# Patient Record
Sex: MALE | Race: WHITE | NOT HISPANIC OR LATINO | Employment: OTHER | URBAN - METROPOLITAN AREA
[De-identification: names, ages, dates, MRNs, and addresses within clinical notes are randomized per-mention and may not be internally consistent; named-entity substitution may affect disease eponyms.]

---

## 2023-04-17 ENCOUNTER — LAB (OUTPATIENT)
Dept: LAB | Facility: LAB | Age: 63
End: 2023-04-17
Payer: COMMERCIAL

## 2023-04-17 ENCOUNTER — OFFICE VISIT (OUTPATIENT)
Dept: PRIMARY CARE | Facility: CLINIC | Age: 63
End: 2023-04-17
Payer: COMMERCIAL

## 2023-04-17 VITALS
SYSTOLIC BLOOD PRESSURE: 147 MMHG | OXYGEN SATURATION: 96 % | HEIGHT: 71 IN | HEART RATE: 88 BPM | WEIGHT: 315 LBS | BODY MASS INDEX: 44.1 KG/M2 | TEMPERATURE: 97.6 F | DIASTOLIC BLOOD PRESSURE: 72 MMHG

## 2023-04-17 DIAGNOSIS — E11.69 HYPERLIPIDEMIA ASSOCIATED WITH TYPE 2 DIABETES MELLITUS (MULTI): ICD-10-CM

## 2023-04-17 DIAGNOSIS — E78.5 HYPERLIPIDEMIA ASSOCIATED WITH TYPE 2 DIABETES MELLITUS (MULTI): ICD-10-CM

## 2023-04-17 DIAGNOSIS — E66.01 OBESITY, CLASS III, BMI 40-49.9 (MORBID OBESITY) (MULTI): ICD-10-CM

## 2023-04-17 DIAGNOSIS — Z23 NEED FOR PNEUMOCOCCAL VACCINATION: ICD-10-CM

## 2023-04-17 DIAGNOSIS — J43.2 CENTRILOBULAR EMPHYSEMA (MULTI): ICD-10-CM

## 2023-04-17 DIAGNOSIS — I10 HYPERTENSION, UNSPECIFIED TYPE: ICD-10-CM

## 2023-04-17 DIAGNOSIS — F11.21 OPIOID DEPENDENCE IN REMISSION (MULTI): ICD-10-CM

## 2023-04-17 DIAGNOSIS — Z00.01 ANNUAL VISIT FOR GENERAL ADULT MEDICAL EXAMINATION WITH ABNORMAL FINDINGS: Primary | ICD-10-CM

## 2023-04-17 DIAGNOSIS — F31.31 BIPOLAR AFFECTIVE DISORDER, CURRENTLY DEPRESSED, MILD (MULTI): ICD-10-CM

## 2023-04-17 DIAGNOSIS — M35.05 SJOGREN'S SYNDROME WITH INFLAMMATORY ARTHRITIS (MULTI): ICD-10-CM

## 2023-04-17 PROBLEM — M35.00 SJOGREN'S DISEASE (MULTI): Status: ACTIVE | Noted: 2023-04-17

## 2023-04-17 PROBLEM — E03.9 ACQUIRED HYPOTHYROIDISM: Status: ACTIVE | Noted: 2020-05-28

## 2023-04-17 PROBLEM — N40.0 BPH (BENIGN PROSTATIC HYPERPLASIA): Status: ACTIVE | Noted: 2023-04-17

## 2023-04-17 PROBLEM — G47.33 OBSTRUCTIVE SLEEP APNEA SYNDROME: Status: ACTIVE | Noted: 2020-05-28

## 2023-04-17 PROBLEM — R91.8 PULMONARY NODULES: Status: ACTIVE | Noted: 2023-04-17

## 2023-04-17 PROBLEM — K21.9 GERD (GASTROESOPHAGEAL REFLUX DISEASE): Status: ACTIVE | Noted: 2023-04-17

## 2023-04-17 PROBLEM — J44.9 COPD (CHRONIC OBSTRUCTIVE PULMONARY DISEASE) (MULTI): Status: ACTIVE | Noted: 2023-04-17

## 2023-04-17 LAB
ALBUMIN (MG/L) IN URINE: <7 MG/L
ALBUMIN/CREATININE (UG/MG) IN URINE: NORMAL UG/MG CRT (ref 0–30)
CHOLESTEROL (MG/DL) IN SER/PLAS: 151 MG/DL (ref 0–199)
CHOLESTEROL IN HDL (MG/DL) IN SER/PLAS: 41.2 MG/DL
CHOLESTEROL/HDL RATIO: 3.7
CREATININE (MG/DL) IN URINE: 124 MG/DL (ref 20–370)
ESTIMATED AVERAGE GLUCOSE FOR HBA1C: 120 MG/DL
HEMOGLOBIN A1C/HEMOGLOBIN TOTAL IN BLOOD: 5.8 %
LDL: 83 MG/DL (ref 0–99)
TRIGLYCERIDE (MG/DL) IN SER/PLAS: 135 MG/DL (ref 0–149)
VLDL: 27 MG/DL (ref 0–40)

## 2023-04-17 PROCEDURE — G0439 PPPS, SUBSEQ VISIT: HCPCS | Performed by: INTERNAL MEDICINE

## 2023-04-17 PROCEDURE — 82570 ASSAY OF URINE CREATININE: CPT

## 2023-04-17 PROCEDURE — G0009 ADMIN PNEUMOCOCCAL VACCINE: HCPCS | Performed by: INTERNAL MEDICINE

## 2023-04-17 PROCEDURE — 36415 COLL VENOUS BLD VENIPUNCTURE: CPT

## 2023-04-17 PROCEDURE — 82043 UR ALBUMIN QUANTITATIVE: CPT

## 2023-04-17 PROCEDURE — 80061 LIPID PANEL: CPT

## 2023-04-17 PROCEDURE — 90677 PCV20 VACCINE IM: CPT | Performed by: INTERNAL MEDICINE

## 2023-04-17 PROCEDURE — 1036F TOBACCO NON-USER: CPT | Performed by: INTERNAL MEDICINE

## 2023-04-17 PROCEDURE — 3077F SYST BP >= 140 MM HG: CPT | Performed by: INTERNAL MEDICINE

## 2023-04-17 PROCEDURE — 3044F HG A1C LEVEL LT 7.0%: CPT | Performed by: INTERNAL MEDICINE

## 2023-04-17 PROCEDURE — 83036 HEMOGLOBIN GLYCOSYLATED A1C: CPT

## 2023-04-17 PROCEDURE — 3078F DIAST BP <80 MM HG: CPT | Performed by: INTERNAL MEDICINE

## 2023-04-17 PROCEDURE — 4010F ACE/ARB THERAPY RXD/TAKEN: CPT | Performed by: INTERNAL MEDICINE

## 2023-04-17 PROCEDURE — 99213 OFFICE O/P EST LOW 20 MIN: CPT | Performed by: INTERNAL MEDICINE

## 2023-04-17 RX ORDER — MYCOPHENOLATE MOFETIL 250 MG/1
250 CAPSULE ORAL 2 TIMES DAILY
COMMUNITY
End: 2023-04-17

## 2023-04-17 RX ORDER — FLUOCINONIDE 0.5 MG/G
CREAM TOPICAL
COMMUNITY
Start: 2022-06-13 | End: 2024-04-01 | Stop reason: ALTCHOICE

## 2023-04-17 RX ORDER — PILOCARPINE HYDROCHLORIDE 5 MG/1
TABLET, FILM COATED ORAL
COMMUNITY
Start: 2018-02-09 | End: 2023-11-21 | Stop reason: SDUPTHER

## 2023-04-17 RX ORDER — CLOTRIMAZOLE AND BETAMETHASONE DIPROPIONATE 10; .5 MG/ML; MG/ML
1 LOTION TOPICAL EVERY 12 HOURS
COMMUNITY
End: 2023-04-17 | Stop reason: ALTCHOICE

## 2023-04-17 RX ORDER — OXYBUTYNIN CHLORIDE 15 MG/1
TABLET, EXTENDED RELEASE ORAL
COMMUNITY
Start: 2015-01-14 | End: 2023-11-10 | Stop reason: ALTCHOICE

## 2023-04-17 RX ORDER — ESOMEPRAZOLE MAGNESIUM 40 MG/1
40 CAPSULE, DELAYED RELEASE ORAL DAILY
COMMUNITY
End: 2023-04-17 | Stop reason: ALTCHOICE

## 2023-04-17 RX ORDER — PREGABALIN 100 MG/1
CAPSULE ORAL
COMMUNITY
Start: 2011-12-28 | End: 2023-11-21 | Stop reason: SDUPTHER

## 2023-04-17 RX ORDER — OLMESARTAN MEDOXOMIL 40 MG/1
40 TABLET ORAL DAILY
Qty: 90 TABLET | Refills: 1 | Status: SHIPPED | OUTPATIENT
Start: 2023-04-17 | End: 2023-08-07

## 2023-04-17 RX ORDER — LAMOTRIGINE 200 MG/1
TABLET ORAL
COMMUNITY
Start: 2023-02-09

## 2023-04-17 RX ORDER — DULOXETINE HYDROCHLORIDE 60 MG/1
60 CAPSULE, DELAYED RELEASE ORAL 2 TIMES DAILY
COMMUNITY
Start: 2022-02-02 | End: 2024-04-01 | Stop reason: ALTCHOICE

## 2023-04-17 RX ORDER — LEVOTHYROXINE SODIUM 150 UG/1
TABLET ORAL
COMMUNITY
Start: 2014-11-24 | End: 2023-11-10 | Stop reason: ALTCHOICE

## 2023-04-17 RX ORDER — FOLIC ACID 1 MG/1
TABLET ORAL
COMMUNITY
Start: 2017-01-13 | End: 2023-11-21 | Stop reason: ENTERED-IN-ERROR

## 2023-04-17 RX ORDER — TAMSULOSIN HYDROCHLORIDE 0.4 MG/1
CAPSULE ORAL
COMMUNITY
Start: 2015-01-14

## 2023-04-17 ASSESSMENT — PATIENT HEALTH QUESTIONNAIRE - PHQ9
1. LITTLE INTEREST OR PLEASURE IN DOING THINGS: NOT AT ALL
2. FEELING DOWN, DEPRESSED OR HOPELESS: NOT AT ALL
SUM OF ALL RESPONSES TO PHQ9 QUESTIONS 1 AND 2: 0

## 2023-04-17 NOTE — PROGRESS NOTES
Patient ID: Ronald Beckham is a 63 y.o. male who presents for Annual Exam.    Assessment/Plan     Problem List Items Addressed This Visit          Respiratory    COPD (chronic obstructive pulmonary disease) (CMS/[facility])     Pt has moderate to severe COPD. It is progressive disease, use of MDI and proper technique discussed, rinse mouth after inhaled corticosteroids. Notify if progressive dyspnea or cough or lethargy, monitor oxygen saturation if possible with home based pulse oximetry. Avoid hospitalization and call us if any flare ups are about to happen, be sure that annual flu vaccine are uptodate and review need for pneumococcal vaccine. Light to moderate low impact exercises are very helpful and deep breathing  exercises are beneficial in chronic lung diseases.             Endocrine/Metabolic    Hyperlipidemia associated with type 2 diabetes mellitus (CMS/[facility]) - Primary     Diabetes is chronic disease, it does not get cured but it can be controlled, in modern medicine there are variety of measures taken to control DM. Usually we want to preserve beta cell functions. Please understand the disease and how our life style can affect control of glycemia. Diabetes leads to macro and microvascular complications and they could be devastating. It is important to have annual eye check and frequent foot inspection and foot inspection. Kidney dysfunction including dialysis, foot amputations, neuropathy, foot ulcers and accelerated atherosclerotic vascular disease are known complications of uncontrolled DM, pt was educated and explained.           Relevant Orders    Hemoglobin A1C    Lipid Panel    Albumin , Urine Random    Obesity, Class III, BMI 40-49.9 (morbid obesity) (CMS/[facility])     I spent <15 minutes face to face with individual providing recommendations for nutrition choices and exercise plan to help achieve weight reduction goals. Obesity is systemic disorder and it can bring devastating morbidities in furture. It is  a matter of calorie gain and loss, keeping bodybank in negative calorie balance mode is the way to sustain weight loss.Diet has a big role in reducing excess body wt. Scheduled and well planned meals and food intake with watchfulness and understanding of calorie portion and distribution is key to understand. Bariatric surgery is another option if sustained wt loss is not achieved and faced with one or more comorbidities with morbid obesity. Weigh yourself twice a week to understand and follow wt loss goals.            Other Visit Diagnoses       Need for pneumococcal vaccination        Relevant Orders    Pneumococcal conjugate vaccine, 20-valent, adult (PREVNAR 20) (Completed)    Hypertension, unspecified type              63-year-old male advised skin cancer prostate cancer colon cancer screening flu pneumonia COVID-19 vaccines review laboratory medication advised follow-up with the dental ophthalmology pain management pulmonary cardiology GI and psych service    Follow-up in 3 months discussed about anxiety depression dementia DNR status advance care planning and cardiovascular risk      I spent 15 minutes obtaining and discussing depression screening using pHq-2 questions with patient documented in the chart treatment plan discussI spent 15 minutes face-to-face    I spent 15 minutes face-to-face. This patient discussing cardiovascular risk and behavior therapy of nutrition choices, exercise, and elevation of the habits contributing to risk. We agree on plan how they may be able to reduce the current cardiovascular risk for patient with risk calculation more than 10% aspirin use was discussed and encouraged unless no one allergy or increased discomfort bleeding or contraindication use of aspirin  Source of history: Nurse, Medical personnel, Medical record, Patient.  History limitation: None.      HPI    Allergies   Allergen Reactions    Cat's Claw Shortness of breath and Wheezing     VERIFIED BY ABRAM SEGURA RN     Gabapentin Rash, Shortness of breath and Unknown    Methotrexate Analogues Shortness of breath and Unknown    Penicillins Unknown, Shortness of breath and Other     Unknown reaction    VERIFIED BY ABRAM SEGURA RN    Aripiprazole Unknown and Swelling     Can't remember reaction    VERIFIED BY ABRAM SEGURA RN    Bee Venom Protein (Honey Bee) Unknown    Codeine Unknown    Docusate Unknown    Ex-Lax Hives and Unknown     Blister    Lithium Analogues Unknown     Uncontrollable body jerking    VERIFIED BY ABRAM SEGURA RN    Meperidine Hives and Unknown     Was told he was allergic while in hospital    Meperidine (Pf) Other    Neomycin Unknown    Neosporin (Neomycin-Polymyx) Unknown    Phenolphthalein Swelling     VERIFIED BY ABRAM SEGURA RN    Phenylephrine-Acetaminophen Swelling     VERIFIED BY ABRAM SEGURA RN    Pseudoephedrine-Acetaminophen Other    Sennosides Other    Latex, Natural Rubber Hives, Rash and Unknown       Medications    Current Outpatient Medications   Medication Sig Dispense Refill    Cymbalta 60 mg DR capsule 1 capsule (60 mg).      fluocinonide 0.05 % cream APPLY SPARINGLY TO AFFECTED AREA 3 TIMES A DAY      folic acid (Folvite) 1 mg tablet Take by mouth.      lamoTRIgine (LaMICtal) 200 mg tablet TAKE 1 TABLET BY MOUTH AT BEDTIME DAILY      levothyroxine (Synthroid, Levoxyl) 150 mcg tablet Take by mouth.      Lyrica 100 mg capsule Take by mouth.      oxybutynin XL (Ditropan-XL) 15 mg 24 hr tablet Take by mouth.      pilocarpine (Salagen) 5 mg tablet Take by mouth.      tamsulosin (Flomax) 0.4 mg 24 hr capsule Take by mouth.      clotrimazole-betamethasone (Lotrisone) lotion 1 Application  in the morning and 1 Application in the evening.      esomeprazole (NexIUM) 40 mg DR capsule Take 1 capsule (40 mg) by mouth once daily.      IRON, CARBONYL ORAL Take by mouth.      morphine PF 25 mg/mL injection Infuse into a venous catheter.      mycophenolate (Cellcept) 250 mg capsule Take 1 capsule (250 mg) by  "mouth in the morning and 1 capsule (250 mg) before bedtime.       No current facility-administered medications for this visit.       Objective   Visit Vitals  /72 (BP Location: Left arm, Patient Position: Sitting, BP Cuff Size: Large adult)   Pulse 88   Temp 36.4 °C (97.6 °F)   Ht 1.803 m (5' 11\")   Wt (!) 159 kg (350 lb)   SpO2 96%   BMI 48.82 kg/m²   Smoking Status Former   BSA 2.82 m²       PHYSICAL EXAM  General: NAD. NCAT. Aox3   HEENT: PERRLA. EOMI. MMM. Nares patent bl.  Cardiovascular: RRR. No MRG. S1/S2 wnl.   Respiratory: CTABL. No acute respiratory distress.   GI: Soft, NT abdomen. BS present x 4.   : No CVAT BL  MSK: ROM x 4. CTLS non-tender.   Extremities: No edema. Cap refill < 2 sec.   Skin: No rashes or bruises.   Neuro: Aox3. Cranial Nerves grossly intact. Motor/sensory wnl.   Psych: Mood wnl.          ROS  Constitutional: Denies fevers, chills, fatigue, weight loss/gain  HEENT: Denies HA, vision changes, hearing loss, sore throat  Cardiac: Denies CP, palpitations, edema  Respiratory: Denies SOB, cough, pleuritic chest pain, PND, orthopnea  GI: Denies N/V/D, abd pain, constipation, black/bloody stools  : Denies urinary changes, frequency, hematuria, urgency, retention, flank pain  MSK: Denies joint pain, joint swelling, back pain, neck pain, extremity pain  Neuro: Denies numbness, weakness, tingling    Immunization History   Administered Date(s) Administered    Influenza, Unspecified 10/05/2012, 11/18/2013, 10/25/2016, 08/14/2017, 09/17/2018, 09/16/2019, 09/03/2020, 09/29/2022    Influenza, seasonal, injectable 10/25/2016    PPD Test 05/28/2012, 05/30/2012    Pfizer Gray Cap SARS-CoV-2 04/06/2022    Pfizer Purple Cap SARS-CoV-2 03/10/2021, 03/31/2021, 10/15/2021, 09/19/2022    Pfizer Sars-cov-2 Bivalent 30 mcg/0.3 mL 09/19/2022    Pneumococcal Conjugate Pcv 20 04/17/2023    Pneumococcal Polysaccharide PPSV23 10/28/2019    SARS-CoV-2, Unspecified 04/06/2022    Tdap 03/15/2018    Zoster, " Recombinant 05/01/2018, 08/15/2018, 08/15/2018    Zoster, Unspecified 08/15/2018       Legacy Encounter on 02/13/2023   Component Date Value Ref Range Status    TSH 02/13/2023 1.40  0.44 - 3.98 mIU/L Final    Free T4 02/13/2023 1.47  0.78 - 1.48 ng/dL Final   Legacy Encounter on 01/16/2023   Component Date Value Ref Range Status    Iron 01/16/2023 76  35 - 150 ug/dL Final    TSH 01/16/2023 4.51 (H)  0.44 - 3.98 mIU/L Final    Glucose 01/16/2023 106 (H)  74 - 99 mg/dL Final    Sodium 01/16/2023 138  136 - 145 mmol/L Final    Potassium 01/16/2023 4.2  3.5 - 5.3 mmol/L Final    Chloride 01/16/2023 102  98 - 107 mmol/L Final    Bicarbonate 01/16/2023 30  21 - 32 mmol/L Final    Anion Gap 01/16/2023 10  10 - 20 mmol/L Final    Urea Nitrogen 01/16/2023 12  6 - 23 mg/dL Final    Creatinine 01/16/2023 0.78  0.50 - 1.30 mg/dL Final    GFR MALE 01/16/2023 >90  >90 mL/min/1.73m2 Final    Calcium 01/16/2023 8.5 (L)  8.6 - 10.6 mg/dL Final    Albumin 01/16/2023 3.8  3.4 - 5.0 g/dL Final    Alkaline Phosphatase 01/16/2023 81  33 - 136 U/L Final    Total Protein 01/16/2023 6.5  6.4 - 8.2 g/dL Final    AST 01/16/2023 12  9 - 39 U/L Final    Total Bilirubin 01/16/2023 0.4  0.0 - 1.2 mg/dL Final    ALT (SGPT) 01/16/2023 11  10 - 52 U/L Final    Hemoglobin A1C 01/16/2023 5.7 (A)  % Final    Estimated Average Glucose 01/16/2023 117  MG/DL Final       Radiology: Reviewed imaging in powerchart.  No results found.    Family History   Problem Relation Name Age of Onset    Diabetes Mother       Social History     Socioeconomic History    Marital status:      Spouse name: None    Number of children: None    Years of education: None    Highest education level: None   Occupational History    None   Tobacco Use    Smoking status: Former     Packs/day: 1.50     Years: 40.00     Pack years: 60.00     Types: Cigarettes    Smokeless tobacco: Never   Vaping Use    Vaping status: None   Substance and Sexual Activity    Alcohol use: Never     Drug use: Never    Sexual activity: None   Other Topics Concern    None   Social History Narrative    None     Social Determinants of Health     Financial Resource Strain: Not on file   Food Insecurity: Not on file   Transportation Needs: Not on file   Physical Activity: Not on file   Stress: Not on file   Social Connections: Not on file   Intimate Partner Violence: Not on file   Housing Stability: Not on file     Past Medical History:   Diagnosis Date    Calculus of gallbladder without cholecystitis without obstruction 04/04/2022    Gallstones    Cellulitis of abdominal wall 09/01/2021    Cellulitis of right abdominal wall    Cellulitis of left external ear 06/13/2022    Cellulitis of left earlobe    Cellulitis of right lower limb 09/09/2020    Cellulitis of right lower extremity    Deficiency of other specified B group vitamins 03/19/2019    B12 deficiency    Dorsalgia, unspecified 09/17/2022    Chronic back pain greater than 3 months duration    Encounter for screening for malignant neoplasm of colon 02/04/2021    Screen for colon cancer    Encounter for screening for malignant neoplasm of rectum 02/04/2021    Screening for rectal cancer    Erythema intertrigo 05/09/2021    Intertrigo    Impaired fasting glucose 09/17/2018    Impaired fasting glucose    Localized edema 08/26/2022    Lower extremity edema    Lumbago with sciatica, right side 11/01/2018    Lumbago with sciatica, right side    Morbid (severe) obesity due to excess calories (CMS/HCC) 11/21/2022    Morbid obesity with BMI of 40.0-44.9, adult    Morbid (severe) obesity due to excess calories (CMS/HCC) 07/11/2022    Morbid obesity with BMI of 45.0-49.9, adult    Other bursitis of knee, right knee 03/01/2021    Bursitis of right knee    Other conditions influencing health status 11/21/2022    Diabetes type 2, uncontrolled    Other obesity 03/03/2022    Moderate obesity    Other obesity 11/04/2021    Moderate obesity    Pain in right elbow 08/26/2022     Right elbow pain    Pain in right foot 08/16/2017    Inflammatory pain of right heel    Pain in right hip 07/12/2018    Right hip pain    Pain in right knee 06/15/2021    Acute pain of right knee    Pain in right knee 04/05/2021    Right knee pain    Personal history of diseases of the blood and blood-forming organs and certain disorders involving the immune mechanism 03/19/2019    History of anemia    Personal history of diseases of the skin and subcutaneous tissue 05/09/2021    History of contact dermatitis    Personal history of diseases of the skin and subcutaneous tissue 06/13/2022    History of eczema    Personal history of other diseases of the digestive system 04/27/2022    History of constipation    Personal history of other diseases of the musculoskeletal system and connective tissue 09/16/2019    History of polymyalgia rheumatica    Personal history of other diseases of the musculoskeletal system and connective tissue 03/19/2019    History of arthritis    Personal history of other diseases of the musculoskeletal system and connective tissue 03/01/2021    History of acute gouty arthritis    Personal history of other diseases of the nervous system and sense organs 10/28/2019    History of neuropathy    Personal history of other diseases of the nervous system and sense organs 05/26/2016    History of neuropathy    Personal history of other diseases of the respiratory system 11/21/2022    History of chronic obstructive lung disease    Personal history of other endocrine, nutritional and metabolic disease 03/19/2019    History of vitamin D deficiency    Personal history of other endocrine, nutritional and metabolic disease 09/03/2020    History of obesity    Personal history of other endocrine, nutritional and metabolic disease 06/18/2018    History of hypoglycemia    Personal history of other infectious and parasitic diseases 11/30/2015    History of viral infection    Personal history of other specified  conditions 08/26/2022    History of edema    Personal history of other specified conditions 08/05/2021    History of abdominal pain    Personal history of urinary (tract) infections 12/23/2014    History of urinary tract infection    Proteinuria, unspecified 05/07/2021    Proteinuria    Rash and other nonspecific skin eruption 09/16/2022    Skin rash    Sunburn of first degree 06/18/2018    Sunburn of first degree    Systemic lupus erythematosus, unspecified (CMS/[facility]) 04/28/2020    History of systemic lupus erythematosus (SLE)    Type 2 diabetes mellitus with other circulatory complications (CMS/[facility]) 09/17/2022    Hypertension associated with diabetes    Unspecified open wound, left lower leg, sequela 09/13/2021    Leg wound, left, sequela    Unspecified osteoarthritis, unspecified site 03/03/2022    Osteoarthritis     Past Surgical History:   Procedure Laterality Date    BACK SURGERY  03/29/2022    Back Surgery    CHOLECYSTECTOMY      ELBOW SURGERY  12/18/2014    Elbow Surgery    SHOULDER SURGERY  12/18/2014    Shoulder Surgery    TONSILLECTOMY       * Cannot find OR log *    Charting was completed using voice recognition technology and may include unintended errors.

## 2023-04-18 ENCOUNTER — TELEPHONE (OUTPATIENT)
Dept: PRIMARY CARE | Facility: CLINIC | Age: 63
End: 2023-04-18
Payer: COMMERCIAL

## 2023-04-18 NOTE — TELEPHONE ENCOUNTER
----- Message from Babak Jeronimo MD sent at 4/18/2023  9:14 AM EDT -----  Hemoglobin A1c 5.8 advised low-carb diet follow-up 3 months

## 2023-05-24 ENCOUNTER — HOSPITAL ENCOUNTER (OUTPATIENT)
Dept: DATA CONVERSION | Facility: HOSPITAL | Age: 63
End: 2023-05-24
Attending: PAIN MEDICINE
Payer: COMMERCIAL

## 2023-05-24 DIAGNOSIS — M96.1 POSTLAMINECTOMY SYNDROME, NOT ELSEWHERE CLASSIFIED: ICD-10-CM

## 2023-07-17 ENCOUNTER — APPOINTMENT (OUTPATIENT)
Dept: PRIMARY CARE | Facility: CLINIC | Age: 63
End: 2023-07-17
Payer: COMMERCIAL

## 2023-08-05 DIAGNOSIS — E03.9 HYPOTHYROIDISM, UNSPECIFIED: ICD-10-CM

## 2023-08-05 DIAGNOSIS — I10 HYPERTENSION, UNSPECIFIED TYPE: ICD-10-CM

## 2023-08-07 RX ORDER — LEVOTHYROXINE SODIUM 175 UG/1
TABLET ORAL
Qty: 90 TABLET | Refills: 3 | Status: SHIPPED | OUTPATIENT
Start: 2023-08-07 | End: 2024-05-06

## 2023-08-07 RX ORDER — OLMESARTAN MEDOXOMIL 40 MG/1
40 TABLET ORAL DAILY
Qty: 90 TABLET | Refills: 3 | Status: SHIPPED | OUTPATIENT
Start: 2023-08-07

## 2023-08-11 ENCOUNTER — OFFICE VISIT (OUTPATIENT)
Dept: PRIMARY CARE | Facility: CLINIC | Age: 63
End: 2023-08-11
Payer: MEDICARE

## 2023-08-11 VITALS
BODY MASS INDEX: 44.1 KG/M2 | HEIGHT: 71 IN | DIASTOLIC BLOOD PRESSURE: 59 MMHG | HEART RATE: 67 BPM | WEIGHT: 315 LBS | SYSTOLIC BLOOD PRESSURE: 144 MMHG | OXYGEN SATURATION: 94 % | TEMPERATURE: 97 F

## 2023-08-11 DIAGNOSIS — J43.2 CENTRILOBULAR EMPHYSEMA (MULTI): Primary | ICD-10-CM

## 2023-08-11 DIAGNOSIS — M35.05 SJOGREN'S SYNDROME WITH INFLAMMATORY ARTHRITIS (MULTI): ICD-10-CM

## 2023-08-11 DIAGNOSIS — E78.5 HYPERLIPIDEMIA ASSOCIATED WITH TYPE 2 DIABETES MELLITUS (MULTI): ICD-10-CM

## 2023-08-11 DIAGNOSIS — E66.01 OBESITY, CLASS III, BMI 40-49.9 (MORBID OBESITY) (MULTI): ICD-10-CM

## 2023-08-11 DIAGNOSIS — E11.69 HYPERLIPIDEMIA ASSOCIATED WITH TYPE 2 DIABETES MELLITUS (MULTI): ICD-10-CM

## 2023-08-11 PROCEDURE — 3044F HG A1C LEVEL LT 7.0%: CPT | Performed by: INTERNAL MEDICINE

## 2023-08-11 PROCEDURE — 1036F TOBACCO NON-USER: CPT | Performed by: INTERNAL MEDICINE

## 2023-08-11 PROCEDURE — 4010F ACE/ARB THERAPY RXD/TAKEN: CPT | Performed by: INTERNAL MEDICINE

## 2023-08-11 PROCEDURE — 3078F DIAST BP <80 MM HG: CPT | Performed by: INTERNAL MEDICINE

## 2023-08-11 PROCEDURE — 99213 OFFICE O/P EST LOW 20 MIN: CPT | Performed by: INTERNAL MEDICINE

## 2023-08-11 PROCEDURE — 3077F SYST BP >= 140 MM HG: CPT | Performed by: INTERNAL MEDICINE

## 2023-08-11 NOTE — PROGRESS NOTES
Subjective   Patient ID: Ronald Beckham is a 63 y.o. male who presents for Follow-up (3 month /Go over last blood work ).    Assessment/Plan     Problem List Items Addressed This Visit       COPD (chronic obstructive pulmonary disease) (CMS/[facility]) - Primary     Pt has moderate to severe COPD. It is progressive disease, use of MDI and proper technique discussed, rinse mouth after inhaled corticosteroids. Notify if progressive dyspnea or cough or lethargy, monitor oxygen saturation if possible with home based pulse oximetry. Avoid hospitalization and call us if any flare ups are about to happen, be sure that annual flu vaccine are uptodate and review need for pneumococcal vaccine. Light to moderate low impact exercises are very helpful and deep breathing  exercises are beneficial in chronic lung diseases.           Relevant Orders    Albumin , Urine Random    CBC and Auto Differential    Comprehensive Metabolic Panel    Hemoglobin A1C    Lipid Panel    TSH with reflex to Free T4 if abnormal    Uric Acid    Hyperlipidemia associated with type 2 diabetes mellitus (CMS/[facility])     Patients BP readings reviewed and addressed, as we age our arteries turn stiffer and less elastic. Restricting salt consumption and staying physically fit with regular exercise regimen is the only way to keep our vasculature less tonic. Studies have shown that keeping ideal body wt, exercise routine about 140 to 150 minutes a week, eating variety of plant based diet and drinking plentiful water are quite helpful. Monitor BP twice or once a week at home and bring log to be reviewed by me. Uncontrolled BP has long term consequences including heart failure, myocardial infarction, accelerated atherosclerosis and kidney dysfunction. Therapy reviewed and explained.           Relevant Orders    Albumin , Urine Random    CBC and Auto Differential    Comprehensive Metabolic Panel    Hemoglobin A1C    Lipid Panel    TSH with reflex to Free T4 if abnormal     Uric Acid    Obesity, Class III, BMI 40-49.9 (morbid obesity) (CMS/[facility])     I spent <15 minutes face to face with individual providing recommendations for nutrition choices and exercise plan to help achieve weight reduction goals. Obesity is systemic disorder and it can bring devastating morbidities in furture. It is a matter of calorie gain and loss, keeping bodybank in negative calorie balance mode is the way to sustain weight loss.Diet has a big role in reducing excess body wt. Scheduled and well planned meals and food intake with watchfulness and understanding of calorie portion and distribution is key to understand. Bariatric surgery is another option if sustained wt loss is not achieved and faced with one or more comorbidities with morbid obesity. Weigh yourself twice a week to understand and follow wt loss goals.           Relevant Orders    Albumin , Urine Random    CBC and Auto Differential    Comprehensive Metabolic Panel    Hemoglobin A1C    Lipid Panel    TSH with reflex to Free T4 if abnormal    Uric Acid    Sjogren's disease (CMS/[facility])     Advised to see rheumatologist         Relevant Orders    Albumin , Urine Random    CBC and Auto Differential    Comprehensive Metabolic Panel    Hemoglobin A1C    Lipid Panel    TSH with reflex to Free T4 if abnormal    Uric Acid     Patient was evaluated today, problem list was reviewed, problems and concerns addressed, Rx list reviewed and updated, lab and tests were noted and reviewed. Life style changes were discussed, always it works better if we eat plant based diet and plenty of fibres and roughage. Consume adequate amount of water and avoid alcohol, light to moderate physical activities and stress reduction are always beneficial for ongoing physical well being. Do not forget to have 6 to 7 hours of sleep regularly and avoid late night pushpa screen exposure.    HPI  63-year-old patient who had a history of gastritis hypertension hyperlipidemia proteinuria  complaining arthralgia myalgia fatigue tired anxiety depression insomnia skin rash evaluated by neurology dermatology psych    Negative for Apetex hemoptysis hematuria rectal bleeding hallucination or suicide.  Past Medical History:   Diagnosis Date    Calculus of gallbladder without cholecystitis without obstruction 04/04/2022    Gallstones    Cellulitis of abdominal wall 09/01/2021    Cellulitis of right abdominal wall    Cellulitis of left external ear 06/13/2022    Cellulitis of left earlobe    Cellulitis of right lower limb 09/09/2020    Cellulitis of right lower extremity    Deficiency of other specified B group vitamins 03/19/2019    B12 deficiency    Dorsalgia, unspecified 09/17/2022    Chronic back pain greater than 3 months duration    Encounter for screening for malignant neoplasm of colon 02/04/2021    Screen for colon cancer    Encounter for screening for malignant neoplasm of rectum 02/04/2021    Screening for rectal cancer    Erythema intertrigo 05/09/2021    Intertrigo    Impaired fasting glucose 09/17/2018    Impaired fasting glucose    Localized edema 08/26/2022    Lower extremity edema    Lumbago with sciatica, right side 11/01/2018    Lumbago with sciatica, right side    Morbid (severe) obesity due to excess calories (CMS/HCC) 11/21/2022    Morbid obesity with BMI of 40.0-44.9, adult    Morbid (severe) obesity due to excess calories (CMS/HCC) 07/11/2022    Morbid obesity with BMI of 45.0-49.9, adult    Other bursitis of knee, right knee 03/01/2021    Bursitis of right knee    Other conditions influencing health status 11/21/2022    Diabetes type 2, uncontrolled    Other obesity 03/03/2022    Moderate obesity    Other obesity 11/04/2021    Moderate obesity    Pain in right elbow 08/26/2022    Right elbow pain    Pain in right foot 08/16/2017    Inflammatory pain of right heel    Pain in right hip 07/12/2018    Right hip pain    Pain in right knee 06/15/2021    Acute pain of right knee    Pain in  right knee 04/05/2021    Right knee pain    Personal history of diseases of the blood and blood-forming organs and certain disorders involving the immune mechanism 03/19/2019    History of anemia    Personal history of diseases of the skin and subcutaneous tissue 05/09/2021    History of contact dermatitis    Personal history of diseases of the skin and subcutaneous tissue 06/13/2022    History of eczema    Personal history of other diseases of the digestive system 04/27/2022    History of constipation    Personal history of other diseases of the musculoskeletal system and connective tissue 09/16/2019    History of polymyalgia rheumatica    Personal history of other diseases of the musculoskeletal system and connective tissue 03/19/2019    History of arthritis    Personal history of other diseases of the musculoskeletal system and connective tissue 03/01/2021    History of acute gouty arthritis    Personal history of other diseases of the nervous system and sense organs 10/28/2019    History of neuropathy    Personal history of other diseases of the nervous system and sense organs 05/26/2016    History of neuropathy    Personal history of other diseases of the respiratory system 11/21/2022    History of chronic obstructive lung disease    Personal history of other endocrine, nutritional and metabolic disease 03/19/2019    History of vitamin D deficiency    Personal history of other endocrine, nutritional and metabolic disease 09/03/2020    History of obesity    Personal history of other endocrine, nutritional and metabolic disease 06/18/2018    History of hypoglycemia    Personal history of other infectious and parasitic diseases 11/30/2015    History of viral infection    Personal history of other specified conditions 08/26/2022    History of edema    Personal history of other specified conditions 08/05/2021    History of abdominal pain    Personal history of urinary (tract) infections 12/23/2014    History of  urinary tract infection    Proteinuria, unspecified 05/07/2021    Proteinuria    Rash and other nonspecific skin eruption 09/16/2022    Skin rash    Sunburn of first degree 06/18/2018    Sunburn of first degree    Systemic lupus erythematosus, unspecified (CMS/[facility]) 04/28/2020    History of systemic lupus erythematosus (SLE)    Type 2 diabetes mellitus with other circulatory complications (CMS/[facility]) 09/17/2022    Hypertension associated with diabetes    Unspecified open wound, left lower leg, sequela 09/13/2021    Leg wound, left, sequela    Unspecified osteoarthritis, unspecified site 03/03/2022    Osteoarthritis     Past Surgical History:   Procedure Laterality Date    BACK SURGERY  03/29/2022    Back Surgery    CHOLECYSTECTOMY      ELBOW SURGERY  12/18/2014    Elbow Surgery    SHOULDER SURGERY  12/18/2014    Shoulder Surgery    TONSILLECTOMY       Allergies   Allergen Reactions    Cat's Claw Shortness of breath and Wheezing     VERIFIED BY ABRAM SEGURA RN    Gabapentin Rash, Shortness of breath and Unknown    Methotrexate Analogues Shortness of breath and Unknown    Penicillins Unknown, Shortness of breath and Other     Unknown reaction    VERIFIED BY ABRAM SEGURA RN    Aripiprazole Unknown and Swelling     Can't remember reaction    VERIFIED BY ABRAM SEGURA RN    Bee Venom Protein (Honey Bee) Unknown    Codeine Unknown    Docusate Unknown    Ex-Lax Hives and Unknown     Blister    Lithium Analogues Unknown     Uncontrollable body jerking    VERIFIED BY ABRAM SEGURA RN    Meperidine Hives and Unknown     Was told he was allergic while in hospital    Meperidine (Pf) Other    Neomycin Unknown    Neosporin (Neomycin-Polymyx) Unknown    Phenolphthalein Swelling     VERIFIED BY ABRAM SEGURA RN    Phenylephrine-Acetaminophen Swelling     VERIFIED BY ABRAM SEGURA RN    Pseudoephedrine-Acetaminophen Other    Sennosides Other    Latex, Natural Rubber Hives, Rash and Unknown     Current Outpatient Medications   Medication  Sig Dispense Refill    Cymbalta 60 mg DR capsule 1 capsule (60 mg).      esomeprazole (NexIUM) 40 mg DR capsule TAKE 1 CAPSULE BY MOUTH EVERY DAY 90 capsule 3    fluocinonide 0.05 % cream APPLY SPARINGLY TO AFFECTED AREA 3 TIMES A DAY      folic acid (Folvite) 1 mg tablet Take by mouth.      lamoTRIgine (LaMICtal) 200 mg tablet TAKE 1 TABLET BY MOUTH AT BEDTIME DAILY      levothyroxine (Synthroid, Levoxyl) 150 mcg tablet Take by mouth.      levothyroxine (Synthroid, Levoxyl) 175 mcg tablet TAKE 1 TABLET BY MOUTH DAILY MONDAY THROUGH SATURDAY AND 1 & 1/2 TABLETS ON SUNDAYS 90 tablet 3    Lyrica 100 mg capsule Take by mouth.      olmesartan (BENIcar) 40 mg tablet TAKE 1 TABLET BY MOUTH EVERY DAY 90 tablet 3    oxybutynin XL (Ditropan-XL) 15 mg 24 hr tablet Take by mouth.      pilocarpine (Salagen) 5 mg tablet Take by mouth.      ramipril (Altace) 10 mg capsule TAKE 1 CAPSULE BY MOUTH EVERY DAY 90 capsule 3    tamsulosin (Flomax) 0.4 mg 24 hr capsule Take by mouth.       No current facility-administered medications for this visit.     Family History   Problem Relation Name Age of Onset    Diabetes Mother       Social History     Socioeconomic History    Marital status:      Spouse name: None    Number of children: None    Years of education: None    Highest education level: None   Occupational History    None   Tobacco Use    Smoking status: Former     Packs/day: 1.50     Years: 40.00     Total pack years: 60.00     Types: Cigarettes    Smokeless tobacco: Never   Substance and Sexual Activity    Alcohol use: Never    Drug use: Never    Sexual activity: None   Other Topics Concern    None   Social History Narrative    None     Social Determinants of Health     Financial Resource Strain: Not on file   Food Insecurity: Not on file   Transportation Needs: Not on file   Physical Activity: Not on file   Stress: Not on file   Social Connections: Not on file   Intimate Partner Violence: Not on file   Housing Stability:  "Not on file     Immunization History   Administered Date(s) Administered    Influenza, Unspecified 10/05/2012, 11/18/2013, 10/25/2016, 08/14/2017, 09/17/2018, 09/16/2019, 09/03/2020, 09/29/2022    Influenza, seasonal, injectable 10/25/2016    PPD Test 05/28/2012, 05/30/2012    Pfizer COVID-19 vaccine, bivalent, age 12 years and older (30 mcg/0.3 mL) 09/19/2022    Pfizer Gray Cap SARS-CoV-2 04/06/2022    Pfizer Purple Cap SARS-CoV-2 03/10/2021, 03/31/2021, 10/15/2021, 09/19/2022    Pneumococcal conjugate vaccine, 20-valent, adult (PREVNAR 20) 04/17/2023    Pneumococcal polysaccharide vaccine, 23-valent, age 2 years and older (PNEUMOVAX 23) 10/28/2019    SARS-CoV-2, Unspecified 04/06/2022    Tdap vaccine, age 10 years and older (BOOSTRIX) 03/15/2018    Zoster vaccine, recombinant, adult (SHINGRIX) 05/01/2018, 08/15/2018, 08/15/2018    Zoster, Unspecified 08/15/2018       Review of Systems  Review of systems is otherwise negative unless stated above or in history of present illness.    Objective   Visit Vitals  /59 (BP Location: Left arm, Patient Position: Sitting, BP Cuff Size: Large adult)   Pulse 67   Temp 36.1 °C (97 °F)   Ht 1.803 m (5' 11\")   Wt (!) 154 kg (340 lb)   SpO2 94%   BMI 47.42 kg/m²   Smoking Status Former   BSA 2.78 m²     Physical Exam  Constitutional:       General: not in acute distress.   HENT:      Head: Normocephalic and atraumatic.      Nose: Nose normal.   Eyes:      Extraocular Movements: Extraocular movements intact.      Conjunctiva/sclera: Conjunctivae normal.   Cardiovascular:      Rate and Rhythm: Normal rate ,  No M/R/G  Pulmonary:      Effort: Pulmonary effort is normal.      Breath sounds: Normal, Bilat Equal AE  Skin:     General: Skin is warm.   Neurological:      Mental Status: He is alert and oriented to person, place, and time.   Psychiatric:         Mood and Affect: Mood normal.         Behavior: Behavior normal.   Musculoskeletal   FROM in all extremitirs,  Joint-no " swelling or tenderness    Lab on 04/17/2023   Component Date Value Ref Range Status    ALBUMIN (MG/L) IN URINE 04/17/2023 <7.0  Not Established mg/L Final    Albumin/Creatine Ratio 04/17/2023 SEE COMMENT  0.0 - 30.0 ug/mg crt Final    Creatinine, Urine 04/17/2023 124.0  20.0 - 370.0 mg/dL Final   Office Visit on 04/17/2023   Component Date Value Ref Range Status    Hemoglobin A1C 04/17/2023 5.8 (A)  % Final    Estimated Average Glucose 04/17/2023 120  MG/DL Final    Cholesterol 04/17/2023 151  0 - 199 mg/dL Final    HDL 04/17/2023 41.2  mg/dL Final    Cholesterol/HDL Ratio 04/17/2023 3.7   Final    LDL 04/17/2023 83  0 - 99 mg/dL Final    VLDL 04/17/2023 27  0 - 40 mg/dL Final    Triglycerides 04/17/2023 135  0 - 149 mg/dL Final       Radiology: Reviewed imaging in powerchart.  No results found.      Charting was completed using voice recognition technology and may include unintended errors.

## 2023-08-15 ENCOUNTER — LAB (OUTPATIENT)
Dept: LAB | Facility: LAB | Age: 63
End: 2023-08-15
Payer: COMMERCIAL

## 2023-08-15 DIAGNOSIS — E66.01 OBESITY, CLASS III, BMI 40-49.9 (MORBID OBESITY) (MULTI): ICD-10-CM

## 2023-08-15 DIAGNOSIS — E11.69 HYPERLIPIDEMIA ASSOCIATED WITH TYPE 2 DIABETES MELLITUS (MULTI): ICD-10-CM

## 2023-08-15 DIAGNOSIS — E78.5 HYPERLIPIDEMIA ASSOCIATED WITH TYPE 2 DIABETES MELLITUS (MULTI): ICD-10-CM

## 2023-08-15 DIAGNOSIS — J43.2 CENTRILOBULAR EMPHYSEMA (MULTI): ICD-10-CM

## 2023-08-15 DIAGNOSIS — M35.05 SJOGREN'S SYNDROME WITH INFLAMMATORY ARTHRITIS (MULTI): ICD-10-CM

## 2023-08-15 PROCEDURE — 84550 ASSAY OF BLOOD/URIC ACID: CPT

## 2023-08-15 PROCEDURE — 85025 COMPLETE CBC W/AUTO DIFF WBC: CPT

## 2023-08-15 PROCEDURE — 83036 HEMOGLOBIN GLYCOSYLATED A1C: CPT

## 2023-08-15 PROCEDURE — 84443 ASSAY THYROID STIM HORMONE: CPT

## 2023-08-15 PROCEDURE — 80061 LIPID PANEL: CPT

## 2023-08-15 PROCEDURE — 80053 COMPREHEN METABOLIC PANEL: CPT

## 2023-08-15 PROCEDURE — 84439 ASSAY OF FREE THYROXINE: CPT

## 2023-08-15 PROCEDURE — 36415 COLL VENOUS BLD VENIPUNCTURE: CPT

## 2023-08-16 LAB
ALANINE AMINOTRANSFERASE (SGPT) (U/L) IN SER/PLAS: 13 U/L (ref 10–52)
ALANINE AMINOTRANSFERASE (SGPT) (U/L) IN SER/PLAS: NORMAL
ALBUMIN (G/DL) IN SER/PLAS: 3.9 G/DL (ref 3.4–5)
ALBUMIN (G/DL) IN SER/PLAS: NORMAL
ALBUMIN ELP: NORMAL
ALBUMIN ELP: NORMAL
ALKALINE PHOSPHATASE (U/L) IN SER/PLAS: 71 U/L (ref 33–136)
ALKALINE PHOSPHATASE (U/L) IN SER/PLAS: NORMAL
ALPHA 1: NORMAL
ALPHA 1: NORMAL
ALPHA 2: NORMAL
ALPHA 2: NORMAL
ANION GAP IN SER/PLAS: 13 MMOL/L (ref 10–20)
ANION GAP IN SER/PLAS: NORMAL
ASPARTATE AMINOTRANSFERASE (SGOT) (U/L) IN SER/PLAS: 17 U/L (ref 9–39)
ASPARTATE AMINOTRANSFERASE (SGOT) (U/L) IN SER/PLAS: NORMAL
BASOPHILS (10*3/UL) IN BLOOD BY AUTOMATED COUNT: 0.05 X10E9/L (ref 0–0.1)
BASOPHILS (10*3/UL) IN BLOOD BY AUTOMATED COUNT: NORMAL
BASOPHILS/100 LEUKOCYTES IN BLOOD BY AUTOMATED COUNT: 1.3 % (ref 0–2)
BASOPHILS/100 LEUKOCYTES IN BLOOD BY AUTOMATED COUNT: NORMAL
BETA: NORMAL
BETA: NORMAL
BILIRUBIN TOTAL (MG/DL) IN SER/PLAS: 0.3 MG/DL (ref 0–1.2)
BILIRUBIN TOTAL (MG/DL) IN SER/PLAS: NORMAL
C REACTIVE PROTEIN (MG/L) IN SER/PLAS: NORMAL
C REACTIVE PROTEIN (MG/L) IN SER/PLAS: NORMAL MG/DL
CALCIUM (MG/DL) IN SER/PLAS: 8.8 MG/DL (ref 8.6–10.6)
CALCIUM (MG/DL) IN SER/PLAS: NORMAL
CARBON DIOXIDE, TOTAL (MMOL/L) IN SER/PLAS: 29 MMOL/L (ref 21–32)
CARBON DIOXIDE, TOTAL (MMOL/L) IN SER/PLAS: NORMAL
CHLORIDE (MMOL/L) IN SER/PLAS: 103 MMOL/L (ref 98–107)
CHLORIDE (MMOL/L) IN SER/PLAS: NORMAL
CHOLESTEROL (MG/DL) IN SER/PLAS: 146 MG/DL (ref 0–199)
CHOLESTEROL IN HDL (MG/DL) IN SER/PLAS: 41.2 MG/DL
CHOLESTEROL/HDL RATIO: 3.5
CITRULLINE ANTIBODY, IGG: NORMAL
CITRULLINE ANTIBODY, IGG: NORMAL
COMPLEMENT C3 (MG/DL) IN SER/PLAS: NORMAL
COMPLEMENT C3 (MG/DL) IN SER/PLAS: NORMAL MG/DL (ref 87–200)
COMPLEMENT C4 (MG/DL) IN SER/PLAS: NORMAL
COMPLEMENT C4 (MG/DL) IN SER/PLAS: NORMAL MG/DL (ref 10–50)
CREATININE (MG/DL) IN SER/PLAS: 0.98 MG/DL (ref 0.5–1.3)
CREATININE (MG/DL) IN SER/PLAS: NORMAL
EOSINOPHILS (10*3/UL) IN BLOOD BY AUTOMATED COUNT: 0.32 X10E9/L (ref 0–0.7)
EOSINOPHILS (10*3/UL) IN BLOOD BY AUTOMATED COUNT: NORMAL
EOSINOPHILS/100 LEUKOCYTES IN BLOOD BY AUTOMATED COUNT: 8.1 % (ref 0–6)
EOSINOPHILS/100 LEUKOCYTES IN BLOOD BY AUTOMATED COUNT: NORMAL
ERYTHROCYTE DISTRIBUTION WIDTH (RATIO) BY AUTOMATED COUNT: 12.7 % (ref 11.5–14.5)
ERYTHROCYTE DISTRIBUTION WIDTH (RATIO) BY AUTOMATED COUNT: NORMAL
ERYTHROCYTE MEAN CORPUSCULAR HEMOGLOBIN CONCENTRATION (G/DL) BY AUTOMATED: 31.3 G/DL (ref 32–36)
ERYTHROCYTE MEAN CORPUSCULAR HEMOGLOBIN CONCENTRATION (G/DL) BY AUTOMATED: NORMAL
ERYTHROCYTE MEAN CORPUSCULAR VOLUME (FL) BY AUTOMATED COUNT: 104 FL (ref 80–100)
ERYTHROCYTE MEAN CORPUSCULAR VOLUME (FL) BY AUTOMATED COUNT: NORMAL
ERYTHROCYTES (10*6/UL) IN BLOOD BY AUTOMATED COUNT: 3.66 X10E12/L (ref 4.5–5.9)
ERYTHROCYTES (10*6/UL) IN BLOOD BY AUTOMATED COUNT: NORMAL
ESTIMATED AVERAGE GLUCOSE FOR HBA1C: 117 MG/DL
GAMMA GLOBULIN: NORMAL
GAMMA GLOBULIN: NORMAL
GFR FEMALE: NORMAL
GFR MALE: 86 ML/MIN/1.73M2
GFR MALE: NORMAL
GLUCOSE (MG/DL) IN SER/PLAS: 97 MG/DL (ref 74–99)
GLUCOSE (MG/DL) IN SER/PLAS: NORMAL
HEMATOCRIT (%) IN BLOOD BY AUTOMATED COUNT: 38 % (ref 41–52)
HEMATOCRIT (%) IN BLOOD BY AUTOMATED COUNT: NORMAL
HEMOGLOBIN (G/DL) IN BLOOD: 11.9 G/DL (ref 13.5–17.5)
HEMOGLOBIN (G/DL) IN BLOOD: NORMAL
HEMOGLOBIN A1C/HEMOGLOBIN TOTAL IN BLOOD: 5.7 %
IMMATURE GRANULOCYTES/100 LEUKOCYTES IN BLOOD BY AUTOMATED COUNT: 0.3 % (ref 0–0.9)
IMMATURE GRANULOCYTES/100 LEUKOCYTES IN BLOOD BY AUTOMATED COUNT: NORMAL
LDL: 77 MG/DL (ref 0–99)
LEUKOCYTES (10*3/UL) IN BLOOD BY AUTOMATED COUNT: 4 X10E9/L (ref 4.4–11.3)
LEUKOCYTES (10*3/UL) IN BLOOD BY AUTOMATED COUNT: NORMAL
LYMPHOCYTES (10*3/UL) IN BLOOD BY AUTOMATED COUNT: 1.44 X10E9/L (ref 1.2–4.8)
LYMPHOCYTES (10*3/UL) IN BLOOD BY AUTOMATED COUNT: NORMAL
LYMPHOCYTES/100 LEUKOCYTES IN BLOOD BY AUTOMATED COUNT: 36.3 % (ref 13–44)
LYMPHOCYTES/100 LEUKOCYTES IN BLOOD BY AUTOMATED COUNT: NORMAL
MANUAL DIFFERENTIAL Y/N: NORMAL
MONOCYTES (10*3/UL) IN BLOOD BY AUTOMATED COUNT: 0.46 X10E9/L (ref 0.1–1)
MONOCYTES (10*3/UL) IN BLOOD BY AUTOMATED COUNT: NORMAL
MONOCYTES/100 LEUKOCYTES IN BLOOD BY AUTOMATED COUNT: 11.6 % (ref 2–10)
MONOCYTES/100 LEUKOCYTES IN BLOOD BY AUTOMATED COUNT: NORMAL
NEUTROPHILS (10*3/UL) IN BLOOD BY AUTOMATED COUNT: 1.69 X10E9/L (ref 1.2–7.7)
NEUTROPHILS (10*3/UL) IN BLOOD BY AUTOMATED COUNT: NORMAL
NEUTROPHILS/100 LEUKOCYTES IN BLOOD BY AUTOMATED COUNT: 42.4 % (ref 40–80)
NEUTROPHILS/100 LEUKOCYTES IN BLOOD BY AUTOMATED COUNT: NORMAL
NRBC (PER 100 WBCS) BY AUTOMATED COUNT: 0 /100 WBC (ref 0–0)
NRBC (PER 100 WBCS) BY AUTOMATED COUNT: NORMAL
PATH REVIEW-SERUM PROTEIN ELECTROPHORESIS: NORMAL
PATH REVIEW-SERUM PROTEIN ELECTROPHORESIS: NORMAL
PLATELETS (10*3/UL) IN BLOOD AUTOMATED COUNT: 173 X10E9/L (ref 150–450)
PLATELETS (10*3/UL) IN BLOOD AUTOMATED COUNT: NORMAL
POTASSIUM (MMOL/L) IN SER/PLAS: 4.7 MMOL/L (ref 3.5–5.3)
POTASSIUM (MMOL/L) IN SER/PLAS: NORMAL
PROTEIN ELECTROPHORESIS INTERPRETATION: NORMAL
PROTEIN ELECTROPHORESIS INTERPRETATION: NORMAL
PROTEIN TOTAL: 6.7 G/DL (ref 6.4–8.2)
PROTEIN TOTAL: NORMAL
PROTEIN TOTAL: NORMAL
PROTEIN TOTAL: NORMAL G/DL (ref 6.4–8.2)
SEDIMENTATION RATE, ERYTHROCYTE: NORMAL
SEDIMENTATION RATE, ERYTHROCYTE: NORMAL MM/H (ref 0–20)
SODIUM (MMOL/L) IN SER/PLAS: 140 MMOL/L (ref 136–145)
SODIUM (MMOL/L) IN SER/PLAS: NORMAL
THYROTROPIN (MIU/L) IN SER/PLAS BY DETECTION LIMIT <= 0.05 MIU/L: 5.21 MIU/L (ref 0.44–3.98)
THYROXINE (T4) FREE (NG/DL) IN SER/PLAS: 1 NG/DL (ref 0.78–1.48)
TRIGLYCERIDE (MG/DL) IN SER/PLAS: 137 MG/DL (ref 0–149)
URATE (MG/DL) IN SER/PLAS: 4.3 MG/DL (ref 4–7.5)
UREA NITROGEN (MG/DL) IN SER/PLAS: 13 MG/DL (ref 6–23)
UREA NITROGEN (MG/DL) IN SER/PLAS: NORMAL
VLDL: 27 MG/DL (ref 0–40)

## 2023-08-18 ENCOUNTER — TELEPHONE (OUTPATIENT)
Dept: PRIMARY CARE | Facility: CLINIC | Age: 63
End: 2023-08-18
Payer: COMMERCIAL

## 2023-08-28 LAB
ANION GAP IN SER/PLAS: 10 MMOL/L (ref 10–20)
CALCIUM (MG/DL) IN SER/PLAS: 9 MG/DL (ref 8.6–10.3)
CARBON DIOXIDE, TOTAL (MMOL/L) IN SER/PLAS: 29 MMOL/L (ref 21–32)
CHLORIDE (MMOL/L) IN SER/PLAS: 104 MMOL/L (ref 98–107)
CREATININE (MG/DL) IN SER/PLAS: 0.77 MG/DL (ref 0.5–1.3)
GFR MALE: >90 ML/MIN/1.73M2
GLUCOSE (MG/DL) IN SER/PLAS: 101 MG/DL (ref 74–99)
POTASSIUM (MMOL/L) IN SER/PLAS: 4.3 MMOL/L (ref 3.5–5.3)
SODIUM (MMOL/L) IN SER/PLAS: 139 MMOL/L (ref 136–145)
UREA NITROGEN (MG/DL) IN SER/PLAS: 16 MG/DL (ref 6–23)

## 2023-09-06 ENCOUNTER — HOSPITAL ENCOUNTER (OUTPATIENT)
Dept: DATA CONVERSION | Facility: HOSPITAL | Age: 63
End: 2023-09-06
Attending: PAIN MEDICINE | Admitting: PAIN MEDICINE
Payer: COMMERCIAL

## 2023-09-06 DIAGNOSIS — M96.1 POSTLAMINECTOMY SYNDROME, NOT ELSEWHERE CLASSIFIED: ICD-10-CM

## 2023-09-29 VITALS — BODY MASS INDEX: 44.1 KG/M2 | WEIGHT: 315 LBS | HEIGHT: 71 IN

## 2023-10-02 NOTE — OP NOTE
PROCEDURE DETAILS      Postoperative Diagnosis:  Postlaminectomy syndrome   Surgeon: Sandra Hernandez  Resident/Fellow/Other Assistant: None of these were associated with this case    Procedure:  1. pump refill m96.1 [id number]    Anesthesia: No anesthesiologist associated with this case  Estimated Blood Loss: 0  Findings: None         Operative Report:           Operation  Intrathecal pump refill.    Surgical Indications  The patient is here today to receive an intrathecal pump refill to assist with the pain.    Operative Report  The patient was taken to the procedure area, was placed into a supine positioning. The pump was interrogated and showed a refill volume of 2 mL.  The pump then was emptied and a new solution of morphine preservative-free at a dose of 5 mg/mL, a total volume of 20 mL was injected into the pump, and the pump was reprogrammed to deliver a dose of 0.648 mg of morphine per day.  The alarm date was set  for 10/9/2023 and the pump was noted to have a life expectancy requiring replacement by December 2023. The patient recovered for sufficient amount of time and then was discharged home in stable condition. Patient was [facility] pump accordingly and to schedule him for surgery to replace the pump before December 2023.                        Attestation:   Note Completion:  Attending Attestation I performed the procedure without a resident         Electronic Signatures:  Sandra Hernandez)  (Signed 24-May-2023 13:27)   Authored: Post-Operative Note, Chart Review, Note Completion      Last Updated: 24-May-2023 13:27 by Sandra Hernandez)

## 2023-10-23 PROBLEM — M17.9 OA (OSTEOARTHRITIS) OF KNEE: Status: ACTIVE | Noted: 2023-10-23

## 2023-10-23 PROBLEM — K63.5 COLON POLYPS: Status: ACTIVE | Noted: 2023-06-05

## 2023-10-23 PROBLEM — R68.89 TOTAL SELF-CARE DEFICIT: Status: ACTIVE | Noted: 2023-10-23

## 2023-10-23 PROBLEM — Z45.1 ENCOUNTER FOR ADJUSTMENT AND MANAGEMENT OF INFUSION PUMP: Status: ACTIVE | Noted: 2022-08-29

## 2023-10-23 PROBLEM — F15.99 CAFFEINE-RELATED DISORDER (MULTI): Status: ACTIVE | Noted: 2023-10-23

## 2023-10-23 PROBLEM — I10 ESSENTIAL HYPERTENSION: Status: ACTIVE | Noted: 2018-04-30

## 2023-10-23 PROBLEM — K57.30 DIVERTICULOSIS OF COLON WITHOUT DIVERTICULITIS: Status: ACTIVE | Noted: 2023-06-05

## 2023-10-23 PROBLEM — K58.9 IBS (IRRITABLE BOWEL SYNDROME): Status: ACTIVE | Noted: 2023-10-23

## 2023-10-23 PROBLEM — F06.31 DEPRESSION DUE TO PHYSICAL ILLNESS: Status: ACTIVE | Noted: 2023-10-23

## 2023-10-23 PROBLEM — F45.41 PSYCHOGENIC PAIN: Status: ACTIVE | Noted: 2023-10-23

## 2023-10-23 PROBLEM — G62.9 NEUROPATHY: Status: ACTIVE | Noted: 2023-10-23

## 2023-10-23 PROBLEM — L30.9 ECZEMA: Status: ACTIVE | Noted: 2023-10-23

## 2023-10-23 PROBLEM — K80.20 CHOLELITHIASIS: Status: ACTIVE | Noted: 2023-05-01

## 2023-10-23 PROBLEM — E11.9 TYPE 2 DIABETES MELLITUS (MULTI): Status: ACTIVE | Noted: 2023-10-23

## 2023-10-23 PROBLEM — R41.3 DISTURBANCE OF MEMORY: Status: ACTIVE | Noted: 2023-10-23

## 2023-10-23 PROBLEM — F31.70 BIPOLAR DISORDER IN PARTIAL REMISSION (MULTI): Status: ACTIVE | Noted: 2023-04-12

## 2023-10-23 PROBLEM — G89.29 CHRONIC HIP PAIN: Status: ACTIVE | Noted: 2023-10-23

## 2023-10-23 PROBLEM — Z90.49 S/P LAPAROSCOPIC CHOLECYSTECTOMY: Status: ACTIVE | Noted: 2023-10-23

## 2023-10-23 PROBLEM — F41.1 GAD (GENERALIZED ANXIETY DISORDER): Status: ACTIVE | Noted: 2023-10-23

## 2023-10-23 PROBLEM — R41.840 ATTENTION DISTURBANCE: Status: ACTIVE | Noted: 2023-10-23

## 2023-10-23 PROBLEM — M25.559 CHRONIC HIP PAIN: Status: ACTIVE | Noted: 2023-10-23

## 2023-10-23 PROBLEM — R60.9 EDEMA: Status: ACTIVE | Noted: 2023-10-23

## 2023-10-23 RX ORDER — LAMOTRIGINE 100 MG/1
200 TABLET ORAL NIGHTLY
COMMUNITY
End: 2023-11-10 | Stop reason: ALTCHOICE

## 2023-10-23 RX ORDER — SODIUM FLUORIDE 5 MG/G
GEL, DENTIFRICE DENTAL
COMMUNITY
Start: 2017-12-09 | End: 2023-11-10 | Stop reason: ALTCHOICE

## 2023-10-23 RX ORDER — DULOXETIN HYDROCHLORIDE 30 MG/1
30 CAPSULE, DELAYED RELEASE ORAL DAILY
COMMUNITY
End: 2023-11-10 | Stop reason: ALTCHOICE

## 2023-10-23 RX ORDER — FERROUS SULFATE 325(65) MG
1 TABLET ORAL DAILY
COMMUNITY
Start: 2023-01-16 | End: 2024-04-01 | Stop reason: ALTCHOICE

## 2023-10-23 RX ORDER — BUPROPION HYDROCHLORIDE 150 MG/1
450 TABLET, EXTENDED RELEASE ORAL
COMMUNITY
Start: 2011-06-22 | End: 2023-11-10 | Stop reason: ALTCHOICE

## 2023-10-23 RX ORDER — KETOROLAC TROMETHAMINE 10 MG/1
10 TABLET, FILM COATED ORAL EVERY 8 HOURS PRN
COMMUNITY
Start: 2021-04-05 | End: 2023-11-10 | Stop reason: ALTCHOICE

## 2023-10-23 RX ORDER — HYDROXYCHLOROQUINE SULFATE 200 MG/1
1 TABLET, FILM COATED ORAL 2 TIMES DAILY
COMMUNITY
End: 2023-11-21 | Stop reason: SDUPTHER

## 2023-10-23 RX ORDER — TACROLIMUS 1 MG/G
OINTMENT TOPICAL
COMMUNITY
Start: 2023-08-16 | End: 2023-11-10 | Stop reason: ALTCHOICE

## 2023-10-23 RX ORDER — CELECOXIB 100 MG/1
100 CAPSULE ORAL DAILY
COMMUNITY
Start: 2021-06-15 | End: 2023-11-10 | Stop reason: ALTCHOICE

## 2023-10-23 RX ORDER — CHLORHEXIDINE GLUCONATE ORAL RINSE 1.2 MG/ML
SOLUTION DENTAL
COMMUNITY
Start: 2023-08-28 | End: 2024-04-01 | Stop reason: WASHOUT

## 2023-10-23 RX ORDER — CARIPRAZINE 1.5 MG/1
CAPSULE, GELATIN COATED ORAL
COMMUNITY
End: 2023-11-10 | Stop reason: ALTCHOICE

## 2023-10-23 RX ORDER — ESCITALOPRAM OXALATE 20 MG/1
10 TABLET ORAL DAILY
COMMUNITY
Start: 2015-01-14 | End: 2023-11-10 | Stop reason: ALTCHOICE

## 2023-10-23 RX ORDER — CARIPRAZINE 3 MG/1
3 CAPSULE, GELATIN COATED ORAL NIGHTLY
COMMUNITY
Start: 2023-01-26 | End: 2023-11-10 | Stop reason: ALTCHOICE

## 2023-10-23 RX ORDER — AZATHIOPRINE 50 MG/1
50 TABLET ORAL 2 TIMES DAILY
COMMUNITY
Start: 2018-01-09 | End: 2023-11-10 | Stop reason: ALTCHOICE

## 2023-10-23 RX ORDER — MYCOPHENOLATE MOFETIL 250 MG/1
1 CAPSULE ORAL 2 TIMES DAILY
COMMUNITY
Start: 2021-11-17 | End: 2023-11-21 | Stop reason: SDUPTHER

## 2023-10-23 RX ORDER — TIZANIDINE 4 MG/1
1 TABLET ORAL 4 TIMES DAILY
COMMUNITY
Start: 2021-12-29 | End: 2023-11-10 | Stop reason: ALTCHOICE

## 2023-10-23 RX ORDER — FAMOTIDINE 40 MG/1
TABLET, FILM COATED ORAL
COMMUNITY
Start: 2016-08-17 | End: 2023-11-10 | Stop reason: ALTCHOICE

## 2023-10-23 RX ORDER — FLUPHENAZINE HYDROCHLORIDE 5 MG/1
TABLET ORAL
COMMUNITY
Start: 2019-06-17 | End: 2023-11-10 | Stop reason: ALTCHOICE

## 2023-10-23 RX ORDER — BUDESONIDE, GLYCOPYRROLATE, AND FORMOTEROL FUMARATE 160; 9; 4.8 UG/1; UG/1; UG/1
2 AEROSOL, METERED RESPIRATORY (INHALATION)
COMMUNITY
Start: 2022-03-03 | End: 2023-11-10 | Stop reason: ALTCHOICE

## 2023-10-23 RX ORDER — TRIAMTERENE/HYDROCHLOROTHIAZID 37.5-25 MG
1 TABLET ORAL EVERY MORNING
COMMUNITY
Start: 2021-08-27 | End: 2023-11-10 | Stop reason: ALTCHOICE

## 2023-10-23 RX ORDER — NALOXONE HYDROCHLORIDE 4 MG/.1ML
SPRAY NASAL
COMMUNITY
Start: 2020-03-18

## 2023-10-23 RX ORDER — DESOXIMETASONE 2.5 MG/G
OINTMENT TOPICAL
COMMUNITY
Start: 2018-04-30 | End: 2023-11-10 | Stop reason: ALTCHOICE

## 2023-10-23 RX ORDER — TRIAMCINOLONE ACETONIDE 0.25 MG/G
CREAM TOPICAL
COMMUNITY
Start: 2018-02-09 | End: 2023-11-10 | Stop reason: ALTCHOICE

## 2023-10-25 ENCOUNTER — APPOINTMENT (OUTPATIENT)
Dept: PAIN MEDICINE | Facility: CLINIC | Age: 63
End: 2023-10-25
Payer: COMMERCIAL

## 2023-11-10 ENCOUNTER — OFFICE VISIT (OUTPATIENT)
Dept: PRIMARY CARE | Facility: CLINIC | Age: 63
End: 2023-11-10
Payer: COMMERCIAL

## 2023-11-10 VITALS
BODY MASS INDEX: 44.1 KG/M2 | HEIGHT: 71 IN | SYSTOLIC BLOOD PRESSURE: 132 MMHG | TEMPERATURE: 96.4 F | WEIGHT: 315 LBS | HEART RATE: 78 BPM | OXYGEN SATURATION: 96 % | DIASTOLIC BLOOD PRESSURE: 68 MMHG

## 2023-11-10 DIAGNOSIS — E78.5 HYPERLIPIDEMIA ASSOCIATED WITH TYPE 2 DIABETES MELLITUS (MULTI): ICD-10-CM

## 2023-11-10 DIAGNOSIS — F11.21 OPIOID DEPENDENCE IN REMISSION (MULTI): ICD-10-CM

## 2023-11-10 DIAGNOSIS — M35.05 SJOGREN'S SYNDROME WITH INFLAMMATORY ARTHRITIS (MULTI): ICD-10-CM

## 2023-11-10 DIAGNOSIS — J43.2 CENTRILOBULAR EMPHYSEMA (MULTI): Primary | ICD-10-CM

## 2023-11-10 DIAGNOSIS — E11.00 TYPE 2 DIABETES MELLITUS WITH HYPEROSMOLARITY WITHOUT COMA, WITHOUT LONG-TERM CURRENT USE OF INSULIN (MULTI): ICD-10-CM

## 2023-11-10 DIAGNOSIS — N40.0 BENIGN PROSTATIC HYPERPLASIA WITHOUT LOWER URINARY TRACT SYMPTOMS: ICD-10-CM

## 2023-11-10 DIAGNOSIS — E66.01 OBESITY, CLASS III, BMI 40-49.9 (MORBID OBESITY) (MULTI): ICD-10-CM

## 2023-11-10 DIAGNOSIS — E03.9 ACQUIRED HYPOTHYROIDISM: ICD-10-CM

## 2023-11-10 DIAGNOSIS — F31.31 BIPOLAR AFFECTIVE DISORDER, CURRENTLY DEPRESSED, MILD (MULTI): ICD-10-CM

## 2023-11-10 DIAGNOSIS — M96.1 POSTLAMINECTOMY SYNDROME: ICD-10-CM

## 2023-11-10 DIAGNOSIS — E11.69 HYPERLIPIDEMIA ASSOCIATED WITH TYPE 2 DIABETES MELLITUS (MULTI): ICD-10-CM

## 2023-11-10 DIAGNOSIS — K58.9 IRRITABLE BOWEL SYNDROME, UNSPECIFIED TYPE: ICD-10-CM

## 2023-11-10 PROBLEM — G89.29 CHRONIC HIP PAIN: Status: RESOLVED | Noted: 2023-10-23 | Resolved: 2023-11-10

## 2023-11-10 PROBLEM — L30.9 ECZEMA: Status: RESOLVED | Noted: 2023-10-23 | Resolved: 2023-11-10

## 2023-11-10 PROBLEM — G62.9 NEUROPATHY: Status: RESOLVED | Noted: 2023-10-23 | Resolved: 2023-11-10

## 2023-11-10 PROBLEM — K21.9 GERD (GASTROESOPHAGEAL REFLUX DISEASE): Status: RESOLVED | Noted: 2023-04-17 | Resolved: 2023-11-10

## 2023-11-10 PROBLEM — K80.20 CHOLELITHIASIS: Status: RESOLVED | Noted: 2023-05-01 | Resolved: 2023-11-10

## 2023-11-10 PROBLEM — Z45.1 ENCOUNTER FOR ADJUSTMENT AND MANAGEMENT OF INFUSION PUMP: Status: RESOLVED | Noted: 2022-08-29 | Resolved: 2023-11-10

## 2023-11-10 PROBLEM — R41.3 DISTURBANCE OF MEMORY: Status: RESOLVED | Noted: 2023-10-23 | Resolved: 2023-11-10

## 2023-11-10 PROBLEM — R41.840 ATTENTION DISTURBANCE: Status: RESOLVED | Noted: 2023-10-23 | Resolved: 2023-11-10

## 2023-11-10 PROBLEM — R68.89 TOTAL SELF-CARE DEFICIT: Status: RESOLVED | Noted: 2023-10-23 | Resolved: 2023-11-10

## 2023-11-10 PROBLEM — F31.70 BIPOLAR DISORDER IN PARTIAL REMISSION (MULTI): Status: RESOLVED | Noted: 2023-04-12 | Resolved: 2023-11-10

## 2023-11-10 PROBLEM — Z00.01 ANNUAL VISIT FOR GENERAL ADULT MEDICAL EXAMINATION WITH ABNORMAL FINDINGS: Status: RESOLVED | Noted: 2023-04-17 | Resolved: 2023-11-10

## 2023-11-10 PROBLEM — F41.1 GAD (GENERALIZED ANXIETY DISORDER): Status: RESOLVED | Noted: 2023-10-23 | Resolved: 2023-11-10

## 2023-11-10 PROBLEM — F45.41 PSYCHOGENIC PAIN: Status: RESOLVED | Noted: 2023-10-23 | Resolved: 2023-11-10

## 2023-11-10 PROBLEM — K63.5 COLON POLYPS: Status: RESOLVED | Noted: 2023-06-05 | Resolved: 2023-11-10

## 2023-11-10 PROBLEM — G47.33 OBSTRUCTIVE SLEEP APNEA SYNDROME: Status: RESOLVED | Noted: 2020-05-28 | Resolved: 2023-11-10

## 2023-11-10 PROBLEM — I10 ESSENTIAL HYPERTENSION: Status: RESOLVED | Noted: 2018-04-30 | Resolved: 2023-11-10

## 2023-11-10 PROBLEM — R91.8 MULTIPLE PULMONARY NODULES: Status: RESOLVED | Noted: 2023-04-17 | Resolved: 2023-11-10

## 2023-11-10 PROBLEM — K57.30 DIVERTICULOSIS OF COLON WITHOUT DIVERTICULITIS: Status: RESOLVED | Noted: 2023-06-05 | Resolved: 2023-11-10

## 2023-11-10 PROBLEM — Z90.49 S/P LAPAROSCOPIC CHOLECYSTECTOMY: Status: RESOLVED | Noted: 2023-10-23 | Resolved: 2023-11-10

## 2023-11-10 PROBLEM — M25.559 CHRONIC HIP PAIN: Status: RESOLVED | Noted: 2023-10-23 | Resolved: 2023-11-10

## 2023-11-10 PROBLEM — M17.9 OA (OSTEOARTHRITIS) OF KNEE: Status: RESOLVED | Noted: 2023-10-23 | Resolved: 2023-11-10

## 2023-11-10 PROBLEM — R60.9 EDEMA: Status: RESOLVED | Noted: 2023-10-23 | Resolved: 2023-11-10

## 2023-11-10 PROBLEM — Z23 NEED FOR PNEUMOCOCCAL VACCINATION: Status: RESOLVED | Noted: 2023-04-17 | Resolved: 2023-11-10

## 2023-11-10 PROBLEM — F06.31 DEPRESSION DUE TO PHYSICAL ILLNESS: Status: RESOLVED | Noted: 2023-10-23 | Resolved: 2023-11-10

## 2023-11-10 PROBLEM — F15.99 CAFFEINE-RELATED DISORDER (MULTI): Status: RESOLVED | Noted: 2023-10-23 | Resolved: 2023-11-10

## 2023-11-10 PROCEDURE — 3008F BODY MASS INDEX DOCD: CPT | Performed by: INTERNAL MEDICINE

## 2023-11-10 PROCEDURE — 3044F HG A1C LEVEL LT 7.0%: CPT | Performed by: INTERNAL MEDICINE

## 2023-11-10 PROCEDURE — 4010F ACE/ARB THERAPY RXD/TAKEN: CPT | Performed by: INTERNAL MEDICINE

## 2023-11-10 PROCEDURE — 3075F SYST BP GE 130 - 139MM HG: CPT | Performed by: INTERNAL MEDICINE

## 2023-11-10 PROCEDURE — 1036F TOBACCO NON-USER: CPT | Performed by: INTERNAL MEDICINE

## 2023-11-10 PROCEDURE — 99214 OFFICE O/P EST MOD 30 MIN: CPT | Performed by: INTERNAL MEDICINE

## 2023-11-10 PROCEDURE — 3078F DIAST BP <80 MM HG: CPT | Performed by: INTERNAL MEDICINE

## 2023-11-10 RX ORDER — DICYCLOMINE HYDROCHLORIDE 10 MG/1
10 CAPSULE ORAL
Qty: 20 CAPSULE | Refills: 0 | Status: SHIPPED | OUTPATIENT
Start: 2023-11-10 | End: 2024-04-01 | Stop reason: ALTCHOICE

## 2023-11-10 NOTE — PROGRESS NOTES
Subjective   Patient ID: Ronald Beckham is a 63 y.o. male who presents for Follow-up.    Assessment/Plan     Problem List Items Addressed This Visit       Acquired hypothyroidism    Bipolar affective disorder, currently depressed, mild (CMS/[facility])    BPH (benign prostatic hyperplasia)    COPD (chronic obstructive pulmonary disease) (CMS/[facility]) - Primary     Pt has moderate to severe COPD. It is progressive disease, use of MDI and proper technique discussed, rinse mouth after inhaled corticosteroids. Notify if progressive dyspnea or cough or lethargy, monitor oxygen saturation if possible with home based pulse oximetry. Avoid hospitalization and call us if any flare ups are about to happen, be sure that annual flu vaccine are uptodate and review need for pneumococcal vaccine. Light to moderate low impact exercises are very helpful and deep breathing  exercises are beneficial in chronic lung diseases.          Hyperlipidemia associated with type 2 diabetes mellitus (CMS/[facility])     Diabetes is chronic disease, it does not get cured but it can be controlled, in modern medicine there are variety of measures taken to control DM. Usually we want to preserve beta cell functions. Please understand the disease and how our life style can affect control of glycemia. Diabetes leads to macro and microvascular complications and they could be devastating. It is important to have annual eye check and frequent foot inspection and foot inspection. Kidney dysfunction including dialysis, foot amputations, neuropathy, foot ulcers and accelerated atherosclerotic vascular disease are known complications of uncontrolled DM, pt was educated and explained.           Obesity, Class III, BMI 40-49.9 (morbid obesity) (CMS/[facility])     I spent <15 minutes face to face with individual providing recommendations for nutrition choices and exercise plan to help achieve weight reduction goals. Obesity is systemic disorder and it can bring devastating  morbidities in furture. It is a matter of calorie gain and loss, keeping bodybank in negative calorie balance mode is the way to sustain weight loss.Diet has a big role in reducing excess body wt. Scheduled and well planned meals and food intake with watchfulness and understanding of calorie portion and distribution is key to understand. Bariatric surgery is another option if sustained wt loss is not achieved and faced with one or more comorbidities with morbid obesity. Weigh yourself twice a week to understand and follow wt loss goals.           Postlaminectomy syndrome    Sjogren's disease (CMS/HCC)    RESOLVED: Opioid dependence in remission (CMS/HCC)    RESOLVED: IBS (irritable bowel syndrome)    Relevant Medications    dicyclomine (Bentyl) 10 mg capsule    Type 2 diabetes mellitus (CMS/HCC)     Diabetes is chronic disease, it does not get cured but it can be controlled, in modern medicine there are variety of measures taken to control DM. Usually we want to preserve beta cell functions. Please understand the disease and how our life style can affect control of glycemia. Diabetes leads to macro and microvascular complications and they could be devastating. It is important to have annual eye check and frequent foot inspection and foot inspection. Kidney dysfunction including dialysis, foot amputations, neuropathy, foot ulcers and accelerated atherosclerotic vascular disease are known complications of uncontrolled DM, pt was educated and explained.            Patient was evaluated today, problem list was reviewed, problems and concerns addressed, Rx list reviewed and updated, lab and tests were noted and reviewed. Life style changes were discussed, always it works better if we eat plant based diet and plenty of fibres and roughage. Consume adequate amount of water and avoid alcohol, light to moderate physical activities and stress reduction are always beneficial for ongoing physical well being. Do not forget to  have 6 to 7 hours of sleep regularly and avoid late night pushpa screen exposure.    HPI 63-year-old patient who had a history of obesity bipolar sleep apnea COPD hypertension hyperlipidemia complaining of the most standing knee and hip pain onset acutely duration 2 weeks progressed acutely also complaining of runny nose cough congestion sneezing    Chronic pain advised follow-up with the pain management    Sjogren's disease [place] folate    Autoimmune disease B12 folic acid Plaquenil    Depression bipolar Cymbalta lamotrigine    Hypothyroid levothyroxine    Gastritis Nexium    Proteinuria Benicar    BPH Flomax    Refer patient to GI ophthalmologist and dentist  Past Medical History:   Diagnosis Date    Calculus of gallbladder without cholecystitis without obstruction 04/04/2022    Gallstones    Cellulitis of abdominal wall 09/01/2021    Cellulitis of right abdominal wall    Cellulitis of left external ear 06/13/2022    Cellulitis of left earlobe    Cellulitis of right lower limb 09/09/2020    Cellulitis of right lower extremity    Deficiency of other specified B group vitamins 03/19/2019    B12 deficiency    Dorsalgia, unspecified 09/17/2022    Chronic back pain greater than 3 months duration    Encounter for screening for malignant neoplasm of colon 02/04/2021    Screen for colon cancer    Encounter for screening for malignant neoplasm of rectum 02/04/2021    Screening for rectal cancer    Erythema intertrigo 05/09/2021    Intertrigo    Impaired fasting glucose 09/17/2018    Impaired fasting glucose    Localized edema 08/26/2022    Lower extremity edema    Lumbago with sciatica, right side 11/01/2018    Lumbago with sciatica, right side    Morbid (severe) obesity due to excess calories (CMS/HCC) 11/21/2022    Morbid obesity with BMI of 40.0-44.9, adult    Morbid (severe) obesity due to excess calories (CMS/HCC) 07/11/2022    Morbid obesity with BMI of 45.0-49.9, adult    Other bursitis of knee, right  knee 03/01/2021    Bursitis of right knee    Other conditions influencing health status 11/21/2022    Diabetes type 2, uncontrolled    Other obesity 03/03/2022    Moderate obesity    Other obesity 11/04/2021    Moderate obesity    Pain in right elbow 08/26/2022    Right elbow pain    Pain in right foot 08/16/2017    Inflammatory pain of right heel    Pain in right hip 07/12/2018    Right hip pain    Pain in right knee 06/15/2021    Acute pain of right knee    Pain in right knee 04/05/2021    Right knee pain    Personal history of diseases of the blood and blood-forming organs and certain disorders involving the immune mechanism 03/19/2019    History of anemia    Personal history of diseases of the skin and subcutaneous tissue 05/09/2021    History of contact dermatitis    Personal history of diseases of the skin and subcutaneous tissue 06/13/2022    History of eczema    Personal history of other diseases of the digestive system 04/27/2022    History of constipation    Personal history of other diseases of the musculoskeletal system and connective tissue 09/16/2019    History of polymyalgia rheumatica    Personal history of other diseases of the musculoskeletal system and connective tissue 03/19/2019    History of arthritis    Personal history of other diseases of the musculoskeletal system and connective tissue 03/01/2021    History of acute gouty arthritis    Personal history of other diseases of the nervous system and sense organs 10/28/2019    History of neuropathy    Personal history of other diseases of the nervous system and sense organs 05/26/2016    History of neuropathy    Personal history of other diseases of the respiratory system 11/21/2022    History of chronic obstructive lung disease    Personal history of other endocrine, nutritional and metabolic disease 03/19/2019    History of vitamin D deficiency    Personal history of other endocrine, nutritional and metabolic disease 09/03/2020    History of  obesity    Personal history of other endocrine, nutritional and metabolic disease 06/18/2018    History of hypoglycemia    Personal history of other infectious and parasitic diseases 11/30/2015    History of viral infection    Personal history of other specified conditions 08/26/2022    History of edema    Personal history of other specified conditions 08/05/2021    History of abdominal pain    Personal history of urinary (tract) infections 12/23/2014    History of urinary tract infection    Proteinuria, unspecified 05/07/2021    Proteinuria    Rash and other nonspecific skin eruption 09/16/2022    Skin rash    Sunburn of first degree 06/18/2018    Sunburn of first degree    Systemic lupus erythematosus, unspecified (CMS/[facility]) 04/28/2020    History of systemic lupus erythematosus (SLE)    Type 2 diabetes mellitus with other circulatory complications (CMS/[facility]) 09/17/2022    Hypertension associated with diabetes    Unspecified open wound, left lower leg, sequela 09/13/2021    Leg wound, left, sequela    Unspecified osteoarthritis, unspecified site 03/03/2022    Osteoarthritis     Past Surgical History:   Procedure Laterality Date    BACK SURGERY  03/29/2022    Back Surgery    CHOLECYSTECTOMY      ELBOW SURGERY  12/18/2014    Elbow Surgery    SHOULDER SURGERY  12/18/2014    Shoulder Surgery    TONSILLECTOMY       Allergies   Allergen Reactions    Cat's Claw Shortness of breath and Wheezing     VERIFIED BY ABRAM SEGURA RN    Gabapentin Shortness of breath and Rash    Methotrexate Analogues Shortness of breath    Penicillins Shortness of breath and Other     Unknown reaction    VERIFIED BY ABRAM SEGURA RN    Aripiprazole Swelling and Other     Can't remember reaction    VERIFIED BY ABRAM SEGURA RN    Bacitracin Swelling    Bee Venom Protein (Honey Bee) Unknown    Benzoin Other     Skin peels off -VERIFIED BY ABRAM SEGURA RN    Benzoin Compound Other     Skin peels off     Codeine Unknown    Vlo-Pivkqbjjv-El-Acetaminophen  "Unknown    Docusate Unknown    Ex-Lax Hives     Blister    Lysolecithin Unknown    Meperidine Hives     Was told he was allergic while in hospital    Meperidine (Pf) Other    Neomycin Unknown    Neomycin-Bacitracin-Polymyxin Other     \"makes it worse instead of making it better\"    Neosporin (Neomycin-Polymyx) Unknown    Other Hives     SUN     Phenolphthalein Swelling and Hives     VERIFIED BY ABRAM SEGURA RN    Phenylephrine-Acetaminophen Swelling     VERIFIED BY ABRAM SEGURA RN    Polymyxin B Sulf-Trimethoprim Other    Povidone-Iodine Other     VERIFIED BY BARAM SEGURA RN    Pseudoephedrine-Acetaminophen Other    Senna Other    Sennosides Other    Tizanidine Unknown    Wasp Venom Unknown    Latex, Natural Rubber Hives and Rash    Lithium Analogues Rash and Other     Uncontrollable body jerking    VERIFIED BY ABRAM SEGURA RN     Current Outpatient Medications   Medication Sig Dispense Refill    Cymbalta 60 mg DR capsule 1 capsule (60 mg) 2 times a day.      ergocalciferol, vitamin D2, (VITAMIN D2 ORAL) Take 1 tablet by mouth once daily.      esomeprazole (NexIUM) 40 mg DR capsule TAKE 1 CAPSULE BY MOUTH EVERY DAY 90 capsule 3    ferrous sulfate 325 (65 Fe) MG tablet Take 1 tablet (325 mg) by mouth once daily.      folic acid (Folvite) 1 mg tablet Take by mouth.      hydroxychloroquine (Plaquenil) 200 mg tablet Take 1 tablet (200 mg) by mouth 2 times a day.      lamoTRIgine (LaMICtal) 200 mg tablet TAKE 1 TABLET BY MOUTH AT BEDTIME DAILY      levothyroxine (Synthroid, Levoxyl) 175 mcg tablet TAKE 1 TABLET BY MOUTH DAILY MONDAY THROUGH SATURDAY AND 1 & 1/2 TABLETS ON SUNDAYS 90 tablet 3    Lyrica 100 mg capsule Take by mouth.      mycophenolate (Cellcept) 250 mg capsule Take 1 capsule (250 mg) by mouth 2 times a day.      naloxone (Narcan) 4 mg/0.1 mL nasal spray 1 puff when necessary for signs of overdose      NON FORMULARY Morphine Sulfate      olmesartan (BENIcar) 40 mg tablet TAKE 1 TABLET BY MOUTH EVERY DAY 90 " tablet 3    pilocarpine (Salagen) 5 mg tablet Take by mouth.      tamsulosin (Flomax) 0.4 mg 24 hr capsule Take by mouth.      chlorhexidine (Peridex) 0.12 % solution PLEASE SEE ATTACHED FOR DETAILED DIRECTIONS      dicyclomine (Bentyl) 10 mg capsule Take 1 capsule (10 mg) by mouth 2 times a day. 20 capsule 0    fluocinonide 0.05 % cream APPLY SPARINGLY TO AFFECTED AREA 3 TIMES A DAY       No current facility-administered medications for this visit.     Family History   Problem Relation Name Age of Onset    Diabetes Mother       Social History     Socioeconomic History    Marital status:      Spouse name: None    Number of children: None    Years of education: None    Highest education level: None   Occupational History    None   Tobacco Use    Smoking status: Former     Packs/day: 1.50     Years: 40.00     Additional pack years: 0.00     Total pack years: 60.00     Types: Cigarettes    Smokeless tobacco: Never   Substance and Sexual Activity    Alcohol use: Never    Drug use: Never    Sexual activity: None   Other Topics Concern    None   Social History Narrative    None     Social Determinants of Health     Financial Resource Strain: Not on file   Food Insecurity: Not on file   Transportation Needs: Not on file   Physical Activity: Not on file   Stress: Not on file   Social Connections: Not on file   Intimate Partner Violence: Not on file   Housing Stability: Not on file     Immunization History   Administered Date(s) Administered    Flu vaccine (IIV4), preservative free *Check age/dose* 10/07/2023    Influenza, Unspecified 10/05/2012, 11/18/2013, 10/25/2016, 08/14/2017, 09/17/2018, 09/16/2019, 09/03/2020, 09/29/2022    PPD Test 05/28/2012, 05/30/2012    Pfizer COVID-19 vaccine, Fall 2023, 12 years and older, (30mcg/0.3mL) 10/07/2023    Pfizer Gray Cap SARS-CoV-2 04/06/2022    Pfizer Purple Cap SARS-CoV-2 03/10/2021, 03/31/2021, 10/15/2021, 09/19/2022    Pneumococcal conjugate vaccine, 20-valent (PREVNAR  "20) 04/17/2023    Pneumococcal polysaccharide vaccine, 23-valent, age 2 years and older (PNEUMOVAX 23) 10/28/2019    Tdap vaccine, age 7 year and older (BOOSTRIX) 03/15/2018    Zoster vaccine, recombinant, adult (SHINGRIX) 05/01/2018, 08/15/2018, 05/28/2020       Review of Systems  Review of systems is otherwise negative unless stated above or in history of present illness.    Objective   Visit Vitals  /68 (BP Location: Left arm, Patient Position: Sitting, BP Cuff Size: Large adult)   Pulse 78   Temp 35.8 °C (96.4 °F)   Ht 1.803 m (5' 11\")   Wt (!) 161 kg (356 lb)   SpO2 96%   BMI 49.65 kg/m²   Smoking Status Former   BSA 2.84 m²     Physical Exam  Constitutional: BMI 49     General: not in acute distress.   HENT:      Head: Normocephalic and atraumatic.      Nose: Nose normal.   Eyes:      Extraocular Movements: Extraocular movements intact.      Conjunctiva/sclera: Conjunctivae normal.   Cardiovascular: S1-S2 or S3     Rate and Rhythm: Normal rate ,  No M/R/G  Pulmonary: Expiratory rhonchi     Effort: Pulmonary effort is normal.      Breath sounds: Normal, Bilat Equal AE  Skin: Eczematous dermatitis     General: Skin is warm.   Neurological: Lumbar radiculopathy     Mental Status: He is alert and oriented to person, place, and time.   Psychiatric:         Mood and Affect: Mood normal.         Behavior: Behavior normal.   Musculoskeletal osteoarthritis gouty arthritis  FROM in all extremitirs,  Joint-no swelling or tenderness  Hyperglycemia low-carb diet anemia refer to Dr. Howell for EGD colonoscopy hemoglobin A1c 5.7 TSH 5.21 low-fat low-carb diet follow-up  Orders Only on 08/28/2023   Component Date Value Ref Range Status    Glucose 08/28/2023 101 (H)  74 - 99 mg/dL Final    Sodium 08/28/2023 139  136 - 145 mmol/L Final    Potassium 08/28/2023 4.3  3.5 - 5.3 mmol/L Final    Chloride 08/28/2023 104  98 - 107 mmol/L Final    Bicarbonate 08/28/2023 29  21 - 32 mmol/L Final    Anion Gap 08/28/2023 10  10 - 20 " mmol/L Final    Urea Nitrogen 08/28/2023 16  6 - 23 mg/dL Final    Creatinine 08/28/2023 0.77  0.50 - 1.30 mg/dL Final    GFR MALE 08/28/2023 >90  >90 mL/min/1.73m2 Final    Calcium 08/28/2023 9.0  8.6 - 10.3 mg/dL Final   [facility] Encounter on 08/28/2023   Component Date Value Ref Range Status    Staph/MRSA Screen Culture 08/28/2023 Staphylococcus aureus (A)   Final    Ventricular Rate 08/28/2023 61  BPM Final    Atrial Rate 08/28/2023 61  BPM Final    VA Interval 08/28/2023 190  ms Final    QRS Duration 08/28/2023 96  ms Final    QT Interval 08/28/2023 424  ms Final    QTC Calculation(Bazett) 08/28/2023 426  ms Final    P Axis 08/28/2023 47  degrees Final    R Axis 08/28/2023 51  degrees Final    T Axis 08/28/2023 48  degrees Final    QRS Count 08/28/2023 10  beats Final    Q Onset 08/28/2023 218  ms Final    P Onset 08/28/2023 123  ms Final    P Offset 08/28/2023 191  ms Final    T Offset 08/28/2023 430  ms Final    QTC Fredericia 08/28/2023 426  ms Final   Lab on 08/15/2023   Component Date Value Ref Range Status    WBC 08/15/2023 4.0 (L)  4.4 - 11.3 x10E9/L Final    nRBC 08/15/2023 0.0  0.0 - 0.0 /100 WBC Final    RBC 08/15/2023 3.66 (L)  4.50 - 5.90 x10E12/L Final    Hemoglobin 08/15/2023 11.9 (L)  13.5 - 17.5 g/dL Final    Hematocrit 08/15/2023 38.0 (L)  41.0 - 52.0 % Final    MCV 08/15/2023 104 (H)  80 - 100 fL Final    MCHC 08/15/2023 31.3 (L)  32.0 - 36.0 g/dL Final    Platelets 08/15/2023 173  150 - 450 x10E9/L Final    RDW 08/15/2023 12.7  11.5 - 14.5 % Final    Neutrophils % 08/15/2023 42.4  40.0 - 80.0 % Final    Immature Granulocytes %, Automated 08/15/2023 0.3  0.0 - 0.9 % Final    Lymphocytes % 08/15/2023 36.3  13.0 - 44.0 % Final    Monocytes % 08/15/2023 11.6  2.0 - 10.0 % Final    Eosinophils % 08/15/2023 8.1  0.0 - 6.0 % Final    Basophils % 08/15/2023 1.3  0.0 - 2.0 % Final    Neutrophils Absolute 08/15/2023 1.69  1.20 - 7.70 x10E9/L Final    Lymphocytes Absolute 08/15/2023 1.44  1.20 - 4.80  x10E9/L Final    Monocytes Absolute 08/15/2023 0.46  0.10 - 1.00 x10E9/L Final    Eosinophils Absolute 08/15/2023 0.32  0.00 - 0.70 x10E9/L Final    Basophils Absolute 08/15/2023 0.05  0.00 - 0.10 x10E9/L Final    Glucose 08/15/2023 97  74 - 99 mg/dL Final    Sodium 08/15/2023 140  136 - 145 mmol/L Final    Potassium 08/15/2023 4.7  3.5 - 5.3 mmol/L Final    Chloride 08/15/2023 103  98 - 107 mmol/L Final    Bicarbonate 08/15/2023 29  21 - 32 mmol/L Final    Anion Gap 08/15/2023 13  10 - 20 mmol/L Final    Urea Nitrogen 08/15/2023 13  6 - 23 mg/dL Final    Creatinine 08/15/2023 0.98  0.50 - 1.30 mg/dL Final    GFR MALE 08/15/2023 86  >90 mL/min/1.73m2 Final    Calcium 08/15/2023 8.8  8.6 - 10.6 mg/dL Final    Albumin 08/15/2023 3.9  3.4 - 5.0 g/dL Final    Alkaline Phosphatase 08/15/2023 71  33 - 136 U/L Final    Total Protein 08/15/2023 6.7  6.4 - 8.2 g/dL Final    AST 08/15/2023 17  9 - 39 U/L Final    Total Bilirubin 08/15/2023 0.3  0.0 - 1.2 mg/dL Final    ALT (SGPT) 08/15/2023 13  10 - 52 U/L Final    Hemoglobin A1C 08/15/2023 5.7 (A)  % Final    Estimated Average Glucose 08/15/2023 117  MG/DL Final    Cholesterol 08/15/2023 146  0 - 199 mg/dL Final    HDL 08/15/2023 41.2  mg/dL Final    Cholesterol/HDL Ratio 08/15/2023 3.5   Final    LDL 08/15/2023 77  0 - 99 mg/dL Final    VLDL 08/15/2023 27  0 - 40 mg/dL Final    Triglycerides 08/15/2023 137  0 - 149 mg/dL Final    TSH 08/15/2023 5.21 (H)  0.44 - 3.98 mIU/L Final    Uric Acid 08/15/2023 4.3  4.0 - 7.5 mg/dL Final    Free T4 08/15/2023 1.00  0.78 - 1.48 ng/dL Final    COMPLEMENT C3 (MG/DL) IN SER/PLAS 08/15/2023 Canceled  87 - 200 mg/dL Corrected    CRP 08/15/2023 Canceled  mg/dL Corrected    COMPLEMENT C4 (MG/DL) IN SER/PLAS 08/15/2023 Canceled  10 - 50 mg/dL Corrected    Sedimentation Rate 08/15/2023 Canceled  0 - 20 mm/h Corrected    Total Protein 08/15/2023 CANCELED  6.4 - 8.2 g/dL Final-Edited    Albumin 08/15/2023 CANCELED   Final-Edited    Alpha 1  08/15/2023 CANCELED   Final-Edited    Alpha 2 08/15/2023 CANCELED   Final-Edited    Beta 08/15/2023 CANCELED   Final-Edited    Gamma 08/15/2023 CANCELED   Final-Edited    SPE Interpretation 08/15/2023 CANCELED   Final-Edited    COMPLEMENT C3 (MG/DL) IN SER/PLAS 08/16/2023 CANCELED   Final-Edited    COMPLEMENT C4 (MG/DL) IN SER/PLAS 08/16/2023 CANCELED   Final-Edited    Citrulline Antibody, IgG 08/16/2023 CANCELED   Final-Edited    WBC 08/16/2023 CANCELED   Final-Edited    nRBC 08/16/2023 CANCELED   Final-Edited    RBC 08/16/2023 CANCELED   Final-Edited    Hemoglobin 08/16/2023 CANCELED   Final-Edited    Hematocrit 08/16/2023 CANCELED   Final-Edited    MCV 08/16/2023 CANCELED   Final-Edited    MCHC 08/16/2023 CANCELED   Final-Edited    Platelets 08/16/2023 CANCELED   Final-Edited    RDW 08/16/2023 CANCELED   Final-Edited    Neutrophils % 08/16/2023 CANCELED   Final-Edited    Immature Granulocytes %, Automated 08/16/2023 CANCELED   Final-Edited    Lymphocytes % 08/16/2023 CANCELED   Final-Edited    Monocytes % 08/16/2023 CANCELED   Final-Edited    Eosinophils % 08/16/2023 CANCELED   Final-Edited    Basophils % 08/16/2023 CANCELED   Final-Edited    Neutrophils Absolute 08/16/2023 CANCELED   Final-Edited    Lymphocytes Absolute 08/16/2023 CANCELED   Final-Edited    Monocytes Absolute 08/16/2023 CANCELED   Final-Edited    Eosinophils Absolute 08/16/2023 CANCELED   Final-Edited    Basophils Absolute 08/16/2023 CANCELED   Final-Edited    MANUAL DIFFERENTIAL Y/N 08/16/2023 CANCELED   Final-Edited    CRP 08/16/2023 CANCELED   Final-Edited    Sedimentation Rate 08/16/2023 CANCELED   Final-Edited    Glucose 08/16/2023 CANCELED   Final-Edited    Sodium 08/16/2023 CANCELED   Final-Edited    Potassium 08/16/2023 CANCELED   Final-Edited    Chloride 08/16/2023 CANCELED   Final-Edited    Bicarbonate 08/16/2023 CANCELED   Final-Edited    Anion Gap 08/16/2023 CANCELED   Final-Edited    Urea Nitrogen 08/16/2023 CANCELED   Final-Edited     Creatinine 08/16/2023 CANCELED   Final-Edited    GFR Female 08/16/2023 CANCELED   Final-Edited    GFR MALE 08/16/2023 CANCELED   Final-Edited    Calcium 08/16/2023 CANCELED   Final-Edited    Albumin 08/16/2023 CANCELED   Final-Edited    Alkaline Phosphatase 08/16/2023 CANCELED   Final-Edited    Total Protein 08/16/2023 CANCELED   Final-Edited    AST 08/16/2023 CANCELED   Final-Edited    Total Bilirubin 08/16/2023 CANCELED   Final-Edited    ALT (SGPT) 08/16/2023 CANCELED   Final-Edited    Path Review - Serum Protein Electr* 08/16/2023 CANCELED   Final-Edited    Total Protein 08/16/2023 CANCELED   Final-Edited    Albumin 08/16/2023 CANCELED   Final-Edited    Alpha 1 08/16/2023 CANCELED   Final-Edited    Alpha 2 08/16/2023 CANCELED   Final-Edited    Beta 08/16/2023 CANCELED   Final-Edited    Gamma 08/16/2023 CANCELED   Final-Edited    SPE Interpretation 08/16/2023 CANCELED   Final-Edited    Citrulline Antibody, IgG 08/15/2023 CANCELED   Final-Edited    Path Review - Serum Protein Electr* 08/15/2023 CANCELED   Final-Edited       Radiology: Reviewed imaging in powerchart.  No results found.      Charting was completed using voice recognition technology and may include unintended errors.

## 2023-11-21 ENCOUNTER — TELEMEDICINE (OUTPATIENT)
Dept: RHEUMATOLOGY | Facility: CLINIC | Age: 63
End: 2023-11-21
Payer: COMMERCIAL

## 2023-11-21 ENCOUNTER — LAB (OUTPATIENT)
Dept: LAB | Facility: LAB | Age: 63
End: 2023-11-21
Payer: COMMERCIAL

## 2023-11-21 ENCOUNTER — TELEPHONE (OUTPATIENT)
Dept: PRIMARY CARE | Facility: CLINIC | Age: 63
End: 2023-11-21

## 2023-11-21 DIAGNOSIS — M35.00 SJOGREN'S SYNDROME, WITH UNSPECIFIED ORGAN INVOLVEMENT (MULTI): ICD-10-CM

## 2023-11-21 DIAGNOSIS — M35.00 SJOGREN'S SYNDROME, WITH UNSPECIFIED ORGAN INVOLVEMENT (MULTI): Primary | ICD-10-CM

## 2023-11-21 LAB
ALBUMIN SERPL BCP-MCNC: 4 G/DL (ref 3.4–5)
ALP SERPL-CCNC: 76 U/L (ref 33–136)
ALT SERPL W P-5'-P-CCNC: 14 U/L (ref 10–52)
ANION GAP SERPL CALC-SCNC: 12 MMOL/L (ref 10–20)
AST SERPL W P-5'-P-CCNC: 17 U/L (ref 9–39)
BASOPHILS # BLD AUTO: 0.08 X10*3/UL (ref 0–0.1)
BASOPHILS NFR BLD AUTO: 1.4 %
BILIRUB SERPL-MCNC: 0.4 MG/DL (ref 0–1.2)
BUN SERPL-MCNC: 16 MG/DL (ref 6–23)
C3 SERPL-MCNC: 160 MG/DL (ref 87–200)
C4 SERPL-MCNC: 36 MG/DL (ref 10–50)
CALCIUM SERPL-MCNC: 9.1 MG/DL (ref 8.6–10.6)
CHLORIDE SERPL-SCNC: 103 MMOL/L (ref 98–107)
CK SERPL-CCNC: 66 U/L (ref 0–325)
CO2 SERPL-SCNC: 31 MMOL/L (ref 21–32)
CREAT SERPL-MCNC: 0.82 MG/DL (ref 0.5–1.3)
EOSINOPHIL # BLD AUTO: 0.35 X10*3/UL (ref 0–0.7)
EOSINOPHIL NFR BLD AUTO: 6.2 %
ERYTHROCYTE [DISTWIDTH] IN BLOOD BY AUTOMATED COUNT: 12.8 % (ref 11.5–14.5)
ERYTHROCYTE [SEDIMENTATION RATE] IN BLOOD BY WESTERGREN METHOD: 17 MM/H (ref 0–20)
GFR SERPL CREATININE-BSD FRML MDRD: >90 ML/MIN/1.73M*2
GLUCOSE SERPL-MCNC: 120 MG/DL (ref 74–99)
HCT VFR BLD AUTO: 39.3 % (ref 41–52)
HGB BLD-MCNC: 12.2 G/DL (ref 13.5–17.5)
IMM GRANULOCYTES # BLD AUTO: 0.01 X10*3/UL (ref 0–0.7)
IMM GRANULOCYTES NFR BLD AUTO: 0.2 % (ref 0–0.9)
LYMPHOCYTES # BLD AUTO: 2.25 X10*3/UL (ref 1.2–4.8)
LYMPHOCYTES NFR BLD AUTO: 39.9 %
MCH RBC QN AUTO: 31.4 PG (ref 26–34)
MCHC RBC AUTO-ENTMCNC: 31 G/DL (ref 32–36)
MCV RBC AUTO: 101 FL (ref 80–100)
MONOCYTES # BLD AUTO: 0.65 X10*3/UL (ref 0.1–1)
MONOCYTES NFR BLD AUTO: 11.5 %
NEUTROPHILS # BLD AUTO: 2.3 X10*3/UL (ref 1.2–7.7)
NEUTROPHILS NFR BLD AUTO: 40.8 %
NRBC BLD-RTO: 0 /100 WBCS (ref 0–0)
PLATELET # BLD AUTO: 229 X10*3/UL (ref 150–450)
POTASSIUM SERPL-SCNC: 4.7 MMOL/L (ref 3.5–5.3)
PROT SERPL-MCNC: 6.8 G/DL (ref 6.4–8.2)
PROT SERPL-MCNC: 6.8 G/DL (ref 6.4–8.2)
RBC # BLD AUTO: 3.89 X10*6/UL (ref 4.5–5.9)
SODIUM SERPL-SCNC: 141 MMOL/L (ref 136–145)
WBC # BLD AUTO: 5.6 X10*3/UL (ref 4.4–11.3)

## 2023-11-21 PROCEDURE — 84165 PROTEIN E-PHORESIS SERUM: CPT

## 2023-11-21 PROCEDURE — 85652 RBC SED RATE AUTOMATED: CPT

## 2023-11-21 PROCEDURE — 99214 OFFICE O/P EST MOD 30 MIN: CPT | Performed by: INTERNAL MEDICINE

## 2023-11-21 PROCEDURE — 82550 ASSAY OF CK (CPK): CPT

## 2023-11-21 PROCEDURE — 86160 COMPLEMENT ANTIGEN: CPT

## 2023-11-21 PROCEDURE — 85025 COMPLETE CBC W/AUTO DIFF WBC: CPT

## 2023-11-21 PROCEDURE — 80053 COMPREHEN METABOLIC PANEL: CPT

## 2023-11-21 PROCEDURE — 36415 COLL VENOUS BLD VENIPUNCTURE: CPT

## 2023-11-21 RX ORDER — MYCOPHENOLATE MOFETIL 250 MG/1
250 CAPSULE ORAL 2 TIMES DAILY
Qty: 180 CAPSULE | Refills: 1 | Status: SHIPPED | OUTPATIENT
Start: 2023-11-21 | End: 2024-04-19

## 2023-11-21 RX ORDER — PREGABALIN 100 MG/1
100 CAPSULE ORAL 2 TIMES DAILY
Qty: 180 CAPSULE | Refills: 1 | Status: SHIPPED | OUTPATIENT
Start: 2023-11-21 | End: 2023-12-01

## 2023-11-21 RX ORDER — PREGABALIN 100 MG/1
100 CAPSULE ORAL 2 TIMES DAILY
Qty: 60 CAPSULE | Refills: 0 | OUTPATIENT
Start: 2023-11-21

## 2023-11-21 RX ORDER — HYDROXYCHLOROQUINE SULFATE 200 MG/1
200 TABLET, FILM COATED ORAL 2 TIMES DAILY
Qty: 180 TABLET | Refills: 1 | Status: SHIPPED | OUTPATIENT
Start: 2023-11-21 | End: 2024-04-19

## 2023-11-21 RX ORDER — PILOCARPINE HYDROCHLORIDE 5 MG/1
5 TABLET, FILM COATED ORAL 3 TIMES DAILY
Qty: 90 TABLET | Refills: 2 | Status: SHIPPED | OUTPATIENT
Start: 2023-11-21

## 2023-11-21 NOTE — PROGRESS NOTES
Virtual or Telephone Consent    An interactive audio and video telecommunication system which permits real time communications between the patient (at the originating site) and provider (at the distant site) was utilized to provide this telehealth service.   Verbal consent was requested and obtained from Ronald Beckham on this date, 11/21/23 for a telehealth visit.        Chief Complaint     Follow up of Sjogren's      Recall: 63 yr old WM with H/O Sjogren's syndrome, anti-SSA and SSB antibody are positive. Reports having dry eyes and mouth and pilocarpine is helping with that.   C/O joint, and back pain. Has had 2 rotator cuff , and lumbar spine surgeries. Uses morphine pump and TENS unit.     HPI: At today's visit the patient reports pain in left shin with walking. Has no numbness or tingling in the leg. Has no leg swelling.   Has morning stiffness lasting all day. Has pain in the joints. Some days notices  swellign in hands or feet.   No rash or Raynaud's        Review of Systems  ROS  Constitutional: C/O fatigue  Head: Denies headaches or hair loss  Eyes: Has dry eyes, but no blurry vision, redness of the eyes, pain in the eyes or H/O uveitis.  ENT: Has dry mouth, but no loss of taste, sores in the mouth, or difficulty swallowing  Cardiovascular: Denies chest pain, palpitations  Respiratory: Denies shortness of breath or cough or wheezing  Gastrointestinal: Has heartburn  Genitourinary: No urethritis  Integumentary: Denies photosensitivity, rash or lesions, Raynaud's or psoriasis  Neurological: Denies any numbness or tingling or weakness  Hematologic/Lymphatic: Denies bleeding, bruising, Raynaud's phenomenon  MSK: As per HPI.        Active Problems  Problems    · Acquired hypothyroidism (244.9) (E03.9)   · Bipolar depression (296.50) (F31.9)   · BPH (benign prostatic hyperplasia) (600.00) (N40.0)   · Edema, unspecified type (782.3) (R60.9)   · GERD (gastroesophageal reflux disease) (530.81) (K21.9)   ·  Hyperlipidemia associated with type 2 diabetes mellitus (250.80,272.4) (E11.69,E78.5)   · Morbid obesity with BMI of 45.0-49.9, adult (278.01,V85.42) (E66.01,Z68.42)   · S/P laparoscopic cholecystectomy (V45.89) (Z90.49)   · Sjogren's disease (710.2) (M35.00)     Past Medical History  Problems    · History of Acute pain of right knee (719.46) (M25.561)   · Resolved Date: 05 Aug 2021   · History of B12 deficiency (266.2) (E53.8)   · Resolved Date: 28 Oct 2019   · History of Bursitis of right knee (726.60) (M70.51)   · Resolved Date: 07 May 2021   · History of Cellulitis of left earlobe (380.10) (H60.12)   · Resolved Date: 11 Jul 2022   · History of Cellulitis of right abdominal wall (682.2) (L03.311)   · Resolved Date: 13 Sep 2021   · History of Cellulitis of right lower extremity (682.6) (L03.115)   · Resolved Date: 04 Dec 2020   · History of Chronic back pain greater than 3 months duration (724.5,338.29)  (M54.9,G89.29)   · Resolved Date: 29 Sep 2022   · History of Diabetes type 2, uncontrolled (250.02)   · Resolved Date: 21 Nov 2022   · History of Gallstones (574.20) (K80.20)   · Resolved Date: 11 Jul 2022   · History of abdominal pain (V13.89) (Z87.898)   · Resolved Date: 01 Sep 2021   · History of acute gouty arthritis (V13.4) (Z87.39)   · Resolved Date: 07 May 2021   · History of anemia (V12.3) (Z86.2)   · Resolved Date: 28 Oct 2019   · History of arthritis (V13.4) (Z87.39)   · Resolved Date: 28 Oct 2019   · History of cellulitis (V13.3) (Z87.2)   · Resolved Date: 16 Jan 2023   · History of chronic obstructive lung disease (V12.69) (Z87.09)   · Resolved Date: 21 Nov 2022   · History of constipation (V12.79) (Z87.19)   · Resolved Date: 11 Jul 2022   · History of contact dermatitis (V13.3) (Z87.2)   · Resolved Date: 15 Yusuf 2021   · History of eczema (V13.3) (Z87.2)   · Resolved Date: 11 Jul 2022   · History of hypoglycemia (V12.29) (Z86.39)   · Resolved Date: 28 Oct 2019   · History of neuropathy (V12.49)  (Z86.69)   · Resolved Date: 11 Jul 2016   · History of neuropathy (V12.49) (Z86.69)   · Resolved Date: 27 Jan 2020   · History of obesity (V12.29) (Z86.39)   · Resolved Date: 03 Sep 2020   · History of polymyalgia rheumatica (V13.59) (Z87.39)   · Resolved Date: 28 Oct 2019   · History of sinusitis (V12.69) (Z87.09)   · Resolved Date: 16 Jan 2023   · History of systemic lupus erythematosus (SLE) (710.0) (M32.9)   · Resolved Date: 28 May 2020   · History of urinary tract infection (V13.02) (Z87.440)   · Resolved Date: 28 Oct 2019   · History of viral infection (V12.09) (Z86.19)   · Resolved Date: 28 Oct 2019   · History of vitamin D deficiency (V12.1) (Z86.39)   · Resolved Date: 28 Oct 2019   · History of Hypertension associated with diabetes (250.80,401.9) (E11.59,I15.2)   · Resolved Date: 17 Sep 2022   · History of Impaired fasting glucose (790.21) (R73.01)   · Resolved Date: 28 Oct 2019   · History of Inflammatory pain of right heel (729.5) (M79.671)   · Resolved Date: 28 Oct 2019   · History of Intertrigo (695.89) (L30.4)   · Resolved Date: 15 Yusuf 2021   · History of Leg wound, left, sequela (906.1) (S81.802S)   · Resolved Date: 04 Nov 2021   · History of Lower extremity edema (782.3) (R60.0)   · Resolved Date: 17 Sep 2022   · History of Lumbago with sciatica, right side (724.3) (M54.41)   · Resolved Date: 28 Oct 2019   · History of Moderate obesity (278.00) (E66.8)   · Resolved Date: 07 May 2021   · History of Moderate obesity (278.00) (E66.8)   · Resolved Date: 03 Mar 2022   · History of Morbid obesity with BMI of 40.0-44.9, adult (278.01,V85.41) (E66.01,Z68.41)   · Resolved Date: 21 Nov 2022   · History of Morbid obesity with BMI of 45.0-49.9, adult (278.01,V85.42) (E66.01,Z68.42)   · Resolved Date: 11 Jul 2022   · History of Osteoarthritis (715.90) (M19.90)   · Resolved Date: 03 Mar 2022   · History of Proteinuria (791.0) (R80.9)   · Resolved Date: 15 Yusuf 2021   · History of Right elbow pain (719.42)  (M25.521)   · Resolved Date: 17 Sep 2022   · History of Right hip pain (719.45) (M25.551)   · Resolved Date: 28 Oct 2019   · History of Right knee pain (719.46) (M25.561)   · Resolved Date: 07 May 2021   · History of Screen for colon cancer (V76.51) (Z12.11)   · Resolved Date: 07 May 2021   · History of Screening for rectal cancer (V76.41) (Z12.12)   · Resolved Date: 07 May 2021   · History of Skin rash (782.1) (R21)   · Resolved Date: 29 Sep 2022   · History of Sunburn of first degree (692.71) (L55.0)   · Resolved Date: 28 Oct 2019     Surgical History  Problems    · History of Back Surgery   · History of Elbow Surgery   · History of Shoulder Surgery     Family History  Mother    · Family history of    · Family history of diabetes mellitus (V18.0) (Z83.3)     Social History  Problems    · Former smoker (V15.82) (Z87.891)     Allergies  Medication    · Abilify   Recorded By: Shakila Mendoza; 2014 1:57:30 PM   · bacitracin   Recorded By: Shirley Haddad MA; 2023 9:37:21 AM   · Benzoin LIQD   Recorded By: Shakila Mendoza; 2014 2:06:17 PM   · Betazyme TABS   Recorded By: Shirley Haddda MA; 2023 9:37:21 AM   · Demerol TABS   Recorded By: Shirley Haddad MA; 2023 9:37:21 AM   · Ex-Lax Stool Softener   Recorded By: Shakila Mendoza; 2014 2:06:17 PM   · gabapentin   Recorded By: Carole Beckford; 3/8/2017 8:15:57 AM   · Latex Gloves MISC   Recorded By: Shakila Mendoza; 2014 1:56:31 PM   · lithium   Recorded By: Shakila Mendoza; 2014 1:58:54 PM   · methotrexate   Recorded By: Carole Beckford; 3/8/2017 8:15:57 AM   · Neosporin OINT   Recorded By: Shakila Mendoza; 2014 1:58:25 PM   · Penicillins   Recorded By: Shakila Mendoza; 2014 1:57:06 PM   · Polycin B OINT   Recorded By: Shirley Haddad MA; 2023 9:37:21 AM   · Theraflu Sinus   Recorded By: Shakila Mendoza; 2014 2:06:17 PM   · tizanidine   Recorded By: Shirley Haddad MA; 2023 9:37:21  AM  NonMedication    · Animal dander - Cats   Recorded By: Shirley Haddad MA; 5/23/2023 9:37:21 AM   · Bee sting   Recorded By: Shirley Haddad MA; 5/23/2023 9:37:21 AM   · Lysol   Recorded By: Tiffanie Whatley; 3/18/2020 7:51:56 AM   · Sun   Recorded By: Shirley Haddad MA; 5/23/2023 9:37:21 AM   · Wasp   Recorded By: Shirley Haddad MA; 5/23/2023 9:37:21 AM     Current Outpatient Medications   Medication Sig Dispense Refill    chlorhexidine (Peridex) 0.12 % solution PLEASE SEE ATTACHED FOR DETAILED DIRECTIONS      Cymbalta 60 mg DR capsule 1 capsule (60 mg) 2 times a day.      dicyclomine (Bentyl) 10 mg capsule Take 1 capsule (10 mg) by mouth 2 times a day. 20 capsule 0    ergocalciferol, vitamin D2, (VITAMIN D2 ORAL) Take 1 tablet by mouth once daily.      esomeprazole (NexIUM) 40 mg DR capsule TAKE 1 CAPSULE BY MOUTH EVERY DAY 90 capsule 3    ferrous sulfate 325 (65 Fe) MG tablet Take 1 tablet (325 mg) by mouth once daily.      fluocinonide 0.05 % cream APPLY SPARINGLY TO AFFECTED AREA 3 TIMES A DAY      hydroxychloroquine (Plaquenil) 200 mg tablet Take 1 tablet (200 mg) by mouth 2 times a day. 180 tablet 1    lamoTRIgine (LaMICtal) 200 mg tablet TAKE 1 TABLET BY MOUTH AT BEDTIME DAILY      levothyroxine (Synthroid, Levoxyl) 175 mcg tablet TAKE 1 TABLET BY MOUTH DAILY MONDAY THROUGH SATURDAY AND 1 & 1/2 TABLETS ON SUNDAYS 90 tablet 3    Lyrica 100 mg capsule Take 1 capsule (100 mg) by mouth 2 times a day. 180 capsule 1    mycophenolate (Cellcept) 250 mg capsule Take 1 capsule (250 mg) by mouth 2 times a day. 180 capsule 1    naloxone (Narcan) 4 mg/0.1 mL nasal spray 1 puff when necessary for signs of overdose      NON FORMULARY Morphine Sulfate      olmesartan (BENIcar) 40 mg tablet TAKE 1 TABLET BY MOUTH EVERY DAY 90 tablet 3    pilocarpine (Salagen) 5 mg tablet Take 1 tablet (5 mg) by mouth 3 times a day. 90 tablet 2    tamsulosin (Flomax) 0.4 mg 24 hr capsule Take by mouth.       No current  facility-administered medications for this visit.               Physical Exam  PE:  General - NAD, sitting up in chair, well-groomed, pleasant, AAOx3  Head: Normocephalic, atraumatic  Eyes - PERRLA, EOMI. No conjunctiva injection.   Mouth/ENT - Moist oral and nasal mucosa. No facial rash.   Musculoskeletal -  .EXAMJOINTDETAILED,  Shoulders: Full ROM, without pain, no swelling, warmth or tenderness.  Elbows: Full ROM, without pain, no swelling, warmth or tenderness.  Wrists: Full ROM, without pain, no swelling, warmth or tenderness.  MCP: No swelling, warmth or tenderness. Metacarpal squeeze negative  PIP: No swelling, warmth or tenderness.  DIP: No swelling, warmth or tenderness.  Hands : 5/5.        Assessment /Plan: Sogren's syndrome is stable. Labs ordered to assess disease activity.   Meds refilled. Upto date on eye exam. Advised to go to a  lab so I can review the results.     Reviewed and approved by ESTHER GOMEZ on 11/21/23 at 9:03 AM.

## 2023-11-21 NOTE — TELEPHONE ENCOUNTER
Pt has pain on left shin and when he walks it hurts worse. Would like to  know if Dr. Jeronimo would like to see him or would he refer him somewhere instead

## 2023-11-23 LAB
ALBUMIN: 3.7 G/DL (ref 3.4–5)
ALPHA 1 GLOBULIN: 0.3 G/DL (ref 0.2–0.6)
ALPHA 2 GLOBULIN: 0.7 G/DL (ref 0.4–1.1)
BETA GLOBULIN: 1.1 G/DL (ref 0.5–1.2)
GAMMA GLOBULIN: 1.1 G/DL (ref 0.5–1.4)
PATH REVIEW-SERUM PROTEIN ELECTROPHORESIS: NORMAL
PROTEIN ELECTROPHORESIS COMMENT: NORMAL

## 2023-11-30 ENCOUNTER — TELEPHONE (OUTPATIENT)
Dept: PRIMARY CARE | Facility: CLINIC | Age: 63
End: 2023-11-30
Payer: COMMERCIAL

## 2023-11-30 NOTE — TELEPHONE ENCOUNTER
PATIENT IS ASKING FOR A CALL BACK FROM [facility]  THIS IS IN REGARDS TO HIS APPOINTMENT THAT HE HAD DOWN AT [facility]

## 2023-12-01 ENCOUNTER — TELEPHONE (OUTPATIENT)
Dept: RHEUMATOLOGY | Facility: CLINIC | Age: 63
End: 2023-12-01

## 2023-12-01 ENCOUNTER — PROCEDURE VISIT (OUTPATIENT)
Dept: PAIN MEDICINE | Facility: CLINIC | Age: 63
End: 2023-12-01
Payer: COMMERCIAL

## 2023-12-01 VITALS
TEMPERATURE: 95.9 F | HEART RATE: 81 BPM | SYSTOLIC BLOOD PRESSURE: 133 MMHG | RESPIRATION RATE: 18 BRPM | DIASTOLIC BLOOD PRESSURE: 62 MMHG

## 2023-12-01 DIAGNOSIS — M96.1 POSTLAMINECTOMY SYNDROME, LUMBAR REGION: Primary | ICD-10-CM

## 2023-12-01 DIAGNOSIS — M96.1 POSTLAMINECTOMY SYNDROME: Primary | ICD-10-CM

## 2023-12-01 PROCEDURE — 62370 ANL SP INF PMP W/MDREPRG&FIL: CPT | Performed by: PAIN MEDICINE

## 2023-12-01 RX ORDER — PREGABALIN 100 MG/1
100 CAPSULE ORAL 2 TIMES DAILY
Qty: 120 CAPSULE | Refills: 1 | Status: SHIPPED | OUTPATIENT
Start: 2023-12-01 | End: 2024-04-16

## 2023-12-01 ASSESSMENT — PATIENT HEALTH QUESTIONNAIRE - PHQ9
1. LITTLE INTEREST OR PLEASURE IN DOING THINGS: NOT AT ALL
SUM OF ALL RESPONSES TO PHQ9 QUESTIONS 1 AND 2: 0
2. FEELING DOWN, DEPRESSED OR HOPELESS: NOT AT ALL

## 2023-12-01 ASSESSMENT — COLUMBIA-SUICIDE SEVERITY RATING SCALE - C-SSRS
1. IN THE PAST MONTH, HAVE YOU WISHED YOU WERE DEAD OR WISHED YOU COULD GO TO SLEEP AND NOT WAKE UP?: NO
2. HAVE YOU ACTUALLY HAD ANY THOUGHTS OF KILLING YOURSELF?: NO
6. HAVE YOU EVER DONE ANYTHING, STARTED TO DO ANYTHING, OR PREPARED TO DO ANYTHING TO END YOUR LIFE?: NO

## 2023-12-01 ASSESSMENT — PAIN - FUNCTIONAL ASSESSMENT: PAIN_FUNCTIONAL_ASSESSMENT: 0-10

## 2023-12-01 ASSESSMENT — PAIN SCALES - GENERAL
PAINLEVEL: 7
PAINLEVEL_OUTOF10: 7

## 2023-12-01 NOTE — TELEPHONE ENCOUNTER
Patient states that [facility] won't fill his script as it was called in as Lyrica and not pregabalin He wants a 90 day supply. Has been out of meds for 5 days

## 2023-12-01 NOTE — PROGRESS NOTES
Patient ID: Ronald Beckham is a 63 y.o. male.    ProceduresPain Pump Refill. Procedure started at 0807 and ended at 0812. Patient tolerated procedure well. Dr. Swenson gave patient is home going instructions.

## 2023-12-01 NOTE — TELEPHONE ENCOUNTER
Dermatology Return Patient Visit    Chief Complaint   Patient presents with   • Follow-Up     COLETTE       Subjective:     HPI:   Michelle Diana is a 53 y.o. female presenting for    Yearly skin exam, one new spot of concern.    HPI/location: LT ankle tan lesion   Time present: 1 year  Painful lesion: No  Itching lesion: No  Enlarging lesion: No  Anything make it better or worse? n/a    History of skin cancer: No  History of precancers/actinic keratoses: Yes, Details: cryo, shave bx  History of biopsies:Yes, Details: was told Dx precancers  History of blistering/severe sunburns:Yes, Details: as achild  Family history of skin cancer:No  Family history of atypical moles:No  Has metastatic breast CA, on systemic treatment (PO)      Rash   Pertinent negatives include no fever.       Past Medical History:   Diagnosis Date   • Cancer (HCC)     left breast   • High cholesterol        Current Outpatient Medications on File Prior to Visit   Medication Sig Dispense Refill   • Ivermectin 1 % Cream 1 Application by Apply externally route every day. 1 Tube 3   • minocycline (MINOCIN) 100 MG Cap 1 capsule twice daily for 6 weeks, then decrease to 1 capsule daily 150 Cap 1   • VERZENIO 150 MG Tab Take 150 mg by mouth 2 Times a Day.     • Fulvestrant (FASLODEX IM) by Intramuscular route.     • letrozole (FEMARA) 2.5 MG Tab Take 2.5 mg by mouth every evening.     • atorvastatin (LIPITOR) 80 MG tablet Take 80 mg by mouth every day.     • aspirin EC (ECOTRIN) 81 MG Tablet Delayed Response Take 81 mg by mouth every day.     • CALCIUM PO Take 2,000 mg by mouth every day.       No current facility-administered medications on file prior to visit.        No Known Allergies    No family history on file.    Social History     Socioeconomic History   • Marital status:      Spouse name: Not on file   • Number of children: Not on file   • Years of education: Not on file   • Highest education level: Not on file   Occupational History  Left message informing pt to schedule with Dr. Thakur.     • Not on file   Social Needs   • Financial resource strain: Not on file   • Food insecurity:     Worry: Not on file     Inability: Not on file   • Transportation needs:     Medical: Not on file     Non-medical: Not on file   Tobacco Use   • Smoking status: Never Smoker   • Smokeless tobacco: Never Used   Substance and Sexual Activity   • Alcohol use: No   • Drug use: No   • Sexual activity: Not on file   Lifestyle   • Physical activity:     Days per week: Not on file     Minutes per session: Not on file   • Stress: Not on file   Relationships   • Social connections:     Talks on phone: Not on file     Gets together: Not on file     Attends Mandaen service: Not on file     Active member of club or organization: Not on file     Attends meetings of clubs or organizations: Not on file     Relationship status: Not on file   • Intimate partner violence:     Fear of current or ex partner: Not on file     Emotionally abused: Not on file     Physically abused: Not on file     Forced sexual activity: Not on file   Other Topics Concern   • Not on file   Social History Narrative   • Not on file       Review of Systems   Constitutional: Negative for chills and fever.   Skin: Positive for itching and rash.   All other systems reviewed and are negative.       Objective:     A full mucocutaneous exam was completed including: scalp, hair, ears, face, eyelids, conjunctiva, lips, gums/tongue/oropharynx, neck, chest, breasts, abdomen, back , left and right upper extremities (including hands/digits and fingernails), left and right lower extremities (including feet/toes, toenails), buttocks and excluding external genitalia (patient refusal') with the following pertinent findings listed below. Remaining above-listed examined areas within normal limits / negative for rashes or lesions.    There were no vitals taken for this visit.    Physical Exam   Constitutional: She is oriented to person, place, and time and well-developed,  well-nourished, and in no distress.   HENT:   Head: Normocephalic and atraumatic.       Right Ear: External ear normal.   Left Ear: External ear normal.   Nose: Nose normal.   Mouth/Throat: Oropharynx is clear and moist.   Eyes: Conjunctivae and lids are normal.   Neck: Normal range of motion. Neck supple.   Cardiovascular: Intact distal pulses.   Pulmonary/Chest: Effort normal.   Neurological: She is alert and oriented to person, place, and time.   Skin: Skin is warm and dry.        Psychiatric: Mood and affect normal.       DATA: none applicable to review    Assessment and Plan:     1. Multiple nevi  - Benign-appearing nature of lesions discussed. Advised to return to clinic for any new or concerning changes.  - ABCDE's of melanoma discussed    2. Lentigines  - Benign-appearing nature of lesions discussed. Advised to return to clinic for any new or concerning changes.    3. Seborrheic keratoses  - Benign-appearing nature of lesions discussed. Advised to return to clinic for any new or concerning changes.    4. Actinic keratoses  CRYOTHERAPY:  Risks (including, but not limited to: hypo or hyperpigmentation, redness, blister, blood blister, recurrence, need for further treatment, infection, scar) and benefits of cryotherapy discussed. Patient verbally agreed to proceed with treatment. 2 cryotherapy freeze thaw cycles of 10 seconds were applied to 2 lesions on the chest with cryac. Patient tolerated procedure well. Aftercare instructions given.      Followup: Return in about 1 year (around 8/21/2020), or if symptoms worsen or fail to improve.    Christin Wesley M.D.

## 2023-12-01 NOTE — PROGRESS NOTES
Patient ID: Ronald Beckham is a 63 y.o. male.    Procedures  Operation  Intrathecal pump refill.    Surgical Indications  The patient is here today to receive an intrathecal pump refill to assist with the pain.    Operative Report  The patient was taken to the procedure area, was placed into a supine positioning. The pump was interrogated and showed a refill volume of 4 mL. The pump then was emptied and a new solution of morphine preservative-free at a dose of 5 mg/mL, a total volume of 20 mL was injected into the pump, and the pump was reprogrammed to deliver a dose of 0.72 mg of morphine with PTM 0.1 mg maximum 3 injections per day.  The alarm date was set for 2/27/2024. The patient recovered for sufficient amount of time and then was discharged home in stable condition. Patient was advised to followup with the [facility] to refill his pump accordingly.     Adult

## 2023-12-14 ENCOUNTER — TELEPHONE (OUTPATIENT)
Dept: PRIMARY CARE | Facility: CLINIC | Age: 63
End: 2023-12-14
Payer: COMMERCIAL

## 2023-12-14 NOTE — TELEPHONE ENCOUNTER
PT took Folic Acid, but Lupus Dr stated for him to stop taking.  He wants Dr Jeronimo's opinion?    What multi Vitamin would Dr recommend?

## 2024-02-23 ENCOUNTER — HOSPITAL ENCOUNTER (OUTPATIENT)
Dept: PAIN MEDICINE | Facility: CLINIC | Age: 64
Discharge: HOME | End: 2024-02-23
Payer: MEDICARE

## 2024-02-23 VITALS — TEMPERATURE: 96.6 F

## 2024-02-23 DIAGNOSIS — M96.1 POSTLAMINECTOMY SYNDROME: Primary | ICD-10-CM

## 2024-02-23 PROCEDURE — 7100000010 HC PHASE TWO TIME - EACH INCREMENTAL 1 MINUTE: Performed by: PAIN MEDICINE

## 2024-02-23 PROCEDURE — 7100000009 HC PHASE TWO TIME - INITIAL BASE CHARGE: Performed by: PAIN MEDICINE

## 2024-02-23 NOTE — DISCHARGE INSTRUCTIONS
INTRATHECAL PAIN PUMP REFILL DISCHARGE INSTRUCTIONS        DO NOT GET INJECTION SITE WET FOR 24 HOURS  IF BANDAGE HAS BEEN PLACED YOU MAY REMOVE AFTER 24 HOURS  MONITOR FOR SIGNS/SYMPTOMS OF INFECTION:FEVERS REDNESS OR INCREASED PAIN AT INJECTION SITE   YOU MAY RESUME YOUR NORMAL ACTIVITY       IF SIGNS OR SYMPTOMS OF OPIATE OVERDOSE ARE NOTED AFTER YOUR REFILL INCLUDING UNUSUAL SLEEPINESS, UNRESPONSIVENESS, SLOW OR ABSENT BREATHING ADMINISTER NARCAN AS DIRECTED AND CALL [phone]    BEFORE YOU LEAVE OUR OFFICE PLEASE SCHEDULE YOUR NEXT APPOINTMENT TO SCHEDULE YOUR NEXT REFILL APPOINTMENT-IT IS IMPORTANT TO KEEP YOUR APPOINTMENTS SO THAT YOUR PUMP DOES NOT RUN OUT OF MEDICATION AS THIS WILL DAMAGE YOUR PUMP    SHOULD YOU HAVE ANY QUESTIONS OR CONCERNS PLEASE CALL THE OFFICE  [phone]

## 2024-02-23 NOTE — OP NOTE
Operation  Intrathecal pump refill.    Surgical Indications  The patient is here today to receive an intrathecal pump refill to assist with the pain.    Operative Report  The patient was taken to the procedure area, was placed into a supine positioning. The pump was interrogated and showed a refill volume of 7 mL. The pump then was emptied and a new solution of morphine preservative-free at a dose of 5 mg/mL, a total volume of 20 mL was injected into the pump, and the pump was reprogrammed to deliver a dose of 0.7197 mg of morphine with PTM 0.1 mg maximum 3 injections per day.  The alarm date was set for May 21, 2024. The patient recovered for sufficient amount of time and then was discharged home in stable condition. Patient was advised to followup with the [facility] to refill his pump accordingly.

## 2024-03-13 ENCOUNTER — TELEPHONE (OUTPATIENT)
Dept: RHEUMATOLOGY | Facility: CLINIC | Age: 64
End: 2024-03-13
Payer: COMMERCIAL

## 2024-03-13 DIAGNOSIS — M35.01 SJOGREN SYNDROME WITH KERATOCONJUNCTIVITIS (MULTI): Primary | ICD-10-CM

## 2024-03-13 NOTE — TELEPHONE ENCOUNTER
Patient is experiencing inflammation in his knee and leg and wants to explore taking Methotrexate or pain relievers. Please advise

## 2024-03-14 RX ORDER — MELOXICAM 15 MG/1
15 TABLET ORAL DAILY
Qty: 30 TABLET | Refills: 1 | Status: SHIPPED | OUTPATIENT
Start: 2024-03-14 | End: 2024-04-18 | Stop reason: SDUPTHER

## 2024-03-14 NOTE — TELEPHONE ENCOUNTER
Pt call returned. Pt states he has been experiencing bilateral hand pain along with pain to both knees and legs, with majority being on the left side present for the past 3 weeks. Pt rates pain 9.5/10 on pain scale when asked. Pt describes the pain as stabbing and radiating in nature. Pt communicated to this nurse previous surgeries to include back, shoulder, and prostate to name a few and states he is used to having pain and has had all his life, but he is looking for a decrease as he explained he's been able to walk 20 feet and then requires a break to sit and experiences more pain going from sitting to standing positions. Pt states other providers he has mentioned that he should inquire about methotrexate and wants to know if provider, Dr. Logan feels that would be appropriate. This nurse to forward inquiry and follow up with pt after. Pt denies other questions at this time.

## 2024-03-14 NOTE — TELEPHONE ENCOUNTER
Call placed to pt to make aware of Mobic order placed by provider2, and of methotrexate not being appropriate. Pt thankful. No other concerns to address.

## 2024-03-30 DIAGNOSIS — M96.1 POSTLAMINECTOMY SYNDROME: ICD-10-CM

## 2024-04-01 ENCOUNTER — OFFICE VISIT (OUTPATIENT)
Dept: PRIMARY CARE | Facility: CLINIC | Age: 64
End: 2024-04-01
Payer: COMMERCIAL

## 2024-04-01 VITALS
HEIGHT: 71 IN | DIASTOLIC BLOOD PRESSURE: 65 MMHG | OXYGEN SATURATION: 93 % | HEART RATE: 79 BPM | WEIGHT: 315 LBS | BODY MASS INDEX: 44.1 KG/M2 | SYSTOLIC BLOOD PRESSURE: 122 MMHG

## 2024-04-01 DIAGNOSIS — Z12.2 ENCOUNTER FOR SCREENING FOR LUNG CANCER: ICD-10-CM

## 2024-04-01 DIAGNOSIS — N40.0 BENIGN PROSTATIC HYPERPLASIA WITHOUT LOWER URINARY TRACT SYMPTOMS: ICD-10-CM

## 2024-04-01 DIAGNOSIS — M96.1 POSTLAMINECTOMY SYNDROME: ICD-10-CM

## 2024-04-01 DIAGNOSIS — F31.31 BIPOLAR AFFECTIVE DISORDER, CURRENTLY DEPRESSED, MILD (MULTI): ICD-10-CM

## 2024-04-01 DIAGNOSIS — J43.2 CENTRILOBULAR EMPHYSEMA (MULTI): ICD-10-CM

## 2024-04-01 DIAGNOSIS — Z13.6 SCREENING FOR ABDOMINAL AORTIC ANEURYSM: ICD-10-CM

## 2024-04-01 DIAGNOSIS — E03.9 ACQUIRED HYPOTHYROIDISM: ICD-10-CM

## 2024-04-01 DIAGNOSIS — Z00.01 ANNUAL VISIT FOR GENERAL ADULT MEDICAL EXAMINATION WITH ABNORMAL FINDINGS: Primary | ICD-10-CM

## 2024-04-01 DIAGNOSIS — M35.00 SJOGREN'S SYNDROME, WITH UNSPECIFIED ORGAN INVOLVEMENT (MULTI): ICD-10-CM

## 2024-04-01 PROBLEM — E11.00 TYPE 2 DIABETES MELLITUS WITH HYPEROSMOLARITY WITHOUT COMA, WITHOUT LONG-TERM CURRENT USE OF INSULIN (MULTI): Status: RESOLVED | Noted: 2023-10-23 | Resolved: 2024-04-01

## 2024-04-01 PROBLEM — E11.00 TYPE 2 DIABETES MELLITUS WITH HYPEROSMOLARITY WITHOUT COMA, WITHOUT LONG-TERM CURRENT USE OF INSULIN (MULTI): Status: ACTIVE | Noted: 2023-10-23

## 2024-04-01 PROCEDURE — 1036F TOBACCO NON-USER: CPT | Performed by: INTERNAL MEDICINE

## 2024-04-01 PROCEDURE — 3008F BODY MASS INDEX DOCD: CPT | Performed by: INTERNAL MEDICINE

## 2024-04-01 PROCEDURE — 4010F ACE/ARB THERAPY RXD/TAKEN: CPT | Performed by: INTERNAL MEDICINE

## 2024-04-01 PROCEDURE — G0439 PPPS, SUBSEQ VISIT: HCPCS | Performed by: INTERNAL MEDICINE

## 2024-04-01 PROCEDURE — 3074F SYST BP LT 130 MM HG: CPT | Performed by: INTERNAL MEDICINE

## 2024-04-01 PROCEDURE — 1160F RVW MEDS BY RX/DR IN RCRD: CPT | Performed by: INTERNAL MEDICINE

## 2024-04-01 PROCEDURE — 3078F DIAST BP <80 MM HG: CPT | Performed by: INTERNAL MEDICINE

## 2024-04-01 PROCEDURE — 99497 ADVNCD CARE PLAN 30 MIN: CPT | Performed by: INTERNAL MEDICINE

## 2024-04-01 PROCEDURE — 99213 OFFICE O/P EST LOW 20 MIN: CPT | Performed by: INTERNAL MEDICINE

## 2024-04-01 PROCEDURE — 1157F ADVNC CARE PLAN IN RCRD: CPT | Performed by: INTERNAL MEDICINE

## 2024-04-01 PROCEDURE — 1159F MED LIST DOCD IN RCRD: CPT | Performed by: INTERNAL MEDICINE

## 2024-04-01 RX ORDER — DULOXETIN HYDROCHLORIDE 60 MG/1
60 CAPSULE, DELAYED RELEASE ORAL 2 TIMES DAILY
COMMUNITY

## 2024-04-01 NOTE — PROGRESS NOTES
Assessment and Plan:  Problem List Items Addressed This Visit       Acquired hypothyroidism     Keep TSH less than 5 TSH twice a year         Bipolar affective disorder, currently depressed, mild (CMS/[facility])     Not suicidal follow-up with psych         BPH (benign prostatic hyperplasia)     Check PSA urinalysis twice a year         COPD (chronic obstructive pulmonary disease) (CMS/[facility])     Sleep apnea's PFT once a year pulmonary evaluation once a year         Relevant Orders    Hemoglobin A1c    Hepatitis C antibody    Referral to Ophthalmology    Albumin , Urine Random    CBC and Auto Differential    Comprehensive Metabolic Panel    Lipid Panel    Prostate Specific Antigen, Screen    TSH with reflex to Free T4 if abnormal    Uric Acid    Microscopic Only, Urine    BMI 50.0-59.9, adult (CMS/[facility])     I spent <15 minutes face to face with individual providing recommendations for nutrition choices and exercise plan to help achieve weight reduction goals. Obesity is systemic disorder and it can bring devastating morbidities in furture. It is a matter of calorie gain and loss, keeping bodybank in negative calorie balance mode is the way to sustain weight loss.Diet has a big role in reducing excess body wt. Scheduled and well planned meals and food intake with watchfulness and understanding of calorie portion and distribution is key to understand. Bariatric surgery is another option if sustained wt loss is not achieved and faced with one or more comorbidities with morbid obesity. Weigh yourself twice a week to understand and follow wt loss goals.           Postlaminectomy syndrome     Follow-up with the pain management and therapy         Sjogren's disease (CMS/[facility])     Follow-up with the rheumatologist and pain management and ophthalmologist         Annual visit for general adult medical examination with abnormal findings - Primary    Relevant Orders    Hemoglobin A1c    Hepatitis C antibody    Referral to Ophthalmology     Albumin , Urine Random    CBC and Auto Differential    Comprehensive Metabolic Panel    Lipid Panel    Prostate Specific Antigen, Screen    TSH with reflex to Free T4 if abnormal    Uric Acid    Microscopic Only, Urine     Other Visit Diagnoses       Screening for abdominal aortic aneurysm        Relevant Orders    Hemoglobin A1c    Hepatitis C antibody    Referral to Ophthalmology    Albumin , Urine Random    CBC and Auto Differential    Comprehensive Metabolic Panel    Lipid Panel    Prostate Specific Antigen, Screen    TSH with reflex to Free T4 if abnormal    Uric Acid    Microscopic Only, Urine    Vascular US Abdominal Aorta Aneurysm AAA Screening    Encounter for screening for lung cancer        Relevant Orders    Hemoglobin A1c    Hepatitis C antibody    Referral to Ophthalmology    Albumin , Urine Random    CBC and Auto Differential    Comprehensive Metabolic Panel    Lipid Panel    Prostate Specific Antigen, Screen    TSH with reflex to Free T4 if abnormal    Uric Acid    Microscopic Only, Urine    CT lung screening low dose              Chief Complaint:   Annual Medicare Wellness Office Exam/Comprehensive Problem Focused Follow Up and Physical Exam chronic back pain chronic anxiety depression behavior problem associated with the hypertension hyperlipidemia hyperglycemia moderate obesity      HPI: 64-year-old patient  with children    2 brother 1 sister positive for obesity    Brother  from GI complication stroke and bleeding    Mother have diabetes    Father  from old age    Brother have multiple myeloma    Surgical history    Laminectomy elbow surgery gallbladder surgery prostate surgery    Ex-smoker nondrinker    He stopped smoking 15 years ago    Recently evaluated by rheumatologist pain management and psych review laboratory medication    Continue weight problem refer to dietitian send geriatric surgery    Negative for suicide    Negative for fall    Negative for dementia    Negative  for febrile illness            Patient Active Problem List:  Patient Active Problem List   Diagnosis    Acquired hypothyroidism    Bipolar affective disorder, currently depressed, mild (CMS/[facility])    BPH (benign prostatic hyperplasia)    COPD (chronic obstructive pulmonary disease) (CMS/[facility])    Hyperlipidemia associated with type 2 diabetes mellitus (CMS/[facility])    BMI 50.0-59.9, adult (CMS/[facility])    Postlaminectomy syndrome    Sjogren's disease (CMS/[facility])    Annual visit for general adult medical examination with abnormal findings          Comprehensive Medical/Surgical/Social/Family History:  Family History   Problem Relation Name Age of Onset    Diabetes Mother         Past Medical History:   Diagnosis Date    Calculus of gallbladder without cholecystitis without obstruction 04/04/2022    Gallstones    Cellulitis of abdominal wall 09/01/2021    Cellulitis of right abdominal wall    Cellulitis of left external ear 06/13/2022    Cellulitis of left earlobe    Cellulitis of right lower limb 09/09/2020    Cellulitis of right lower extremity    Deficiency of other specified B group vitamins 03/19/2019    B12 deficiency    Dorsalgia, unspecified 09/17/2022    Chronic back pain greater than 3 months duration    Encounter for screening for malignant neoplasm of colon 02/04/2021    Screen for colon cancer    Encounter for screening for malignant neoplasm of rectum 02/04/2021    Screening for rectal cancer    Erythema intertrigo 05/09/2021    Intertrigo    Impaired fasting glucose 09/17/2018    Impaired fasting glucose    Localized edema 08/26/2022    Lower extremity edema    Lumbago with sciatica, right side 11/01/2018    Lumbago with sciatica, right side    Morbid (severe) obesity due to excess calories (CMS/[facility]) 11/21/2022    Morbid obesity with BMI of 40.0-44.9, adult    Morbid (severe) obesity due to excess calories (CMS/[facility]) 07/11/2022    Morbid obesity with BMI of 45.0-49.9, adult    Other bursitis of knee, right knee  03/01/2021    Bursitis of right knee    Other conditions influencing health status 11/21/2022    Diabetes type 2, uncontrolled    Other obesity 03/03/2022    Moderate obesity    Other obesity 11/04/2021    Moderate obesity    Pain in right elbow 08/26/2022    Right elbow pain    Pain in right foot 08/16/2017    Inflammatory pain of right heel    Pain in right hip 07/12/2018    Right hip pain    Pain in right knee 06/15/2021    Acute pain of right knee    Pain in right knee 04/05/2021    Right knee pain    Personal history of diseases of the blood and blood-forming organs and certain disorders involving the immune mechanism 03/19/2019    History of anemia    Personal history of diseases of the skin and subcutaneous tissue 05/09/2021    History of contact dermatitis    Personal history of diseases of the skin and subcutaneous tissue 06/13/2022    History of eczema    Personal history of other diseases of the digestive system 04/27/2022    History of constipation    Personal history of other diseases of the musculoskeletal system and connective tissue 09/16/2019    History of polymyalgia rheumatica    Personal history of other diseases of the musculoskeletal system and connective tissue 03/19/2019    History of arthritis    Personal history of other diseases of the musculoskeletal system and connective tissue 03/01/2021    History of acute gouty arthritis    Personal history of other diseases of the nervous system and sense organs 10/28/2019    History of neuropathy    Personal history of other diseases of the nervous system and sense organs 05/26/2016    History of neuropathy    Personal history of other diseases of the respiratory system 11/21/2022    History of chronic obstructive lung disease    Personal history of other endocrine, nutritional and metabolic disease 03/19/2019    History of vitamin D deficiency    Personal history of other endocrine, nutritional and metabolic disease 09/03/2020    History of obesity     Personal history of other endocrine, nutritional and metabolic disease 06/18/2018    History of hypoglycemia    Personal history of other infectious and parasitic diseases 11/30/2015    History of viral infection    Personal history of other specified conditions 08/26/2022    History of edema    Personal history of other specified conditions 08/05/2021    History of abdominal pain    Personal history of urinary (tract) infections 12/23/2014    History of urinary tract infection    Proteinuria, unspecified 05/07/2021    Proteinuria    Rash and other nonspecific skin eruption 09/16/2022    Skin rash    Sunburn of first degree 06/18/2018    Sunburn of first degree    Systemic lupus erythematosus, unspecified (CMS/[facility]) 04/28/2020    History of systemic lupus erythematosus (SLE)    Type 2 diabetes mellitus with other circulatory complications (CMS/[facility]) 09/17/2022    Hypertension associated with diabetes    Unspecified open wound, left lower leg, sequela 09/13/2021    Leg wound, left, sequela    Unspecified osteoarthritis, unspecified site 03/03/2022    Osteoarthritis       Past Surgical History:   Procedure Laterality Date    BACK SURGERY  03/29/2022    Back Surgery    CHOLECYSTECTOMY      ELBOW SURGERY  12/18/2014    Elbow Surgery    SHOULDER SURGERY  12/18/2014    Shoulder Surgery    TONSILLECTOMY         Social History     Socioeconomic History    Marital status:      Spouse name: None    Number of children: None    Years of education: None    Highest education level: None   Occupational History    None   Tobacco Use    Smoking status: Former     Packs/day: 1.50     Years: 40.00     Additional pack years: 0.00     Total pack years: 60.00     Types: Cigarettes    Smokeless tobacco: Never   Substance and Sexual Activity    Alcohol use: Never    Drug use: Never    Sexual activity: None   Other Topics Concern    None   Social History Narrative    None     Social Determinants of Health     Financial Resource  "Strain: Not on file   Food Insecurity: Not on file   Transportation Needs: Not on file   Physical Activity: Not on file   Stress: Not on file   Social Connections: Not on file   Intimate Partner Violence: Not on file   Housing Stability: Not on file       Tobacco/Alcohol/Opioid use, as well as Illicit Drug Use was screened for/reviewed and documented in Social Documentation section of the chart and medication list as appropriate    Allergies and Medications  Allergies   Allergen Reactions    Cat's Claw Shortness of breath and Wheezing     VERIFIED BY ABRAM SEGURA RN    Gabapentin Shortness of breath and Rash    Methotrexate Analogues Shortness of breath    Penicillins Shortness of breath and Other     Unknown reaction    VERIFIED BY ABRAM SEGURA RN    Aripiprazole Swelling and Other     Can't remember reaction    VERIFIED BY ABRAM SEGURA RN    Bacitracin Swelling    Bee Venom Protein (Honey Bee) Unknown    Benzoin Other     Skin peels off -VERIFIED BY ABRAM SEGURA RN    Benzoin Compound Other     Skin peels off     Codeine Unknown    Qqo-Upbzmarje-Qj-Acetaminophen Unknown    Docusate Unknown    Ex-Lax Hives     Blister    Lysolecithin Unknown    Meperidine Hives     Was told he was allergic while in hospital    Meperidine (Pf) Other    Neomycin Unknown    Neomycin-Bacitracin-Polymyxin Other     \"makes it worse instead of making it better\"    Neosporin (Neomycin-Polymyx) Unknown    Other Hives     SUN     Phenolphthalein Swelling and Hives     VERIFIED BY ABRAM SEGURA RN    Phenylephrine-Acetaminophen Swelling     VERIFIED BY ABRAM SEGURA RN    Polymyxin B Sulf-Trimethoprim Other    Povidone-Iodine Other     VERIFIED BY ABRAM SEGURA RN    Pseudoephedrine-Acetaminophen Other    Senna Other    Sennosides Other    Tizanidine Unknown    Wasp Venom Unknown    Latex, Natural Rubber Hives and Rash    Lithium Analogues Rash and Other     Uncontrollable body jerking    VERIFIED BY ABRAM SEGURA RN     Current Outpatient " Medications   Medication Sig Dispense Refill    DULoxetine (Cymbalta) 60 mg DR capsule Take 1 capsule (60 mg) by mouth 2 times a day. Do not crush or chew.      ergocalciferol, vitamin D2, (VITAMIN D2 ORAL) Take 1 tablet by mouth once daily.      esomeprazole (NexIUM) 40 mg DR capsule TAKE 1 CAPSULE BY MOUTH EVERY DAY 90 capsule 3    hydroxychloroquine (Plaquenil) 200 mg tablet Take 1 tablet (200 mg) by mouth 2 times a day. 180 tablet 1    lamoTRIgine (LaMICtal) 200 mg tablet TAKE 1 TABLET BY MOUTH AT BEDTIME DAILY      levothyroxine (Synthroid, Levoxyl) 175 mcg tablet TAKE 1 TABLET BY MOUTH DAILY MONDAY THROUGH SATURDAY AND 1 & 1/2 TABLETS ON SUNDAYS 90 tablet 3    meloxicam (Mobic) 15 mg tablet Take 1 tablet (15 mg) by mouth once daily. 30 tablet 1    mycophenolate (Cellcept) 250 mg capsule Take 1 capsule (250 mg) by mouth 2 times a day. 180 capsule 1    naloxone (Narcan) 4 mg/0.1 mL nasal spray 1 puff when necessary for signs of overdose      NON FORMULARY Morphine Sulfate      olmesartan (BENIcar) 40 mg tablet TAKE 1 TABLET BY MOUTH EVERY DAY 90 tablet 3    pilocarpine (Salagen) 5 mg tablet Take 1 tablet (5 mg) by mouth 3 times a day. 90 tablet 2    tamsulosin (Flomax) 0.4 mg 24 hr capsule Take by mouth.      pregabalin (Lyrica) 100 mg capsule Take 1 capsule (100 mg) by mouth 2 times a day. 120 capsule 1     No current facility-administered medications for this visit.       Medications and Supplements  prescribed by me and other practitioners or clinical pharmacist (such as prescriptions, OTC's, herbal therapies and supplements) were reviewed and documented in the medical record.     Advance directives  Advanced Care Planning (including a Living Will, Healthcare POA, as well as specific end of life choices and/or directives), was discussed for approximately 16 minutes with the patient and/or surrogate, voluntarily, and documented in the Problem List of the medical record.     Cardiac Risk  "Assessment  Cardiovascular risk was discussed and, if needed, lifestyle modifications recommended, including nutritional choices, exercise, and elimination of habits contributing to risk. We agreed on a plan to reduce the current cardiovascular risk based on above discussion as needed.  Aspirin use/disuse was discussed and documented in the Problem List of the medical record after reviewing the updated guidelines below:    Consider low dose Aspirin ( mg) use if the benefit for cardiovascular disease prevention outweighs risk for bleeding complications.   In general, low dose ASA should be considered:  In patients WITHOUT prior MI/stroke/PAD (primary prevention):   a. Age <60: Use if 10-year cardiovascular disease risk >20%, with discussion of risks and benefits with patient  b. Age 60-<70: Use if 10-year cardiovascular disease risk >20% and low bleeding (e.g., gastrointenstinal) risk, with discussion of risks and benefits with patient  c. Age >=70: Do not use    In patients WITH prior MI/stroke/PAD (secondary prevention):   Generally use unless extremely high bleeding (e.g., gastrointenstinal) risk, with discussion of risks and benefits with patient    ROS otherwise negative aside from what was mentioned above in HPI.    Visit Vitals  /65 (BP Location: Right arm, Patient Position: Sitting, BP Cuff Size: Large adult)   Pulse 79   Ht 1.803 m (5' 11\")   Wt (!) 169 kg (373 lb)   SpO2 93%   BMI 52.02 kg/m²   Smoking Status Former   BSA 2.91 m²       Physical Exam  Physical Exam: Severe obesity BMI 52 refer to the dietitian and geriatric surgery    Appearance: Alert, oriented , cooperative,  in no acute distress. Well nourished & well hydrated.    Skin: Phlebitis contact dermatitis lower extremity  Eyes: PERRLA, EOMs intact,  Conjunctiva pink with no redness or exudates. Cornea & anterior chamber are clear, Eyelids without lesions. No scleral icterus.     ENT: Hearing grossly intact. External auditory canals " patent, tympanic membranes intact with visible landmarks. Nares patent, mucus membranes moist. Dentition without lesions. Pharynx clear, uvula midline.     Neck: Supple, without meningismus. Thyroid not palpable. Trachea at midline. No lymphadenopathy.    Pulmonary: Decreased air entry crackles rales rhonchi    Cardiac: Normal S1, S2 without murmur, rub, gallop or extrasystole. No JVD, Carotids without bruits.    Abdomen: Soft, nontender, active bowel sounds.  No palpable organomegaly.  No rebound or guarding.  No CVA tenderness.    Genitourinary: Exam deferred.    Musculoskeletal: Arthritis of lumbosacral spine with a lumbar radiculopathy.    Neurological: Cervical lumbar radiculopathy  Psychiatric: Mental health problem without suicide  During the course of the visit the patient was educated and counseled about age appropriate screening and preventive services. Completed preventive screenings were documented in the chart and orders were placed for outstanding screenings/procedures as documented in the Assessment and Plan.    Patient Instructions (the written plan) was given to the patient at check out.    Charting was completed using voice recognition technology and may include unintended errors.

## 2024-04-01 NOTE — ASSESSMENT & PLAN NOTE
Keep hemoglobin A1c less than 7 hemoglobin A1c twice a year refer to dentist ophthalmology podiatry X once a year

## 2024-04-03 DIAGNOSIS — M35.00 SJOGREN'S SYNDROME, WITH UNSPECIFIED ORGAN INVOLVEMENT (MULTI): ICD-10-CM

## 2024-04-08 ENCOUNTER — TELEPHONE (OUTPATIENT)
Dept: PRIMARY CARE | Facility: CLINIC | Age: 64
End: 2024-04-08
Payer: COMMERCIAL

## 2024-04-08 DIAGNOSIS — M35.01 SJOGREN SYNDROME WITH KERATOCONJUNCTIVITIS (MULTI): ICD-10-CM

## 2024-04-08 NOTE — TELEPHONE ENCOUNTER
PT called to have refilled:  dispregabalin (Lyrica) 100 mg capsule    He was crying, distraught, very agitated.  He said the Dr Logan has not returned his calls and has ignored him.  He said he needs another Dr to replace her.

## 2024-04-08 NOTE — TELEPHONE ENCOUNTER
Patient is experiencing withdrawals from Pregamblin 100 mg and need this refilled as soon as possible.  [facility]

## 2024-04-09 NOTE — TELEPHONE ENCOUNTER
[facility]. BUT WOULD LIKE TO DISCUSS THE COMPARISON TO LYRICA

## 2024-04-10 ENCOUNTER — TELEPHONE (OUTPATIENT)
Dept: PRIMARY CARE | Facility: CLINIC | Age: 64
End: 2024-04-10
Payer: COMMERCIAL

## 2024-04-10 NOTE — TELEPHONE ENCOUNTER
CM/SW Rounding Notes    Current Status:  Significant Events: RN talked with pt and sp as SNF wanted to know about psych meds and sp said psych MD perscribed at night. SW updated SNF.     Clinical barriers to Discharge/Transition: Medical necessity for acute care (ortho to see for clavical fx.)     Prior Admit Status:   Living Situation: Spouse and residing at House.   Support Systems: Spouse/Significant other, Children, Family members, Friends/neighbors  Home Devices/Equipment: Shower chair, Hospital bed, Elevated toilet seat, Blood glucose monitor  Mobility Assist Devices: Standard walker, Wheelchair  Type of Service Prior to Hospitalization: None    Patient/Family Discharge Goal:   SNF    Therapy Recommendation for Discharge:   PT: Patient is appropriate for daily Physical Therapy   OT: Patient is appropriate for daily Occupational Therapy     Discharge Plan/Needs:   Recommendations for Discharge:  [facility]: [phone]     Does the patient need assistance with arranging discharge transportation?: Yes (pt and fam agreeable to w/c van and cost.)  Patient will be picked up by: Marry          PT takes 2 a day of Lyrica and he has 8 left.  Does he finish them and start the Gabapentin or take one a day of Lyrica and then after finished start Gabapentin?      He does want the Gabapentin.

## 2024-04-16 RX ORDER — PREGABALIN 100 MG/1
100 CAPSULE ORAL 2 TIMES DAILY
Qty: 60 CAPSULE | Refills: 3 | Status: SHIPPED | OUTPATIENT
Start: 2024-04-16 | End: 2024-05-06 | Stop reason: ALTCHOICE

## 2024-04-18 RX ORDER — MELOXICAM 15 MG/1
15 TABLET ORAL DAILY
Qty: 30 TABLET | Refills: 1 | Status: SHIPPED | OUTPATIENT
Start: 2024-04-18

## 2024-04-19 RX ORDER — HYDROXYCHLOROQUINE SULFATE 200 MG/1
TABLET, FILM COATED ORAL 2 TIMES DAILY
Qty: 180 TABLET | Refills: 1 | Status: SHIPPED | OUTPATIENT
Start: 2024-04-19

## 2024-04-19 RX ORDER — MYCOPHENOLATE MOFETIL 250 MG/1
250 CAPSULE ORAL 2 TIMES DAILY
Qty: 180 CAPSULE | Refills: 1 | Status: SHIPPED | OUTPATIENT
Start: 2024-04-19

## 2024-04-20 LAB
NON-UH HIE A/G RATIO: 1.1
NON-UH HIE ALB: 3.5 G/DL (ref 3.4–5)
NON-UH HIE ALK PHOS: 83 UNIT/L (ref 45–117)
NON-UH HIE APPEARANCE, U: CLEAR
NON-UH HIE BASO COUNT: 0.04 X1000 (ref 0–0.2)
NON-UH HIE BASOS %: 0.9 %
NON-UH HIE BILIRUBIN, TOTAL: 0.4 MG/DL (ref 0.3–1.2)
NON-UH HIE BILIRUBIN, U: NEGATIVE
NON-UH HIE BLOOD, U: ABNORMAL
NON-UH HIE BUN/CREAT RATIO: 14.4
NON-UH HIE BUN: 13 MG/DL (ref 9–23)
NON-UH HIE CALCIUM OXALATE CRYSTALS: ABNORMAL
NON-UH HIE CALCIUM: 9 MG/DL (ref 8.7–10.4)
NON-UH HIE CALCULATED LDL CHOLESTEROL: 100 MG/DL (ref 60–130)
NON-UH HIE CALCULATED OSMOLALITY: 285 MOSM/KG (ref 275–295)
NON-UH HIE CHLORIDE: 107 MMOL/L (ref 98–107)
NON-UH HIE CHOLESTEROL: 169 MG/DL (ref 100–200)
NON-UH HIE CO2, VENOUS: 30 MMOL/L (ref 20–31)
NON-UH HIE COLOR, U: YELLOW
NON-UH HIE CREATININE, URINE MG/DL: 92.7 MG/DL
NON-UH HIE CREATININE: 0.9 MG/DL (ref 0.6–1.1)
NON-UH HIE DIFF?: NO
NON-UH HIE EOS COUNT: 0.37 X1000 (ref 0–0.5)
NON-UH HIE EOSIN %: 7.9 %
NON-UH HIE FREE T4: 0.99 NG/DL (ref 0.89–1.76)
NON-UH HIE GFR AA: >60
NON-UH HIE GLOBULIN: 3.2 G/DL
NON-UH HIE GLOMERULAR FILTRATION RATE: >60 ML/MIN/1.73M?
NON-UH HIE GLUCOSE QUAL, U: NEGATIVE
NON-UH HIE GLUCOSE: 124 MG/DL (ref 74–106)
NON-UH HIE GOT: 18 UNIT/L (ref 15–37)
NON-UH HIE GPT: 18 UNIT/L (ref 10–49)
NON-UH HIE HCT: 37.9 % (ref 41–52)
NON-UH HIE HDL CHOLESTEROL: 47 MG/DL (ref 40–60)
NON-UH HIE HEPATITIS C ANTIBODY: NONREACTIVE
NON-UH HIE HGB A1C: 5.8 %
NON-UH HIE HGB: 12.7 G/DL (ref 13.5–17.5)
NON-UH HIE INSTR WBC: 4.6
NON-UH HIE K: 4.6 MMOL/L (ref 3.5–5.1)
NON-UH HIE KETONES, U: NEGATIVE
NON-UH HIE LEUKOCYTE ESTERASE, U: NEGATIVE
NON-UH HIE LYMPH %: 40.6 %
NON-UH HIE LYMPH COUNT: 1.88 X1000 (ref 1.2–4.8)
NON-UH HIE MCH: 32.7 PG (ref 27–34)
NON-UH HIE MCHC: 33.5 G/DL (ref 32–37)
NON-UH HIE MCV: 97.6 FL (ref 80–100)
NON-UH HIE MICROALBUMIN, URINE MG/L: <3 MG/L
NON-UH HIE MICROALBUMIN/CREATININE RATIO: <3 MG MALB/GM CREAT (ref 0–30)
NON-UH HIE MONO %: 9.2 %
NON-UH HIE MONO COUNT: 0.42 X1000 (ref 0.1–1)
NON-UH HIE MPV: 10.2 FL (ref 7.4–10.4)
NON-UH HIE MUCOUS, U: ABNORMAL
NON-UH HIE NA: 142 MMOL/L (ref 135–145)
NON-UH HIE NEUTROPHIL %: 41.5 %
NON-UH HIE NEUTROPHIL COUNT (ANC): 1.93 X1000 (ref 1.4–8.8)
NON-UH HIE NITRITE, U: NEGATIVE
NON-UH HIE NUCLEATED RBC: 0 /100WBC
NON-UH HIE PH, U: 5 (ref 4.5–8)
NON-UH HIE PLATELET: 179 X10 (ref 150–450)
NON-UH HIE PROSTATIC SPECIFIC AG SCREEN: <.1 NG/ML (ref 0–4)
NON-UH HIE PROTEIN, U: NEGATIVE
NON-UH HIE RBC/HPF, U: 1 #/HPF (ref 0–3)
NON-UH HIE RBC: 3.89 X10 (ref 4.7–6.1)
NON-UH HIE RDW: 12.9 % (ref 11.5–14.5)
NON-UH HIE SPECIFIC GRAVITY, U: 1.02 (ref 1–1.03)
NON-UH HIE SQUAMOUS EPITHELIAL CELLS, U: 1 #/HPF
NON-UH HIE TOTAL CHOL/HDL CHOL RATIO: 3.6
NON-UH HIE TOTAL PROTEIN: 6.7 G/DL (ref 5.7–8.2)
NON-UH HIE TRIGLYCERIDES: 111 MG/DL (ref 30–150)
NON-UH HIE TSH: 5.31 UIU/ML (ref 0.55–4.78)
NON-UH HIE U MICRO: ABNORMAL
NON-UH HIE URIC ACID: 3.8 MG/DL (ref 3.7–9.2)
NON-UH HIE UROBILINOGEN QUAL, U: ABNORMAL
NON-UH HIE WBC/HPF, U: 2 #/HPF (ref 0–5)
NON-UH HIE WBC: 4.6 X10 (ref 4.5–11)

## 2024-04-22 ENCOUNTER — TELEPHONE (OUTPATIENT)
Dept: PRIMARY CARE | Facility: CLINIC | Age: 64
End: 2024-04-22
Payer: COMMERCIAL

## 2024-04-22 NOTE — TELEPHONE ENCOUNTER
----- Message from Babak Jeronimo MD sent at 4/22/2024  8:49 AM EDT -----  Microscopic hematuria with high TSH high sugar anemia A1c 5.8 PSA normal advise repeat urinalysis and TSH and iron level next visit 3 months

## 2024-04-23 ENCOUNTER — TELEPHONE (OUTPATIENT)
Dept: PRIMARY CARE | Facility: CLINIC | Age: 64
End: 2024-04-23
Payer: MEDICARE

## 2024-04-23 NOTE — TELEPHONE ENCOUNTER
CERTIFICATE OF WORK      October 1, 2020      Re: Freya Roche  [id number] [facility] [id number]      This is to certify that Freya Roche has been under my care from 10/1/2020 and may return to work on Monday, October 5th preferable at reduced hours of less than 6 hours a day for the next two weeks.         REMARKS: will re-evalate in 2 weeks        SIGNATURE:___________________________________________          SANDRA Hernandez  [facility], [facility]  [address] [place] [id number]  Dept Phone: [phone]     SPOKE TO PT

## 2024-04-23 NOTE — TELEPHONE ENCOUNTER
PATIENT CALLED AND SAID HE WAS LEFT A MESSAGE REGARDING A REFERREL TO [facility] BUT HE IS UNSURE WHAT THIS IS FOR.  PLEASE CALL PATIENT BACK

## 2024-05-04 DIAGNOSIS — E03.9 HYPOTHYROIDISM, UNSPECIFIED: ICD-10-CM

## 2024-05-06 ENCOUNTER — LAB (OUTPATIENT)
Dept: LAB | Facility: LAB | Age: 64
End: 2024-05-06
Payer: COMMERCIAL

## 2024-05-06 ENCOUNTER — OFFICE VISIT (OUTPATIENT)
Dept: PRIMARY CARE | Facility: CLINIC | Age: 64
End: 2024-05-06
Payer: COMMERCIAL

## 2024-05-06 VITALS
WEIGHT: 315 LBS | SYSTOLIC BLOOD PRESSURE: 133 MMHG | HEART RATE: 77 BPM | BODY MASS INDEX: 44.1 KG/M2 | HEIGHT: 71 IN | TEMPERATURE: 96.5 F | DIASTOLIC BLOOD PRESSURE: 68 MMHG | OXYGEN SATURATION: 94 %

## 2024-05-06 DIAGNOSIS — E03.9 ACQUIRED HYPOTHYROIDISM: ICD-10-CM

## 2024-05-06 DIAGNOSIS — E78.5 HYPERLIPIDEMIA ASSOCIATED WITH TYPE 2 DIABETES MELLITUS (MULTI): ICD-10-CM

## 2024-05-06 DIAGNOSIS — L74.9 SWEATING ABNORMALITY: ICD-10-CM

## 2024-05-06 DIAGNOSIS — F31.31 BIPOLAR AFFECTIVE DISORDER, CURRENTLY DEPRESSED, MILD (MULTI): ICD-10-CM

## 2024-05-06 DIAGNOSIS — E11.69 HYPERLIPIDEMIA ASSOCIATED WITH TYPE 2 DIABETES MELLITUS (MULTI): ICD-10-CM

## 2024-05-06 DIAGNOSIS — J43.1 PANLOBULAR EMPHYSEMA (MULTI): ICD-10-CM

## 2024-05-06 DIAGNOSIS — M35.00 SJOGREN'S SYNDROME, WITH UNSPECIFIED ORGAN INVOLVEMENT (MULTI): ICD-10-CM

## 2024-05-06 DIAGNOSIS — N40.0 BENIGN PROSTATIC HYPERPLASIA WITHOUT LOWER URINARY TRACT SYMPTOMS: ICD-10-CM

## 2024-05-06 DIAGNOSIS — R61 EXCESSIVE SWEATING: Primary | ICD-10-CM

## 2024-05-06 PROBLEM — Z00.01 ANNUAL VISIT FOR GENERAL ADULT MEDICAL EXAMINATION WITH ABNORMAL FINDINGS: Status: RESOLVED | Noted: 2023-04-17 | Resolved: 2024-05-06

## 2024-05-06 LAB
ALBUMIN SERPL BCP-MCNC: 4 G/DL (ref 3.4–5)
ALP SERPL-CCNC: 70 U/L (ref 33–136)
ALT SERPL W P-5'-P-CCNC: 15 U/L (ref 10–52)
ANION GAP SERPL CALC-SCNC: 13 MMOL/L (ref 10–20)
AST SERPL W P-5'-P-CCNC: 16 U/L (ref 9–39)
BILIRUB SERPL-MCNC: 0.5 MG/DL (ref 0–1.2)
BUN SERPL-MCNC: 12 MG/DL (ref 6–23)
CALCIUM SERPL-MCNC: 9 MG/DL (ref 8.6–10.6)
CHLORIDE SERPL-SCNC: 104 MMOL/L (ref 98–107)
CO2 SERPL-SCNC: 29 MMOL/L (ref 21–32)
CREAT SERPL-MCNC: 0.89 MG/DL (ref 0.5–1.3)
EGFRCR SERPLBLD CKD-EPI 2021: >90 ML/MIN/1.73M*2
GLUCOSE SERPL-MCNC: 113 MG/DL (ref 74–99)
POTASSIUM SERPL-SCNC: 4.5 MMOL/L (ref 3.5–5.3)
PROT SERPL-MCNC: 6.7 G/DL (ref 6.4–8.2)
SODIUM SERPL-SCNC: 141 MMOL/L (ref 136–145)
T4 FREE SERPL-MCNC: 1.23 NG/DL (ref 0.78–1.48)
TSH SERPL-ACNC: 4.92 MIU/L (ref 0.44–3.98)

## 2024-05-06 PROCEDURE — 1036F TOBACCO NON-USER: CPT | Performed by: INTERNAL MEDICINE

## 2024-05-06 PROCEDURE — 3008F BODY MASS INDEX DOCD: CPT | Performed by: INTERNAL MEDICINE

## 2024-05-06 PROCEDURE — 3078F DIAST BP <80 MM HG: CPT | Performed by: INTERNAL MEDICINE

## 2024-05-06 PROCEDURE — 1157F ADVNC CARE PLAN IN RCRD: CPT | Performed by: INTERNAL MEDICINE

## 2024-05-06 PROCEDURE — 1159F MED LIST DOCD IN RCRD: CPT | Performed by: INTERNAL MEDICINE

## 2024-05-06 PROCEDURE — 1160F RVW MEDS BY RX/DR IN RCRD: CPT | Performed by: INTERNAL MEDICINE

## 2024-05-06 PROCEDURE — 84439 ASSAY OF FREE THYROXINE: CPT

## 2024-05-06 PROCEDURE — 84443 ASSAY THYROID STIM HORMONE: CPT

## 2024-05-06 PROCEDURE — 99214 OFFICE O/P EST MOD 30 MIN: CPT | Performed by: INTERNAL MEDICINE

## 2024-05-06 PROCEDURE — 80053 COMPREHEN METABOLIC PANEL: CPT

## 2024-05-06 PROCEDURE — 4010F ACE/ARB THERAPY RXD/TAKEN: CPT | Performed by: INTERNAL MEDICINE

## 2024-05-06 PROCEDURE — 3075F SYST BP GE 130 - 139MM HG: CPT | Performed by: INTERNAL MEDICINE

## 2024-05-06 PROCEDURE — 36415 COLL VENOUS BLD VENIPUNCTURE: CPT

## 2024-05-06 RX ORDER — LEVOTHYROXINE SODIUM 175 UG/1
TABLET ORAL
Qty: 96 TABLET | Refills: 3 | Status: SHIPPED | OUTPATIENT
Start: 2024-05-06

## 2024-05-06 RX ORDER — HYDROXYZINE HYDROCHLORIDE 25 MG/1
25 TABLET, FILM COATED ORAL 2 TIMES DAILY
Qty: 60 TABLET | Refills: 0 | Status: SHIPPED | OUTPATIENT
Start: 2024-05-06 | End: 2024-06-05

## 2024-05-06 NOTE — PROGRESS NOTES
Subjective   Patient ID: Ronald Beckham is a 64 y.o. male who presents for Follow-up (Excessive sweating on going issues ).    Assessment/Plan     Problem List Items Addressed This Visit       Acquired hypothyroidism     Check thyroid parathyroid         Relevant Orders    TSH with reflex to Free T4 if abnormal    Comprehensive Metabolic Panel    Bipolar affective disorder, currently depressed, mild (Multi)     Depression is chronic and quite common and notorious mental health disorder and it is quite common and widespread, there are several therapeutics available for depression now a days. It is considered as chemical imbalance disorder and with medications , it can be adjusted. There are side effects from SSRI and SNRIs but on long term, they are well tolerated. Please do not feel awkward or shy to contact us if feel tired, sleepy, lack of energy or aloof/ alone. Untreated depression can bring serious consequences including suicidal ideations, mental health counselling is available if need arises. Usually treatment of depression is long lasting therapy with periodic evaluations and follow ups. Pt with depression no longer have to feel frustrated, helpless or isolated.Detailed discussion was carried out.           BPH (benign prostatic hyperplasia)    COPD (chronic obstructive pulmonary disease) (Multi)     Pt has moderate to severe COPD. It is progressive disease, use of MDI and proper technique discussed, rinse mouth after inhaled corticosteroids. Notify if progressive dyspnea or cough or lethargy, monitor oxygen saturation if possible with home based pulse oximetry. Avoid hospitalization and call us if any flare ups are about to happen, be sure that annual flu vaccine are uptodate and review need for pneumococcal vaccine. Light to moderate low impact exercises are very helpful and deep breathing  exercises are beneficial in chronic lung diseases.          Hyperlipidemia associated with type 2 diabetes mellitus  (Multi)    Relevant Orders    TSH with reflex to Free T4 if abnormal    Comprehensive Metabolic Panel    BMI 50.0-59.9, adult (Multi)    Sjogren's disease (Multi)    Excessive sweating - Primary     Discussed about medicine induced problem or possible withdrawal advised to check with the pain management and psych meanwhile we discussed about Cymbalta Synthroid or pilocarpine will send patient for endocrine workup check thyroid parathyroid and blood sugar electrolytes and given follow-up given Atarax 25 twice a day          Other Visit Diagnoses       Sweating abnormality        Relevant Medications    hydrOXYzine HCL (Atarax) 25 mg tablet          Patient was evaluated today, problem list was reviewed, problems and concerns addressed, Rx list reviewed and updated, lab and tests were noted and reviewed. Life style changes were discussed, always it works better if we eat plant based diet and plenty of fibres and roughage. Consume adequate amount of water and avoid alcohol, light to moderate physical activities and stress reduction are always beneficial for ongoing physical well being. Do not forget to have 6 to 7 hours of sleep regularly and avoid late night pushpa screen exposure.    HPI 64-year-old patient who had history of bipolar disorder chronic back pain autoimmune disease General grains obesity hypothyroidism sleep apnea seen by the psych and pain management used to take the Lyrica and psych medication the stop taking the Lyrica and Pritikin complaint excessive sweat affecting the head and neck and scalp onset acutely duration 4 weeks progressed acutely aggravating factor possible endocrine problem withdrawal versus medication    Negative for nausea vomiting diarrhea constipation no suicidal or chest pain PND orthopnea or fall  Past Medical History:   Diagnosis Date    Calculus of gallbladder without cholecystitis without obstruction 04/04/2022    Gallstones    Cellulitis of abdominal wall 09/01/2021     Cellulitis of right abdominal wall    Cellulitis of left external ear 06/13/2022    Cellulitis of left earlobe    Cellulitis of right lower limb 09/09/2020    Cellulitis of right lower extremity    Deficiency of other specified B group vitamins 03/19/2019    B12 deficiency    Dorsalgia, unspecified 09/17/2022    Chronic back pain greater than 3 months duration    Encounter for screening for malignant neoplasm of colon 02/04/2021    Screen for colon cancer    Encounter for screening for malignant neoplasm of rectum 02/04/2021    Screening for rectal cancer    Erythema intertrigo 05/09/2021    Intertrigo    Impaired fasting glucose 09/17/2018    Impaired fasting glucose    Localized edema 08/26/2022    Lower extremity edema    Lumbago with sciatica, right side 11/01/2018    Lumbago with sciatica, right side    Morbid (severe) obesity due to excess calories (Multi) 11/21/2022    Morbid obesity with BMI of 40.0-44.9, adult    Morbid (severe) obesity due to excess calories (Multi) 07/11/2022    Morbid obesity with BMI of 45.0-49.9, adult    Other bursitis of knee, right knee 03/01/2021    Bursitis of right knee    Other conditions influencing health status 11/21/2022    Diabetes type 2, uncontrolled    Other obesity 03/03/2022    Moderate obesity    Other obesity 11/04/2021    Moderate obesity    Pain in right elbow 08/26/2022    Right elbow pain    Pain in right foot 08/16/2017    Inflammatory pain of right heel    Pain in right hip 07/12/2018    Right hip pain    Pain in right knee 06/15/2021    Acute pain of right knee    Pain in right knee 04/05/2021    Right knee pain    Personal history of diseases of the blood and blood-forming organs and certain disorders involving the immune mechanism 03/19/2019    History of anemia    Personal history of diseases of the skin and subcutaneous tissue 05/09/2021    History of contact dermatitis    Personal history of diseases of the skin and subcutaneous tissue 06/13/2022     History of eczema    Personal history of other diseases of the digestive system 04/27/2022    History of constipation    Personal history of other diseases of the musculoskeletal system and connective tissue 09/16/2019    History of polymyalgia rheumatica    Personal history of other diseases of the musculoskeletal system and connective tissue 03/19/2019    History of arthritis    Personal history of other diseases of the musculoskeletal system and connective tissue 03/01/2021    History of acute gouty arthritis    Personal history of other diseases of the nervous system and sense organs 10/28/2019    History of neuropathy    Personal history of other diseases of the nervous system and sense organs 05/26/2016    History of neuropathy    Personal history of other diseases of the respiratory system 11/21/2022    History of chronic obstructive lung disease    Personal history of other endocrine, nutritional and metabolic disease 03/19/2019    History of vitamin D deficiency    Personal history of other endocrine, nutritional and metabolic disease 09/03/2020    History of obesity    Personal history of other endocrine, nutritional and metabolic disease 06/18/2018    History of hypoglycemia    Personal history of other infectious and parasitic diseases 11/30/2015    History of viral infection    Personal history of other specified conditions 08/26/2022    History of edema    Personal history of other specified conditions 08/05/2021    History of abdominal pain    Personal history of urinary (tract) infections 12/23/2014    History of urinary tract infection    Proteinuria, unspecified 05/07/2021    Proteinuria    Rash and other nonspecific skin eruption 09/16/2022    Skin rash    Sunburn of first degree 06/18/2018    Sunburn of first degree    Systemic lupus erythematosus, unspecified (Multi) 04/28/2020    History of systemic lupus erythematosus (SLE)    Type 2 diabetes mellitus with other circulatory complications  "(Multi) 09/17/2022    Hypertension associated with diabetes    Unspecified open wound, left lower leg, sequela 09/13/2021    Leg wound, left, sequela    Unspecified osteoarthritis, unspecified site 03/03/2022    Osteoarthritis     Past Surgical History:   Procedure Laterality Date    BACK SURGERY  03/29/2022    Back Surgery    CHOLECYSTECTOMY      ELBOW SURGERY  12/18/2014    Elbow Surgery    SHOULDER SURGERY  12/18/2014    Shoulder Surgery    TONSILLECTOMY       Allergies   Allergen Reactions    Cat's Claw Shortness of breath and Wheezing     VERIFIED BY ABRAM SEGURA RN    Gabapentin Shortness of breath and Rash    Methotrexate Analogues Shortness of breath    Penicillins Shortness of breath and Other     Unknown reaction    VERIFIED BY ABRAM SEGURA RN    Aripiprazole Swelling and Other     Can't remember reaction    VERIFIED BY ABRAM SEGURA RN    Bacitracin Swelling    Bee Venom Protein (Honey Bee) Unknown    Benzoin Other     Skin peels off -VERIFIED BY ABRAM SEGURA RN    Benzoin Compound Other     Skin peels off     Codeine Unknown    Qdl-Jnofaawki-Qa-Acetaminophen Unknown    Docusate Unknown    Ex-Lax Hives     Blister    Lysolecithin Unknown    Meperidine Hives     Was told he was allergic while in hospital    Meperidine (Pf) Other    Neomycin Unknown    Neomycin-Bacitracin-Polymyxin Other     \"makes it worse instead of making it better\"    Neosporin (Neomycin-Polymyx) Unknown    Other Hives     SUN     Phenolphthalein Swelling and Hives     VERIFIED BY ABRAM SEGURA RN    Phenylephrine-Acetaminophen Swelling     VERIFIED BY ABRAM SEGURA RN    Polymyxin B Sulf-Trimethoprim Other    Povidone-Iodine Other     VERIFIED BY ABRAM SEUGRA RN    Pseudoephedrine-Acetaminophen Other    Senna Other    Sennosides Other    Tizanidine Unknown    Wasp Venom Unknown    Latex, Natural Rubber Hives and Rash    Lithium Analogues Rash and Other     Uncontrollable body jerking    VERIFIED BY ABRAM SEGURA RN     Current Outpatient " Medications   Medication Sig Dispense Refill    DULoxetine (Cymbalta) 60 mg DR capsule Take 1 capsule (60 mg) by mouth 2 times a day. Do not crush or chew.      ergocalciferol, vitamin D2, (VITAMIN D2 ORAL) Take 1 tablet by mouth once daily.      esomeprazole (NexIUM) 40 mg DR capsule TAKE 1 CAPSULE BY MOUTH EVERY DAY 90 capsule 3    hydroxychloroquine (Plaquenil) 200 mg tablet TAKE 1 TABLET BY MOUTH TWICE A  tablet 1    lamoTRIgine (LaMICtal) 200 mg tablet TAKE 1 TABLET BY MOUTH AT BEDTIME DAILY      levothyroxine (Synthroid, Levoxyl) 175 mcg tablet TAKE 1 TABLET BY MOUTH DAILY MONDAY THROUGH SATURDAY AND 1 & 1/2 TABLETS ON SUNDAYS 90 tablet 3    meloxicam (Mobic) 15 mg tablet TAKE 1 TABLET BY MOUTH EVERY DAY 30 tablet 1    mycophenolate (Cellcept) 250 mg capsule TAKE 1 CAPSULE (250 MG) BY MOUTH 2 TIMES A DAY. 180 capsule 1    naloxone (Narcan) 4 mg/0.1 mL nasal spray 1 puff when necessary for signs of overdose      NON FORMULARY Morphine Sulfate      olmesartan (BENIcar) 40 mg tablet TAKE 1 TABLET BY MOUTH EVERY DAY 90 tablet 3    pilocarpine (Salagen) 5 mg tablet Take 1 tablet (5 mg) by mouth 3 times a day. 90 tablet 2    tamsulosin (Flomax) 0.4 mg 24 hr capsule Take by mouth.      hydrOXYzine HCL (Atarax) 25 mg tablet Take 1 tablet (25 mg) by mouth 2 times a day. 60 tablet 0     No current facility-administered medications for this visit.     Family History   Problem Relation Name Age of Onset    Diabetes Mother       Social History     Socioeconomic History    Marital status:      Spouse name: None    Number of children: None    Years of education: None    Highest education level: None   Occupational History    None   Tobacco Use    Smoking status: Former     Current packs/day: 1.50     Average packs/day: 1.5 packs/day for 40.0 years (60.0 ttl pk-yrs)     Types: Cigarettes    Smokeless tobacco: Never   Substance and Sexual Activity    Alcohol use: Never    Drug use: Never    Sexual activity: None  "  Other Topics Concern    None   Social History Narrative    None     Social Determinants of Health     Financial Resource Strain: Not on file   Food Insecurity: Not on file   Transportation Needs: Not on file   Physical Activity: Not on file   Stress: Not on file   Social Connections: Not on file   Intimate Partner Violence: Not on file   Housing Stability: Not on file     Immunization History   Administered Date(s) Administered    Flu vaccine (IIV4), preservative free *Check age/dose* 10/07/2023    Hep A / Hep B 01/05/2024    Influenza Nasal, Unspecified 11/18/2013    Influenza, Unspecified 10/05/2012, 11/18/2013, 10/25/2016, 08/14/2017, 09/17/2018, 09/16/2019, 09/03/2020, 09/29/2022    Influenza, seasonal, injectable 10/25/2016    PPD Test 05/28/2012, 05/30/2012    Pfizer COVID-19 vaccine, Fall 2023, 12 years and older, (30mcg/0.3mL) 10/07/2023    Pfizer COVID-19 vaccine, bivalent, age 12 years and older (30 mcg/0.3 mL) 09/19/2022    Pfizer Gray Cap SARS-CoV-2 04/06/2022    Pfizer Purple Cap SARS-CoV-2 03/10/2021, 03/31/2021, 10/15/2021, 09/19/2022    Pneumococcal conjugate vaccine, 20-valent (PREVNAR 20) 04/17/2023    Pneumococcal polysaccharide vaccine, 23-valent, age 2 years and older (PNEUMOVAX 23) 10/28/2019    RESPIRATORY SYNCYTIAL VIRUS (RSV), ELIGIBLE PREGNANT PTS, 0.5 ML (ABRYSVO) 01/05/2024    SARS-CoV-2, Unspecified 04/06/2022    Tdap vaccine, age 7 year and older (BOOSTRIX, ADACEL) 03/15/2018    Zoster vaccine, recombinant, adult (SHINGRIX) 05/01/2018, 08/15/2018, 05/28/2020    Zoster, Unspecified 08/15/2018       Review of Systems  Review of systems is otherwise negative unless stated above or in history of present illness.    Objective   Visit Vitals  /68 (BP Location: Left arm, Patient Position: Sitting, BP Cuff Size: Large adult)   Pulse 77   Temp 35.8 °C (96.5 °F)   Ht 1.803 m (5' 11\")   Wt (!) 165 kg (363 lb)   SpO2 94%   BMI 50.63 kg/m²   Smoking Status Former   BSA 2.87 m²     Physical " Exam  Constitutional: BMI 50     General: not in acute distress.   HENT: Runny nose     Head: Normocephalic and atraumatic.      Nose: Nose normal.   Eyes: No jaundice     Extraocular Movements: Extraocular movements intact.      Conjunctiva/sclera: Conjunctivae normal.   Cardiovascular: Heart murmur     Rate and Rhythm: Normal rate ,  No M/R/G  Pulmonary: Rhonchi     Effort: Pulmonary effort is normal.      Breath sounds: Normal, Bilat Equal AE  Skin: Excessive sweat on the scalp     General: Skin is warm.   Neurological: Neuralgia     Mental Status: He is alert and oriented to person, place, and time.   Psychiatric:    Anxiety disorder     Mood and Affect: Mood normal.         Behavior: Behavior normal.   Musculoskeletal chronic back pain tenderness  FROM in all extremitirs,  Joint-no swelling or tenderness    Orders Only on 04/20/2024   Component Date Value Ref Range Status    [facility] RDW 04/20/2024 12.9  11.5 - 14.5 % Final    [facility] WBC 04/20/2024 4.6  4.5 - 11.0 x10 Final    [facility] MCH 04/20/2024 32.7  27.0 - 34.0 pg Final    [facility] Nucleated RBC 04/20/2024 0  /100WBC Final    [facility] HCT 04/20/2024 37.9 (L)  41.0 - 52.0 % Final    [facility] Platelet 04/20/2024 179  150 - 450 x10 Final    [facility] RBC 04/20/2024 3.89 (L)  4.70 - 6.10 x10 Final    [facility] Instr WBC 04/20/2024 4.6   Final    [facility] MCHC 04/20/2024 33.5  32.0 - 37.0 g/dL Final    [facility] MCV 04/20/2024 97.6  80.0 - 100.0 fL Final    [facility] HGB 04/20/2024 12.7 (L)  13.5 - 17.5 g/dL Final    [facility] MPV 04/20/2024 10.2  7.4 - 10.4 fL Final    [facility] DIFF? 04/20/2024 No   Final    [facility] Neutrophil Count (ANC) 04/20/2024 1.93  1.40 - 8.80 x1000 Final    [facility] Eosin % 04/20/2024 7.9  % Final    [facility] Eos Count 04/20/2024 0.37  0.00 - 0.50 x1000 Final    [facility] Lymph Count 04/20/2024 1.88  1.20 - 4.80 x1000 Final    [facility] Lymph % 04/20/2024 40.6  % Final    [facility] Baso Count  04/20/2024 0.04  0.00 - 0.20 x1000 Final    [facility] Basos % 04/20/2024 0.9  % Final    [facility] Mono % 04/20/2024 9.2  % Final    [facility] Neutrophil % 04/20/2024 41.5  % Final    [facility] Mono Count 04/20/2024 0.42  0.10 - 1.00 x1000 Final    [facility] Prostatic Specific Ag S* 04/20/2024 <.1  0.0 - 4.0 ng/mL Final    [facility] Uric Acid 04/20/2024 3.8  3.7 - 9.2 mg/dL Final    [facility] Na 04/20/2024 142  135 - 145 mmol/L Final    [facility] A/G Ratio 04/20/2024 1.1   Final    [facility] BUN/Creat Ratio 04/20/2024 14.4   Final    [facility] GPT 04/20/2024 18  10 - 49 unit/L Final    [facility] Creatinine 04/20/2024 0.9  0.6 - 1.1 mg/dL Final    [facility] Alk Phos 04/20/2024 83  45 - 117 unit/L Final    [facility] Total Protein 04/20/2024 6.7  5.7 - 8.2 g/dL Final    [facility] CO2, venous 04/20/2024 30.0  20.0 - 31.0 mmol/L Final    [facility] Glomerular Filtration R* 04/20/2024 >60  mL/min/1.73m? Final    [facility] K 04/20/2024 4.6  3.5 - 5.1 mmol/L Final    [facility] Calculated Osmolality 04/20/2024 285  275 - 295 mOsm/kg Final    [facility] BUN 04/20/2024 13  9 - 23 mg/dL Final    [facility] Globulin 04/20/2024 3.2  g/dL Final    [facility] Calcium 04/20/2024 9.0  8.7 - 10.4 mg/dL Final    [facility] GOT 04/20/2024 18  15 - 37 unit/L Final    [facility] ALB 04/20/2024 3.5  3.4 - 5.0 g/dL Final    [facility] Glucose 04/20/2024 124 (H)  74 - 106 mg/dL Final    [facility] GFR AA 04/20/2024 >60   Final    [facility] Chloride 04/20/2024 107  98 - 107 mmol/L Final    [facility] Bilirubin, Total 04/20/2024 0.40  0.30 - 1.20 mg/dL Final    [facility] HDL Cholesterol 04/20/2024 47  40 - 60 mg/dL Final    [facility] Total Chol/HDL Chol Rat* 04/20/2024 3.6   Final    [facility] Triglycerides 04/20/2024 111  30 - 150 mg/dL Final    [facility] Calculated LDL Choleste* 04/20/2024 100  60 - 130 mg/dL Final    [facility] Cholesterol 04/20/2024 169  100 - 200 mg/dL Final    [facility] TSH 04/20/2024 5.31 (H)  0.55  - 4.78 uIU/ml Final    [facility] Free T4 04/20/2024 0.99  0.89 - 1.76 ng/dL Final    [facility] Hepatitis C Antibody 04/20/2024 Nonreactive   Final    [facility] HGB A1C 04/20/2024 5.8  % Final    [facility] Color, U 04/20/2024 Yellow   Final    [facility] pH, U 04/20/2024 5.0  4.5 - 8.0 Final    [facility] Squamous Epithelial María* 04/20/2024 1  #/HPF Final    [facility] Specific Gravity, U 04/20/2024 1.016  1.001 - 1.035 Final    [facility] RBC/HPF, U 04/20/2024 1  0 - 3 #/HPF Final    [facility] Bilirubin, U 04/20/2024 Negative  Negative Final    [facility] Nitrite, U 04/20/2024 Negative  Negative Final    [facility] Appearance, U 04/20/2024 Clear   Final    [facility] Protein, U 04/20/2024 Negative  Negative Final    [facility] Mucous, U 04/20/2024 Occasional   Final    [facility] Blood, U 04/20/2024 Small (A)  Negative Final    [facility] WBC/HPF, U 04/20/2024 2  0 - 5 #/HPF Final    [facility] Leukocyte Esterase, U 04/20/2024 Negative  Negative Final    [facility] Ketones, U 04/20/2024 Negative  Negative Final    [facility] Calcium Oxalate Crystals 04/20/2024 Moderate   Final    [facility] Glucose Qual, U 04/20/2024 Negative  Negative Final    [facility] Urobilinogen Qual, U 04/20/2024 <2.0 mg/dl  <2.0 mg/dl Final    [facility] U MICRO 04/20/2024 Indicated   Final    [facility] Microalbumin, Urine mg/L 04/20/2024 <3.0  mg/L Final    [facility] Microalbumin/Creatinine* 04/20/2024 <3  0 - 30 mg MALB/gm CREAT Final    [facility] Creatinine, Urine mg/dl 04/20/2024 92.7  mg/dL Final       Radiology: Reviewed imaging in powerchart.  No results found.      Charting was completed using voice recognition technology and may include unintended errors.

## 2024-05-06 NOTE — ASSESSMENT & PLAN NOTE
Discussed about medicine induced problem or possible withdrawal advised to check with the pain management and psych meanwhile we discussed about Cymbalta Synthroid or pilocarpine will send patient for endocrine workup check thyroid parathyroid and blood sugar electrolytes and given follow-up given Atarax 25 twice a day

## 2024-05-07 ENCOUNTER — TELEPHONE (OUTPATIENT)
Dept: PRIMARY CARE | Facility: CLINIC | Age: 64
End: 2024-05-07
Payer: COMMERCIAL

## 2024-05-17 ENCOUNTER — HOSPITAL ENCOUNTER (OUTPATIENT)
Dept: PAIN MEDICINE | Facility: CLINIC | Age: 64
Discharge: HOME | End: 2024-05-17
Payer: COMMERCIAL

## 2024-05-17 VITALS
RESPIRATION RATE: 20 BRPM | DIASTOLIC BLOOD PRESSURE: 58 MMHG | TEMPERATURE: 97.7 F | HEART RATE: 85 BPM | SYSTOLIC BLOOD PRESSURE: 129 MMHG | OXYGEN SATURATION: 91 %

## 2024-05-17 DIAGNOSIS — M96.1 POSTLAMINECTOMY SYNDROME: ICD-10-CM

## 2024-05-17 PROCEDURE — 62370 ANL SP INF PMP W/MDREPRG&FIL: CPT | Performed by: PAIN MEDICINE

## 2024-05-17 PROCEDURE — C1894 INTRO/SHEATH, NON-LASER: HCPCS | Performed by: PAIN MEDICINE

## 2024-05-17 PROCEDURE — 2720000007 HC OR 272 NO HCPCS: Performed by: PAIN MEDICINE

## 2024-05-17 ASSESSMENT — PAIN DESCRIPTION - DESCRIPTORS: DESCRIPTORS: ACHING;DULL

## 2024-05-17 ASSESSMENT — PAIN SCALES - GENERAL: PAINLEVEL_OUTOF10: 7

## 2024-05-17 ASSESSMENT — PAIN - FUNCTIONAL ASSESSMENT: PAIN_FUNCTIONAL_ASSESSMENT: 0-10

## 2024-05-17 NOTE — OP NOTE
Operation  Intrathecal pump refill.    Surgical Indications  The patient is here today to receive an intrathecal pump refill to assist with the pain.    Operative Report  The patient was taken to the procedure area, was placed into a supine positioning. The pump was interrogated and showed a refill volume of 7 mL. The pump then was emptied and a new solution of morphine preservative-free at a dose of 5 mg/mL, a total volume of 20 mL was injected into the pump, and the pump was reprogrammed to deliver a dose of 0.719 mg of morphine with PTM 0.1 mg/inj. maximum 2 injections/day per day.  The alarm date was set for 8/23/2024. The patient recovered for sufficient amount of time and then was discharged home in stable condition. Patient was advised to followup with the [facility] to refill his pump accordingly.

## 2024-05-17 NOTE — DISCHARGE INSTRUCTIONS
INTRATHECAL PAIN PUMP REFILL DISCHARGE INSTRUCTIONS        DO NOT GET INJECTION SITE WET FOR 24 HOURS  IF BANDAGE HAS BEEN PLACED YOU MAY REMOVE AFTER 24 HOURS  MONITOR FOR SIGNS/SYMPTOMS OF INFECTION:FEVERS REDNESS OR INCREASED PAIN AT INJECTION SITE   YOU MAY RESUME YOUR NORMAL ACTIVITY       IF SIGNS OR SYMPTOMS OF OPIATE OVERDOSE ARE NOTED AFTER YOUR REFILL INCLUDING UNUSUAL SLEEPINESS, UNRESPONSIVENESS, SLOW OR ABSENT BREATHING ADMINISTER NARCAN AS DIRECTED AND CALL [phone]    BEFORE YOU LEAVE OUR OFFICE PLEASE SCHEDULE YOUR NEXT APPOINTMENT TO SCHEDULE YOUR NEXT REFILL APPOINTMENT-IT IS IMPORTANT TO KEEP YOUR APPOINTMENTS SO THAT YOUR PUMP DOES NOT RUN OUT OF MEDICATION AS THIS WILL DAMAGE YOUR PUMP    SHOULD YOU HAVE ANY QUESTIONS OR CONCERNS PLEASE CALL THE OFFICE  [phone]

## 2024-06-18 ENCOUNTER — APPOINTMENT (OUTPATIENT)
Dept: PRIMARY CARE | Facility: CLINIC | Age: 64
End: 2024-06-18
Payer: COMMERCIAL

## 2024-06-18 VITALS
BODY MASS INDEX: 44.1 KG/M2 | DIASTOLIC BLOOD PRESSURE: 60 MMHG | HEART RATE: 67 BPM | OXYGEN SATURATION: 97 % | WEIGHT: 315 LBS | HEIGHT: 71 IN | SYSTOLIC BLOOD PRESSURE: 136 MMHG | TEMPERATURE: 96.7 F

## 2024-06-18 DIAGNOSIS — F31.31 BIPOLAR AFFECTIVE DISORDER, CURRENTLY DEPRESSED, MILD (MULTI): ICD-10-CM

## 2024-06-18 DIAGNOSIS — M35.05 SJOGREN'S SYNDROME WITH INFLAMMATORY ARTHRITIS (MULTI): ICD-10-CM

## 2024-06-18 DIAGNOSIS — E03.9 ACQUIRED HYPOTHYROIDISM: Primary | ICD-10-CM

## 2024-06-18 DIAGNOSIS — N40.0 BENIGN PROSTATIC HYPERPLASIA WITHOUT LOWER URINARY TRACT SYMPTOMS: ICD-10-CM

## 2024-06-18 DIAGNOSIS — D50.0 IRON DEFICIENCY ANEMIA DUE TO CHRONIC BLOOD LOSS: ICD-10-CM

## 2024-06-18 PROBLEM — R61 EXCESSIVE SWEATING: Status: RESOLVED | Noted: 2024-05-06 | Resolved: 2024-06-18

## 2024-06-18 PROBLEM — J44.9 COPD (CHRONIC OBSTRUCTIVE PULMONARY DISEASE) (MULTI): Status: RESOLVED | Noted: 2023-04-17 | Resolved: 2024-06-18

## 2024-06-18 PROBLEM — E11.69 HYPERLIPIDEMIA ASSOCIATED WITH TYPE 2 DIABETES MELLITUS (MULTI): Status: RESOLVED | Noted: 2023-04-17 | Resolved: 2024-06-18

## 2024-06-18 PROBLEM — E78.5 HYPERLIPIDEMIA ASSOCIATED WITH TYPE 2 DIABETES MELLITUS (MULTI): Status: RESOLVED | Noted: 2023-04-17 | Resolved: 2024-06-18

## 2024-06-18 LAB — HEMOGLOBIN A1C/HEMOGLOBIN TOTAL IN BLOOD EXTERNAL: 5.8 %

## 2024-06-18 PROCEDURE — 1157F ADVNC CARE PLAN IN RCRD: CPT | Performed by: INTERNAL MEDICINE

## 2024-06-18 PROCEDURE — 99213 OFFICE O/P EST LOW 20 MIN: CPT | Performed by: INTERNAL MEDICINE

## 2024-06-18 PROCEDURE — 1036F TOBACCO NON-USER: CPT | Performed by: INTERNAL MEDICINE

## 2024-06-18 PROCEDURE — 1159F MED LIST DOCD IN RCRD: CPT | Performed by: INTERNAL MEDICINE

## 2024-06-18 PROCEDURE — 3008F BODY MASS INDEX DOCD: CPT | Performed by: INTERNAL MEDICINE

## 2024-06-18 PROCEDURE — 1160F RVW MEDS BY RX/DR IN RCRD: CPT | Performed by: INTERNAL MEDICINE

## 2024-06-18 RX ORDER — FERROUS SULFATE 325(65) MG
65 TABLET, DELAYED RELEASE (ENTERIC COATED) ORAL
COMMUNITY

## 2024-06-18 RX ORDER — OXYBUTYNIN CHLORIDE 15 MG/1
1 TABLET, EXTENDED RELEASE ORAL
COMMUNITY
Start: 2024-05-25

## 2024-06-18 RX ORDER — MELOXICAM 15 MG/1
15 TABLET ORAL DAILY
COMMUNITY

## 2024-06-18 NOTE — PROGRESS NOTES
"Subjective   Patient ID: Ronald Beckham \"Nicholas\" is a 64 y.o. male who presents for Follow-up (1 MONTH ).    Assessment/Plan     Problem List Items Addressed This Visit       Acquired hypothyroidism - Primary    Bipolar affective disorder, currently depressed, mild (Multi)     Not suicidal PHQ less than 6 follow-up with psych         BPH (benign prostatic hyperplasia)     Monitor PSA urinalysis once a year         BMI 50.0-59.9, adult (Multi)     I spent <15 minutes face to face with individual providing recommendations for nutrition choices and exercise plan to help achieve weight reduction goals. Obesity is systemic disorder and it can bring devastating morbidities in furture. It is a matter of calorie gain and loss, keeping bodybank in negative calorie balance mode is the way to sustain weight loss.Diet has a big role in reducing excess body wt. Scheduled and well planned meals and food intake with watchfulness and understanding of calorie portion and distribution is key to understand. Bariatric surgery is another option if sustained wt loss is not achieved and faced with one or more comorbidities with morbid obesity. Weigh yourself twice a week to understand and follow wt loss goals.           Sjogren's disease (Multi)     Follow-up with the ophthalmologist rheumatologist          Other Visit Diagnoses       Iron deficiency anemia due to chronic blood loss        Relevant Orders    CBC    Iron and TIBC    Folate    Ferritin    Vitamin B12    Reticulocytes          Patient was evaluated today, problem list was reviewed, problems and concerns addressed, Rx list reviewed and updated, lab and tests were noted and reviewed. Life style changes were discussed, always it works better if we eat plant based diet and plenty of fibres and roughage. Consume adequate amount of water and avoid alcohol, light to moderate physical activities and stress reduction are always beneficial for ongoing physical well being. Do not " forget to have 6 to 7 hours of sleep regularly and avoid late night pushpa screen exposure.    HPI  64-year-old patient have anxiety depression bipolar gastritis BPH junctional disease chronic neck pain back pain hypertension hyperlipidemia proteinuria had eczematoid dermatitis lower extremity obesity with sleep apnea complaining of the decreased ADL mobility arthralgia myalgia fatigue tired weakness    Negative for headache chest pain hematuria rectal bleeding PND orthopnea    Review lab medication discussed with the patient    Currently taking Plaquenil meloxicam Cymbalta lamotrigine morphine iron levothyroxine Nexium olmesartan Flomax.    Review laboratory medication refer patient to psych and.  PT OT and pain management    Vaccinated treated      Past Medical History:   Diagnosis Date    Calculus of gallbladder without cholecystitis without obstruction 04/04/2022    Gallstones    Cellulitis of abdominal wall 09/01/2021    Cellulitis of right abdominal wall    Cellulitis of left external ear 06/13/2022    Cellulitis of left earlobe    Cellulitis of right lower limb 09/09/2020    Cellulitis of right lower extremity    Deficiency of other specified B group vitamins 03/19/2019    B12 deficiency    Dorsalgia, unspecified 09/17/2022    Chronic back pain greater than 3 months duration    Encounter for screening for malignant neoplasm of colon 02/04/2021    Screen for colon cancer    Encounter for screening for malignant neoplasm of rectum 02/04/2021    Screening for rectal cancer    Erythema intertrigo 05/09/2021    Intertrigo    Impaired fasting glucose 09/17/2018    Impaired fasting glucose    Localized edema 08/26/2022    Lower extremity edema    Lumbago with sciatica, right side 11/01/2018    Lumbago with sciatica, right side    Morbid (severe) obesity due to excess calories (Multi) 11/21/2022    Morbid obesity with BMI of 40.0-44.9, adult    Morbid (severe) obesity due to excess calories (Multi) 07/11/2022    Morbid  obesity with BMI of 45.0-49.9, adult    Other bursitis of knee, right knee 03/01/2021    Bursitis of right knee    Other conditions influencing health status 11/21/2022    Diabetes type 2, uncontrolled    Other obesity 03/03/2022    Moderate obesity    Other obesity 11/04/2021    Moderate obesity    Pain in right elbow 08/26/2022    Right elbow pain    Pain in right foot 08/16/2017    Inflammatory pain of right heel    Pain in right hip 07/12/2018    Right hip pain    Pain in right knee 06/15/2021    Acute pain of right knee    Pain in right knee 04/05/2021    Right knee pain    Personal history of diseases of the blood and blood-forming organs and certain disorders involving the immune mechanism 03/19/2019    History of anemia    Personal history of diseases of the skin and subcutaneous tissue 05/09/2021    History of contact dermatitis    Personal history of diseases of the skin and subcutaneous tissue 06/13/2022    History of eczema    Personal history of other diseases of the digestive system 04/27/2022    History of constipation    Personal history of other diseases of the musculoskeletal system and connective tissue 09/16/2019    History of polymyalgia rheumatica    Personal history of other diseases of the musculoskeletal system and connective tissue 03/19/2019    History of arthritis    Personal history of other diseases of the musculoskeletal system and connective tissue 03/01/2021    History of acute gouty arthritis    Personal history of other diseases of the nervous system and sense organs 10/28/2019    History of neuropathy    Personal history of other diseases of the nervous system and sense organs 05/26/2016    History of neuropathy    Personal history of other diseases of the respiratory system 11/21/2022    History of chronic obstructive lung disease    Personal history of other endocrine, nutritional and metabolic disease 03/19/2019    History of vitamin D deficiency    Personal history of other  endocrine, nutritional and metabolic disease 09/03/2020    History of obesity    Personal history of other endocrine, nutritional and metabolic disease 06/18/2018    History of hypoglycemia    Personal history of other infectious and parasitic diseases 11/30/2015    History of viral infection    Personal history of other specified conditions 08/26/2022    History of edema    Personal history of other specified conditions 08/05/2021    History of abdominal pain    Personal history of urinary (tract) infections 12/23/2014    History of urinary tract infection    Proteinuria, unspecified 05/07/2021    Proteinuria    Rash and other nonspecific skin eruption 09/16/2022    Skin rash    Sunburn of first degree 06/18/2018    Sunburn of first degree    Systemic lupus erythematosus, unspecified (Multi) 04/28/2020    History of systemic lupus erythematosus (SLE)    Type 2 diabetes mellitus with other circulatory complications (Multi) 09/17/2022    Hypertension associated with diabetes    Unspecified open wound, left lower leg, sequela 09/13/2021    Leg wound, left, sequela    Unspecified osteoarthritis, unspecified site 03/03/2022    Osteoarthritis     Past Surgical History:   Procedure Laterality Date    BACK SURGERY  03/29/2022    Back Surgery    CHOLECYSTECTOMY      ELBOW SURGERY  12/18/2014    Elbow Surgery    SHOULDER SURGERY  12/18/2014    Shoulder Surgery    TONSILLECTOMY       Allergies   Allergen Reactions    Cat's Claw Shortness of breath and Wheezing     VERIFIED BY ABRAM SEGURA RN    Gabapentin Shortness of breath and Rash    Methotrexate Analogues Shortness of breath    Penicillins Shortness of breath and Other     Unknown reaction    VERIFIED BY ABRAM SEGURA RN    Aripiprazole Swelling and Other     Can't remember reaction    VERIFIED BY ABRAM SEGURA RN    Bacitracin Swelling    Bee Venom Protein (Honey Bee) Unknown    Benzoin Other     Skin peels off -VERIFIED BY ABRAM SEGURA RN    Benzoin Compound Other      "Skin peels off     Codeine Unknown    Bxq-[id number]-Acetaminophen Unknown    Docusate Unknown    Ex-Lax Hives     Blister    Lysolecithin Unknown    Meperidine Hives     Was told he was allergic while in hospital    Meperidine (Pf) Other    Neomycin Unknown    Neomycin-Bacitracin-Polymyxin Other     \"makes it worse instead of making it better\"    Neosporin (Neomycin-Polymyx) Unknown    Other Hives     SUN     Phenolphthalein Swelling and Hives     VERIFIED BY ABRAM SEGURA RN    Phenylephrine-Acetaminophen Swelling     VERIFIED BY ABRAM SEGURA RN    Polymyxin B Sulf-Trimethoprim Other    Povidone-Iodine Other     VERIFIED BY ABRAM SEGURA RN    Pseudoephedrine-Acetaminophen Other    Senna Other    Sennosides Other    Tizanidine Unknown    Wasp Venom Unknown    Latex, Natural Rubber Hives and Rash    Lithium Analogues Rash and Other     Uncontrollable body jerking    VERIFIED BY ABRAM SEGURA RN     Current Outpatient Medications   Medication Sig Dispense Refill    DULoxetine (Cymbalta) 60 mg DR capsule Take 1 capsule (60 mg) by mouth 2 times a day. Do not crush or chew.      esomeprazole (NexIUM) 40 mg DR capsule TAKE 1 CAPSULE BY MOUTH EVERY DAY 90 capsule 3    hydroxychloroquine (Plaquenil) 200 mg tablet TAKE 1 TABLET BY MOUTH TWICE A  tablet 1    lamoTRIgine (LaMICtal) 200 mg tablet TAKE 1 TABLET BY MOUTH AT BEDTIME DAILY      levothyroxine (Synthroid, Levoxyl) 175 mcg tablet TAKE 1 TABLET BY MOUTH DAILY MONDAY THROUGH SATURDAY AND 1 & 1/2 TABLETS ON SUNDAYS 96 tablet 3    mycophenolate (Cellcept) 250 mg capsule TAKE 1 CAPSULE (250 MG) BY MOUTH 2 TIMES A DAY. 180 capsule 1    naloxone (Narcan) 4 mg/0.1 mL nasal spray 1 puff when necessary for signs of overdose      NON FORMULARY Morphine Sulfate      olmesartan (BENIcar) 40 mg tablet TAKE 1 TABLET BY MOUTH EVERY DAY 90 tablet 3    pilocarpine (Salagen) 5 mg tablet Take 1 tablet (5 mg) by mouth 3 times a day. 90 tablet 2    tamsulosin (Flomax) 0.4 mg 24 hr " capsule Take by mouth.       No current facility-administered medications for this visit.     Family History   Problem Relation Name Age of Onset    Diabetes Mother       Social History     Socioeconomic History    Marital status:      Spouse name: None    Number of children: None    Years of education: None    Highest education level: None   Occupational History    None   Tobacco Use    Smoking status: Former     Current packs/day: 1.50     Average packs/day: 1.5 packs/day for 40.0 years (60.0 ttl pk-yrs)     Types: Cigarettes    Smokeless tobacco: Never   Substance and Sexual Activity    Alcohol use: Never    Drug use: Never    Sexual activity: None   Other Topics Concern    None   Social History Narrative    None     Social Determinants of Health     Financial Resource Strain: Not on file   Food Insecurity: Not on file   Transportation Needs: Not on file   Physical Activity: Not on file   Stress: Not on file   Social Connections: Not on file   Intimate Partner Violence: Not on file   Housing Stability: Not on file     Immunization History   Administered Date(s) Administered    Flu vaccine (IIV4), preservative free *Check age/dose* 10/07/2023    Hep A / Hep B 01/05/2024, 05/06/2024    Influenza Nasal, Unspecified 11/18/2013    Influenza, Unspecified 10/05/2012, 11/18/2013, 10/25/2016, 08/14/2017, 09/17/2018, 09/16/2019, 09/03/2020, 09/29/2022    Influenza, seasonal, injectable 10/25/2016    MMR vaccine, subcutaneous (MMR II) 05/06/2024    PPD Test 05/28/2012, 05/30/2012    Pfizer COVID-19 vaccine, Fall 2023, 12 years and older, (30mcg/0.3mL) 10/07/2023, 05/06/2024    Pfizer COVID-19 vaccine, bivalent, age 12 years and older (30 mcg/0.3 mL) 09/19/2022    Pfizer Gray Cap SARS-CoV-2 04/06/2022    Pfizer Purple Cap SARS-CoV-2 03/10/2021, 03/31/2021, 10/15/2021, 09/19/2022    Pneumococcal conjugate vaccine, 20-valent (PREVNAR 20) 04/17/2023    Pneumococcal polysaccharide vaccine, 23-valent, age 2 years and older  "(PNEUMOVAX 23) 10/28/2019    RESPIRATORY SYNCYTIAL VIRUS (RSV), ELIGIBLE PREGNANT PTS, 0.5 ML (ABRYSVO) 01/05/2024    SARS-CoV-2, Unspecified 04/06/2022    Td vaccine, age 7 years and older (TENIVAC) 05/06/2024    Tdap vaccine, age 7 year and older (BOOSTRIX, ADACEL) 03/15/2018    Zoster vaccine, recombinant, adult (SHINGRIX) 05/01/2018, 08/15/2018, 05/28/2020    Zoster, Unspecified 08/15/2018       Review of Systems  Review of systems is otherwise negative unless stated above or in history of present illness.    Objective   Visit Vitals  /60 (BP Location: Left arm, Patient Position: Sitting, BP Cuff Size: Large adult)   Pulse 67   Temp 35.9 °C (96.7 °F)   Ht 1.803 m (5' 11\")   Wt (!) 165 kg (364 lb)   SpO2 97%   BMI 50.77 kg/m²   Smoking Status Former   BSA 2.87 m²     Physical Exam  Constitutional: BMI 50     General: not in acute distress.   HENT:      Head: Normocephalic and atraumatic.      Nose: Nose normal.   Eyes: Eyeglasses     Extraocular Movements: Extraocular movements intact.      Conjunctiva/sclera: Conjunctivae normal.   Cardiovascular: S4     Rate and Rhythm: Normal rate ,  No M/R/G  Pulmonary: Rhonchi     Effort: Pulmonary effort is normal.      Breath sounds: Normal, Bilat Equal AE  Skin: Eczematoid dermatitis affecting the both feet     General: Skin is warm.   Neurological: Lumbar cervical radiculopathy     Mental Status: He is alert and oriented to person, place, and time.   Psychiatric: Not suicidal   Mood and Affect: Mood normal.         Behavior: Behavior normal.   Musculoskeletal advanced osteoarthritis of spine  FROM in all extremitirs,  Joint-no swelling or tenderness  Hyperglycemia low-carb diet thyroid dysfunction T3-T4 TSH mild anemia Slow Fe  Lab on 05/06/2024   Component Date Value Ref Range Status    Thyroid Stimulating Hormone 05/06/2024 4.92 (H)  0.44 - 3.98 mIU/L Final    Glucose 05/06/2024 113 (H)  74 - 99 mg/dL Final    Sodium 05/06/2024 141  136 - 145 mmol/L Final    " Potassium 05/06/2024 4.5  3.5 - 5.3 mmol/L Final    Chloride 05/06/2024 104  98 - 107 mmol/L Final    Bicarbonate 05/06/2024 29  21 - 32 mmol/L Final    Anion Gap 05/06/2024 13  10 - 20 mmol/L Final    Urea Nitrogen 05/06/2024 12  6 - 23 mg/dL Final    Creatinine 05/06/2024 0.89  0.50 - 1.30 mg/dL Final    eGFR 05/06/2024 >90  >60 mL/min/1.73m*2 Final    Calcium 05/06/2024 9.0  8.6 - 10.6 mg/dL Final    Albumin 05/06/2024 4.0  3.4 - 5.0 g/dL Final    Alkaline Phosphatase 05/06/2024 70  33 - 136 U/L Final    Total Protein 05/06/2024 6.7  6.4 - 8.2 g/dL Final    AST 05/06/2024 16  9 - 39 U/L Final    Bilirubin, Total 05/06/2024 0.5  0.0 - 1.2 mg/dL Final    ALT 05/06/2024 15  10 - 52 U/L Final    Thyroxine, Free 05/06/2024 1.23  0.78 - 1.48 ng/dL Final   Orders Only on 04/20/2024   Component Date Value Ref Range Status    [facility] RDW 04/20/2024 12.9  11.5 - 14.5 % Final    [facility] WBC 04/20/2024 4.6  4.5 - 11.0 x10 Final    [facility] MCH 04/20/2024 32.7  27.0 - 34.0 pg Final    [facility] Nucleated RBC 04/20/2024 0  /100WBC Final    [facility] HCT 04/20/2024 37.9 (L)  41.0 - 52.0 % Final    [facility] Platelet 04/20/2024 179  150 - 450 x10 Final    [facility] RBC 04/20/2024 3.89 (L)  4.70 - 6.10 x10 Final    [facility] Instr WBC 04/20/2024 4.6   Final    [facility] MCHC 04/20/2024 33.5  32.0 - 37.0 g/dL Final    [facility] MCV 04/20/2024 97.6  80.0 - 100.0 fL Final    [facility] HGB 04/20/2024 12.7 (L)  13.5 - 17.5 g/dL Final    [facility] MPV 04/20/2024 10.2  7.4 - 10.4 fL Final    [facility] DIFF? 04/20/2024 No   Final    [facility] Neutrophil Count (ANC) 04/20/2024 1.93  1.40 - 8.80 x1000 Final    [facility] Eosin % 04/20/2024 7.9  % Final    [facility] Eos Count 04/20/2024 0.37  0.00 - 0.50 x1000 Final    [facility] Lymph Count 04/20/2024 1.88  1.20 - 4.80 x1000 Final    [facility] Lymph % 04/20/2024 40.6  % Final    [facility] Baso Count 04/20/2024 0.04  0.00 - 0.20 x1000 Final    [facility] Basos %  04/20/2024 0.9  % Final    [facility] Mono % 04/20/2024 9.2  % Final    [facility] Neutrophil % 04/20/2024 41.5  % Final    [facility] Mono Count 04/20/2024 0.42  0.10 - 1.00 x1000 Final    [facility] Prostatic Specific Ag S* 04/20/2024 <.1  0.0 - 4.0 ng/mL Final    [facility] Uric Acid 04/20/2024 3.8  3.7 - 9.2 mg/dL Final    [facility] Na 04/20/2024 142  135 - 145 mmol/L Final    [facility] A/G Ratio 04/20/2024 1.1   Final    [facility] BUN/Creat Ratio 04/20/2024 14.4   Final    [facility] GPT 04/20/2024 18  10 - 49 unit/L Final    [facility] Creatinine 04/20/2024 0.9  0.6 - 1.1 mg/dL Final    [facility] Alk Phos 04/20/2024 83  45 - 117 unit/L Final    [facility] Total Protein 04/20/2024 6.7  5.7 - 8.2 g/dL Final    [facility] CO2, venous 04/20/2024 30.0  20.0 - 31.0 mmol/L Final    [facility] Glomerular Filtration R* 04/20/2024 >60  mL/min/1.73m? Final    [facility] K 04/20/2024 4.6  3.5 - 5.1 mmol/L Final    [facility] Calculated Osmolality 04/20/2024 285  275 - 295 mOsm/kg Final    [facility] BUN 04/20/2024 13  9 - 23 mg/dL Final    [facility] Globulin 04/20/2024 3.2  g/dL Final    [facility] Calcium 04/20/2024 9.0  8.7 - 10.4 mg/dL Final    [facility] GOT 04/20/2024 18  15 - 37 unit/L Final    [facility] ALB 04/20/2024 3.5  3.4 - 5.0 g/dL Final    [facility] Glucose 04/20/2024 124 (H)  74 - 106 mg/dL Final    [facility] GFR AA 04/20/2024 >60   Final    [facility] Chloride 04/20/2024 107  98 - 107 mmol/L Final    [facility] Bilirubin, Total 04/20/2024 0.40  0.30 - 1.20 mg/dL Final    [facility] HDL Cholesterol 04/20/2024 47  40 - 60 mg/dL Final    [facility] Total Chol/HDL Chol Rat* 04/20/2024 3.6   Final    [facility] Triglycerides 04/20/2024 111  30 - 150 mg/dL Final    [facility] Calculated LDL Choleste* 04/20/2024 100  60 - 130 mg/dL Final    [facility] Cholesterol 04/20/2024 169  100 - 200 mg/dL Final    [facility] TSH 04/20/2024 5.31 (H)  0.55 - 4.78 uIU/ml Final    [facility] Free T4 04/20/2024 0.99   0.89 - 1.76 ng/dL Final    [facility] Hepatitis C Antibody 04/20/2024 Nonreactive   Final    [facility] HGB A1C 04/20/2024 5.8  % Final    [facility] Color, U 04/20/2024 Yellow   Final    [facility] pH, U 04/20/2024 5.0  4.5 - 8.0 Final    [facility] Squamous Epithelial María* 04/20/2024 1  #/HPF Final    [facility] Specific Gravity, U 04/20/2024 1.016  1.001 - 1.035 Final    [facility] RBC/HPF, U 04/20/2024 1  0 - 3 #/HPF Final    [facility] Bilirubin, U 04/20/2024 Negative  Negative Final    [facility] Nitrite, U 04/20/2024 Negative  Negative Final    [facility] Appearance, U 04/20/2024 Clear   Final    [facility] Protein, U 04/20/2024 Negative  Negative Final    [facility] Mucous, U 04/20/2024 Occasional   Final    [facility] Blood, U 04/20/2024 Small (A)  Negative Final    [facility] WBC/HPF, U 04/20/2024 2  0 - 5 #/HPF Final    [facility] Leukocyte Esterase, U 04/20/2024 Negative  Negative Final    [facility] Ketones, U 04/20/2024 Negative  Negative Final    [facility] Calcium Oxalate Crystals 04/20/2024 Moderate   Final    [facility] Glucose Qual, U 04/20/2024 Negative  Negative Final    [facility] Urobilinogen Qual, U 04/20/2024 <2.0 mg/dl  <2.0 mg/dl Final    [facility] U MICRO 04/20/2024 Indicated   Final    [facility] Microalbumin, Urine mg/L 04/20/2024 <3.0  mg/L Final    [facility] Microalbumin/Creatinine* 04/20/2024 <3  0 - 30 mg MALB/gm CREAT Final    [facility] Creatinine, Urine mg/dl 04/20/2024 92.7  mg/dL Final       Radiology: Reviewed imaging in powerchart.  US duplex extremity veins limited unilateral    Result Date: 5/28/2024  * * *Final Report* * * DATE OF EXAM: May 28 2024  8:41AM   JAVED   1198  -  US HAND/WRIST SYNOVIAL SCREEN LT  / ACCESSION #  [id number] PROCEDURE REASON: multiple diagnoses      * * * * Physician Interpretation * * * *  MSK_US SYNOVITIS SCREENING ULTRASOUND OF THE HANDS AND WRISTS: CLINICAL INFORMATION: Sjogren's syndrome with dental involvement.   Chronic pain in joints.  TECHNIQUE: Grey-scale, real-time ultrasound of both the right and left hand and wrist synovium and tenosynovium was performed with power Doppler examination. Images were saved to the permanent image archive. v1-2020 COMPARISON: X-ray 03/13/2024. FINDINGS: RIGHT SIDE: RIGHT MCP AND PIP JOINT SYNOVIUM: 2ND MCP: Hypertrophy: None. Power Doppler: None. 2ND PIP: Hypertrophy: Mild. Power Doppler: None. 3RD MCP: Hypertrophy: Mild. Power Doppler: Mild. 3RD PIP: Hypertrophy: None. Power Doppler: None. 4TH MCP: Hypertrophy: None. Power Doppler: None. 4TH PIP: Hypertrophy: None. Power Doppler: None. 5TH MCP: Hypertrophy: None. Power Doppler: None. 5TH PIP: Hypertrophy: None. Power Doppler: None. RIGHT EXTENSOR AND FLEXOR TENOSYNOVIUM: 2ND Digit Flexor: Hypertrophy: None. Power Doppler: None. 2ND Digit Extensor: Hypertrophy: None. Power Doppler: None. 3RD Digit Flexor: Hypertrophy: None. Power Doppler: None. 3RD Digit Extensor: Hypertrophy: None. Power Doppler: None. 4TH Digit Flexor: Hypertrophy: None. Power Doppler: None. 4TH Digit Extensor: Hypertrophy: None. Power Doppler: None. 5TH Digit Flexor: Hypertrophy: None. Power Doppler: None. 5TH Digit Extensor: Hypertrophy: None. Power Doppler: None. CARPUS Synovitis: Hypertrophy: Mild. Power Doppler: Mild hyperemia in the ulnocarpal joint. OTHER: There is 2 x 2 x 2 mm ganglion arising from the third flexor tendon sheath. LEFT SIDE: LEFT MCP AND PIP JOINT SYNOVIUM: 2ND MCP: Hypertrophy: Mild. Power Doppler: Mild. 2ND PIP: Hypertrophy: None. Power Doppler: None. 3RD MCP: Hypertrophy: None. Power Doppler: None. 3RD PIP: Hypertrophy: None. Power Doppler: None. 4TH MCP: Hypertrophy: None. Power Doppler: None. 4TH PIP: Hypertrophy: None. Power Doppler: None. 5TH MCP: Hypertrophy: None. Power Doppler: None. 5TH PIP: Hypertrophy: None. Power Doppler: None. LEFT EXTENSOR AND FLEXOR TENOSYNOVIUM: 2ND Digit Flexor: Hypertrophy: None. Power Doppler: None. 2ND Digit Extensor: Hypertrophy:  None. Power Doppler: None. 3RD Digit Flexor: Hypertrophy: None. Power Doppler: None. 3RD Digit Extensor: Hypertrophy: None. Power Doppler: None. 4TH Digit Flexor: Hypertrophy: None. Power Doppler: None. 4TH Digit Extensor: Hypertrophy: None. Power Doppler: None. 5TH Digit Flexor: Hypertrophy: None. Power Doppler: None. 5TH Digit Extensor: Hypertrophy: None. Power Doppler: None. CARPUS Synovitis: Hypertrophy: None. Power Doppler: None. OTHER: None.    IMPRESSION: MILD ACTIVE SYNOVITIS IN THE RIGHT THIRD AND LEFT SECOND MCP JOINTS, AS WELL AS THE RIGHT ULNOCARPAL JOINT. : 5o9   Transcribe Date/Time: May 28 2024  8:48A Dictated by : ALBERT WHITTINGTON MD This examination was interpreted and the report reviewed and electronically signed by: ALBERT WHITTINGTON MD on May 28 2024  9:05AM  EST    US duplex extremity veins limited unilateral    Result Date: 5/28/2024  * * *Final Report* * * DATE OF EXAM: May 28 2024  8:30AM   JAVED   1197  -  US HAND/WRIST SYNOVIAL SCREEN RT  / ACCESSION #  [id number] PROCEDURE REASON: multiple diagnoses      * * * * Physician Interpretation * * * *  MSK_US SYNOVITIS SCREENING ULTRASOUND OF THE HANDS AND WRISTS: CLINICAL INFORMATION: Sjogren's syndrome with dental involvement.   Chronic pain in joints. TECHNIQUE: Grey-scale, real-time ultrasound of both the right and left hand and wrist synovium and tenosynovium was performed with power Doppler examination. Images were saved to the permanent image archive. v1-2020 COMPARISON: X-ray 03/13/2024. FINDINGS: RIGHT SIDE: RIGHT MCP AND PIP JOINT SYNOVIUM: 2ND MCP: Hypertrophy: None. Power Doppler: None. 2ND PIP: Hypertrophy: Mild. Power Doppler: None. 3RD MCP: Hypertrophy: Mild. Power Doppler: Mild. 3RD PIP: Hypertrophy: None. Power Doppler: None. 4TH MCP: Hypertrophy: None. Power Doppler: None. 4TH PIP: Hypertrophy: None. Power Doppler: None. 5TH MCP: Hypertrophy: None. Power Doppler: None. 5TH PIP: Hypertrophy: None. Power Doppler:  None. RIGHT EXTENSOR AND FLEXOR TENOSYNOVIUM: 2ND Digit Flexor: Hypertrophy: None. Power Doppler: None. 2ND Digit Extensor: Hypertrophy: None. Power Doppler: None. 3RD Digit Flexor: Hypertrophy: None. Power Doppler: None. 3RD Digit Extensor: Hypertrophy: None. Power Doppler: None. 4TH Digit Flexor: Hypertrophy: None. Power Doppler: None. 4TH Digit Extensor: Hypertrophy: None. Power Doppler: None. 5TH Digit Flexor: Hypertrophy: None. Power Doppler: None. 5TH Digit Extensor: Hypertrophy: None. Power Doppler: None. CARPUS Synovitis: Hypertrophy: Mild. Power Doppler: Mild hyperemia in the ulnocarpal joint. OTHER: There is 2 x 2 x 2 mm ganglion arising from the third flexor tendon sheath. LEFT SIDE: LEFT MCP AND PIP JOINT SYNOVIUM: 2ND MCP: Hypertrophy: Mild. Power Doppler: Mild. 2ND PIP: Hypertrophy: None. Power Doppler: None. 3RD MCP: Hypertrophy: None. Power Doppler: None. 3RD PIP: Hypertrophy: None. Power Doppler: None. 4TH MCP: Hypertrophy: None. Power Doppler: None. 4TH PIP: Hypertrophy: None. Power Doppler: None. 5TH MCP: Hypertrophy: None. Power Doppler: None. 5TH PIP: Hypertrophy: None. Power Doppler: None. LEFT EXTENSOR AND FLEXOR TENOSYNOVIUM: 2ND Digit Flexor: Hypertrophy: None. Power Doppler: None. 2ND Digit Extensor: Hypertrophy: None. Power Doppler: None. 3RD Digit Flexor: Hypertrophy: None. Power Doppler: None. 3RD Digit Extensor: Hypertrophy: None. Power Doppler: None. 4TH Digit Flexor: Hypertrophy: None. Power Doppler: None. 4TH Digit Extensor: Hypertrophy: None. Power Doppler: None. 5TH Digit Flexor: Hypertrophy: None. Power Doppler: None. 5TH Digit Extensor: Hypertrophy: None. Power Doppler: None. CARPUS Synovitis: Hypertrophy: None. Power Doppler: None. OTHER: None.    IMPRESSION: MILD ACTIVE SYNOVITIS IN THE RIGHT THIRD AND LEFT SECOND MCP JOINTS, AS WELL AS THE RIGHT ULNOCARPAL JOINT. : SILVIA   Transcribe Date/Time: May 28 2024  8:48A Dictated by : ALBERT WHITTINGTON MD This  examination was interpreted and the report reviewed and electronically signed by: ALBERT WHITTINGTON MD on May 28 2024  9:05AM  EST        Charting was completed using voice recognition technology and may include unintended errors.

## 2024-06-21 ENCOUNTER — TELEPHONE (OUTPATIENT)
Dept: PRIMARY CARE | Facility: CLINIC | Age: 64
End: 2024-06-21
Payer: COMMERCIAL

## 2024-06-21 DIAGNOSIS — Z00.00 HEALTH CARE MAINTENANCE: ICD-10-CM

## 2024-06-21 LAB
NON-UH HIE FERRITIN: 120 NG/ML (ref 22–322)
NON-UH HIE FOLATE: 17 NG/ML (ref 5.4–17.5)
NON-UH HIE HCT: 38.5 % (ref 41–52)
NON-UH HIE HCT: 38.5 % (ref 41–52)
NON-UH HIE HGB: 13 G/DL (ref 13.5–17.5)
NON-UH HIE IMMATURE RETIC FRACTION: 0.43 (ref 0.2–0.5)
NON-UH HIE INSTR WBC ND: 4.8
NON-UH HIE IRON: 104 UG/DL (ref 65–175)
NON-UH HIE MCH: 32.8 PG (ref 27–34)
NON-UH HIE MCHC: 33.7 G/DL (ref 32–37)
NON-UH HIE MCV: 97.2 FL (ref 80–100)
NON-UH HIE MPV: 10.1 FL (ref 7.4–10.4)
NON-UH HIE PLATELET: 176 X10 (ref 150–450)
NON-UH HIE RBC: 3.96 X10 (ref 4.7–6.1)
NON-UH HIE RBC: 3.96 X10 (ref 4.7–6.1)
NON-UH HIE RDW: 12.9 % (ref 11.5–14.5)
NON-UH HIE RETIC ABSOLUTE: 49 X10 (ref 22–110)
NON-UH HIE RETIC CORRECTED: ABNORMAL %
NON-UH HIE RETIC COUNT: 1.2 % (ref 0.5–2.5)
NON-UH HIE SATURATION: 36.5 % (ref 20–50)
NON-UH HIE TIBC: 285 UG/ML (ref 250–425)
NON-UH HIE VITAMIN B12: 380 PG/ML (ref 211–911)
NON-UH HIE WBC: 4.8 X10 (ref 4.5–11)

## 2024-06-21 RX ORDER — METHOCARBAMOL 500 MG/1
500 TABLET, FILM COATED ORAL 2 TIMES DAILY
Qty: 30 TABLET | Refills: 0 | Status: SHIPPED | OUTPATIENT
Start: 2024-06-21

## 2024-06-21 NOTE — TELEPHONE ENCOUNTER
PT was in office on 6/18/24 and states Dr would call something in for pain, but he still hasn't received anything.

## 2024-06-24 ENCOUNTER — TELEPHONE (OUTPATIENT)
Dept: PRIMARY CARE | Facility: CLINIC | Age: 64
End: 2024-06-24
Payer: COMMERCIAL

## 2024-06-24 NOTE — TELEPHONE ENCOUNTER
----- Message from Babak Jeronimo MD sent at 6/24/2024 11:01 AM EDT -----  Anemia advised multivitamin with iron B12 folic acid 1 a day

## 2024-07-09 ENCOUNTER — APPOINTMENT (OUTPATIENT)
Dept: PRIMARY CARE | Facility: CLINIC | Age: 64
End: 2024-07-09
Payer: COMMERCIAL

## 2024-07-27 DIAGNOSIS — Z00.00 HEALTH CARE MAINTENANCE: ICD-10-CM

## 2024-07-27 DIAGNOSIS — K21.9 GASTROESOPHAGEAL REFLUX DISEASE WITHOUT ESOPHAGITIS: ICD-10-CM

## 2024-07-29 RX ORDER — ESOMEPRAZOLE MAGNESIUM 40 MG/1
CAPSULE, DELAYED RELEASE ORAL
Qty: 90 CAPSULE | Refills: 3 | Status: SHIPPED | OUTPATIENT
Start: 2024-07-29

## 2024-07-29 RX ORDER — METHOCARBAMOL 500 MG/1
500 TABLET, FILM COATED ORAL 2 TIMES DAILY
Qty: 30 TABLET | Refills: 0 | Status: SHIPPED | OUTPATIENT
Start: 2024-07-29

## 2024-08-15 DIAGNOSIS — Z00.00 HEALTH CARE MAINTENANCE: ICD-10-CM

## 2024-08-15 RX ORDER — METHOCARBAMOL 500 MG/1
500 TABLET, FILM COATED ORAL 2 TIMES DAILY
Qty: 30 TABLET | Refills: 0 | OUTPATIENT
Start: 2024-08-15

## 2024-08-16 ENCOUNTER — HOSPITAL ENCOUNTER (OUTPATIENT)
Dept: PAIN MEDICINE | Facility: CLINIC | Age: 64
Discharge: HOME | End: 2024-08-16
Payer: MEDICARE

## 2024-08-16 VITALS
HEART RATE: 74 BPM | RESPIRATION RATE: 20 BRPM | DIASTOLIC BLOOD PRESSURE: 71 MMHG | SYSTOLIC BLOOD PRESSURE: 146 MMHG | OXYGEN SATURATION: 96 % | TEMPERATURE: 97.7 F

## 2024-08-16 DIAGNOSIS — M96.1 POSTLAMINECTOMY SYNDROME: ICD-10-CM

## 2024-08-16 PROCEDURE — 62370 ANL SP INF PMP W/MDREPRG&FIL: CPT | Performed by: PAIN MEDICINE

## 2024-08-16 PROCEDURE — 2720000007 HC OR 272 NO HCPCS: Performed by: PAIN MEDICINE

## 2024-08-16 PROCEDURE — C1894 INTRO/SHEATH, NON-LASER: HCPCS | Performed by: PAIN MEDICINE

## 2024-08-16 ASSESSMENT — PAIN - FUNCTIONAL ASSESSMENT: PAIN_FUNCTIONAL_ASSESSMENT: 0-10

## 2024-08-16 ASSESSMENT — PAIN SCALES - GENERAL: PAINLEVEL_OUTOF10: 7

## 2024-08-16 ASSESSMENT — PAIN DESCRIPTION - DESCRIPTORS: DESCRIPTORS: DULL;ACHING

## 2024-08-16 NOTE — DISCHARGE INSTRUCTIONS
INTRATHECAL PAIN PUMP REFILL DISCHARGE INSTRUCTIONS        DO NOT GET INJECTION SITE WET FOR 24 HOURS  IF BANDAGE HAS BEEN PLACED YOU MAY REMOVE AFTER 24 HOURS  MONITOR FOR SIGNS/SYMPTOMS OF INFECTION:FEVERS REDNESS OR INCREASED PAIN AT INJECTION SITE   YOU MAY RESUME YOUR NORMAL ACTIVITY       IF SIGNS OR SYMPTOMS OF OPIATE OVERDOSE ARE NOTED AFTER YOUR REFILL INCLUDING UNUSUAL SLEEPINESS, UNRESPONSIVENESS, SLOW OR ABSENT BREATHING ADMINISTER NARCAN AS DIRECTED AND CALL 911    BEFORE YOU LEAVE OUR OFFICE PLEASE SCHEDULE YOUR NEXT APPOINTMENT TO SCHEDULE YOUR NEXT REFILL APPOINTMENT-IT IS IMPORTANT TO KEEP YOUR APPOINTMENTS SO THAT YOUR PUMP DOES NOT RUN OUT OF MEDICATION AS THIS WILL DAMAGE YOUR PUMP    SHOULD YOU HAVE ANY QUESTIONS OR CONCERNS PLEASE CALL THE OFFICE  [phone]

## 2024-09-09 ENCOUNTER — APPOINTMENT (OUTPATIENT)
Dept: PRIMARY CARE | Facility: CLINIC | Age: 64
End: 2024-09-09
Payer: COMMERCIAL

## 2024-09-09 ENCOUNTER — LAB (OUTPATIENT)
Dept: LAB | Facility: LAB | Age: 64
End: 2024-09-09
Payer: COMMERCIAL

## 2024-09-09 VITALS
WEIGHT: 315 LBS | SYSTOLIC BLOOD PRESSURE: 116 MMHG | BODY MASS INDEX: 44.1 KG/M2 | DIASTOLIC BLOOD PRESSURE: 56 MMHG | TEMPERATURE: 96.8 F | OXYGEN SATURATION: 94 % | HEIGHT: 71 IN | HEART RATE: 69 BPM

## 2024-09-09 DIAGNOSIS — L03.90 CELLULITIS, UNSPECIFIED CELLULITIS SITE: Primary | ICD-10-CM

## 2024-09-09 DIAGNOSIS — D50.0 IRON DEFICIENCY ANEMIA DUE TO CHRONIC BLOOD LOSS: ICD-10-CM

## 2024-09-09 DIAGNOSIS — E03.9 ACQUIRED HYPOTHYROIDISM: ICD-10-CM

## 2024-09-09 DIAGNOSIS — Z23 NEED FOR INFLUENZA VACCINATION: ICD-10-CM

## 2024-09-09 LAB
FERRITIN SERPL-MCNC: 168 NG/ML (ref 20–300)
FOLATE SERPL-MCNC: 13.3 NG/ML
HGB RETIC QN: 36 PG (ref 28–38)
IMMATURE RETIC FRACTION: 11.1 %
IRON SATN MFR SERPL: 24 % (ref 25–45)
IRON SERPL-MCNC: 71 UG/DL (ref 35–150)
RETICS #: 0.07 X10*6/UL (ref 0.02–0.12)
RETICS/RBC NFR AUTO: 1.8 % (ref 0.5–2)
T4 FREE SERPL-MCNC: 1.16 NG/DL (ref 0.78–1.48)
TIBC SERPL-MCNC: 294 UG/DL (ref 240–445)
TSH SERPL-ACNC: 6.4 MIU/L (ref 0.44–3.98)
UIBC SERPL-MCNC: 223 UG/DL (ref 110–370)
VIT B12 SERPL-MCNC: 499 PG/ML (ref 211–911)

## 2024-09-09 PROCEDURE — 1036F TOBACCO NON-USER: CPT | Performed by: INTERNAL MEDICINE

## 2024-09-09 PROCEDURE — 90662 IIV NO PRSV INCREASED AG IM: CPT | Performed by: INTERNAL MEDICINE

## 2024-09-09 PROCEDURE — 82746 ASSAY OF FOLIC ACID SERUM: CPT

## 2024-09-09 PROCEDURE — G2211 COMPLEX E/M VISIT ADD ON: HCPCS | Performed by: INTERNAL MEDICINE

## 2024-09-09 PROCEDURE — 36415 COLL VENOUS BLD VENIPUNCTURE: CPT

## 2024-09-09 PROCEDURE — 83550 IRON BINDING TEST: CPT

## 2024-09-09 PROCEDURE — 82607 VITAMIN B-12: CPT

## 2024-09-09 PROCEDURE — 82728 ASSAY OF FERRITIN: CPT

## 2024-09-09 PROCEDURE — 90471 IMMUNIZATION ADMIN: CPT | Performed by: INTERNAL MEDICINE

## 2024-09-09 PROCEDURE — 84443 ASSAY THYROID STIM HORMONE: CPT

## 2024-09-09 PROCEDURE — 1157F ADVNC CARE PLAN IN RCRD: CPT | Performed by: INTERNAL MEDICINE

## 2024-09-09 PROCEDURE — 1159F MED LIST DOCD IN RCRD: CPT | Performed by: INTERNAL MEDICINE

## 2024-09-09 PROCEDURE — 1160F RVW MEDS BY RX/DR IN RCRD: CPT | Performed by: INTERNAL MEDICINE

## 2024-09-09 PROCEDURE — 85045 AUTOMATED RETICULOCYTE COUNT: CPT

## 2024-09-09 PROCEDURE — 3008F BODY MASS INDEX DOCD: CPT | Performed by: INTERNAL MEDICINE

## 2024-09-09 PROCEDURE — 84439 ASSAY OF FREE THYROXINE: CPT

## 2024-09-09 PROCEDURE — 99214 OFFICE O/P EST MOD 30 MIN: CPT | Performed by: INTERNAL MEDICINE

## 2024-09-09 PROCEDURE — 83540 ASSAY OF IRON: CPT

## 2024-09-09 RX ORDER — DOXYCYCLINE 100 MG/1
100 CAPSULE ORAL 2 TIMES DAILY
Qty: 20 CAPSULE | Refills: 0 | Status: SHIPPED | OUTPATIENT
Start: 2024-09-09 | End: 2024-09-19

## 2024-09-09 RX ORDER — AZATHIOPRINE 50 MG/1
TABLET ORAL
COMMUNITY
Start: 2024-07-22 | End: 2024-10-04

## 2024-09-09 NOTE — ASSESSMENT & PLAN NOTE
Posttraumatic cellulitis of the right foot advised wound care given Doxy 100 twice a day probiotic twice a day monitor for the photosensitivity LFT follow-up

## 2024-09-09 NOTE — PROGRESS NOTES
"Subjective   Patient ID: Ronald Beckham \"Robert" is a 64 y.o. male who presents for Follow-up (3 month ) and Foot Injury (Right foot cut ).    Assessment/Plan     Problem List Items Addressed This Visit       Acquired hypothyroidism     Monitor TSH twice a day keep TSH less than 4         Relevant Orders    TSH with reflex to Free T4 if abnormal    Adult BMI 50.0-59.9 kg/sq m (Multi)     Check sleep apnea endocrine workup refer to dietitian follow-up         Need for influenza vaccination    Relevant Orders    Flu vaccine, trivalent, preservative free, HIGH-DOSE, age 65y+ (Fluzone) (Completed)    Cellulitis - Primary     Posttraumatic cellulitis of the right foot advised wound care given Doxy 100 twice a day probiotic twice a day monitor for the photosensitivity LFT follow-up         Relevant Medications    doxycycline (Vibramycin) 100 mg capsule    Iron deficiency anemia due to chronic blood loss    Relevant Orders    TSH with reflex to Free T4 if abnormal    Iron and TIBC    Vitamin B12    Folate    Ferritin    Reticulocytes   Patient was evaluated today, problem list was reviewed, problems and concerns addressed, Rx list reviewed and updated, lab and tests were noted and reviewed. Life style changes were discussed, always it works better if we eat plant based diet and plenty of fibres and roughage. Consume adequate amount of water and avoid alcohol, light to moderate physical activities and stress reduction are always beneficial for ongoing physical well being. Do not forget to have 6 to 7 hours of sleep regularly and avoid late night pushpa screen exposure.      HPI 64-year-old patient have obesity bipolar hypertension hyperlipidemia hypothyroidism accidental trauma to the right foot posttraumatic increase redness swelling tender drainage cleaned with alcohol peroxide iodine given patient's CBC with differential local wound care given Doxy 100 twice a day probiotic twice a day closely monitor the temperature and " any infections or lymphangitis or radiation of the pain or redness to the right ankle or hip or groin pain    Negative for suicidal    Negative for hypoglycemia hypotension or fall    Negative for fever or chills patient do have iron deficiency anemia advised to take Slow Fe twice a day probiotic twice a day vitamin C problems continue does not get any better advised to evaluated by the gastroenterology    Hyperglycemia and family history positive for diabetes given 1800-calorie 10,000 steps a day follow-up  Past Medical History:   Diagnosis Date    Calculus of gallbladder without cholecystitis without obstruction 04/04/2022    Gallstones    Cellulitis of abdominal wall 09/01/2021    Cellulitis of right abdominal wall    Cellulitis of left external ear 06/13/2022    Cellulitis of left earlobe    Cellulitis of right lower limb 09/09/2020    Cellulitis of right lower extremity    Deficiency of other specified B group vitamins 03/19/2019    B12 deficiency    Dorsalgia, unspecified 09/17/2022    Chronic back pain greater than 3 months duration    Encounter for screening for malignant neoplasm of colon 02/04/2021    Screen for colon cancer    Encounter for screening for malignant neoplasm of rectum 02/04/2021    Screening for rectal cancer    Erythema intertrigo 05/09/2021    Intertrigo    Impaired fasting glucose 09/17/2018    Impaired fasting glucose    Localized edema 08/26/2022    Lower extremity edema    Lumbago with sciatica, right side 11/01/2018    Lumbago with sciatica, right side    Morbid (severe) obesity due to excess calories (Multi) 11/21/2022    Morbid obesity with BMI of 40.0-44.9, adult    Morbid (severe) obesity due to excess calories (Multi) 07/11/2022    Morbid obesity with BMI of 45.0-49.9, adult    Other bursitis of knee, right knee 03/01/2021    Bursitis of right knee    Other conditions influencing health status 11/21/2022    Diabetes type 2, uncontrolled    Other obesity 03/03/2022    Moderate  obesity    Other obesity 11/04/2021    Moderate obesity    Pain in right elbow 08/26/2022    Right elbow pain    Pain in right foot 08/16/2017    Inflammatory pain of right heel    Pain in right hip 07/12/2018    Right hip pain    Pain in right knee 06/15/2021    Acute pain of right knee    Pain in right knee 04/05/2021    Right knee pain    Personal history of diseases of the blood and blood-forming organs and certain disorders involving the immune mechanism 03/19/2019    History of anemia    Personal history of diseases of the skin and subcutaneous tissue 05/09/2021    History of contact dermatitis    Personal history of diseases of the skin and subcutaneous tissue 06/13/2022    History of eczema    Personal history of other diseases of the digestive system 04/27/2022    History of constipation    Personal history of other diseases of the musculoskeletal system and connective tissue 09/16/2019    History of polymyalgia rheumatica    Personal history of other diseases of the musculoskeletal system and connective tissue 03/19/2019    History of arthritis    Personal history of other diseases of the musculoskeletal system and connective tissue 03/01/2021    History of acute gouty arthritis    Personal history of other diseases of the nervous system and sense organs 10/28/2019    History of neuropathy    Personal history of other diseases of the nervous system and sense organs 05/26/2016    History of neuropathy    Personal history of other diseases of the respiratory system 11/21/2022    History of chronic obstructive lung disease    Personal history of other endocrine, nutritional and metabolic disease 03/19/2019    History of vitamin D deficiency    Personal history of other endocrine, nutritional and metabolic disease 09/03/2020    History of obesity    Personal history of other endocrine, nutritional and metabolic disease 06/18/2018    History of hypoglycemia    Personal history of other infectious and parasitic  diseases 11/30/2015    History of viral infection    Personal history of other specified conditions 08/26/2022    History of edema    Personal history of other specified conditions 08/05/2021    History of abdominal pain    Personal history of urinary (tract) infections 12/23/2014    History of urinary tract infection    Proteinuria, unspecified 05/07/2021    Proteinuria    Rash and other nonspecific skin eruption 09/16/2022    Skin rash    Sunburn of first degree 06/18/2018    Sunburn of first degree    Systemic lupus erythematosus, unspecified (Multi) 04/28/2020    History of systemic lupus erythematosus (SLE)    Type 2 diabetes mellitus with other circulatory complications (Multi) 09/17/2022    Hypertension associated with diabetes    Unspecified open wound, left lower leg, sequela 09/13/2021    Leg wound, left, sequela    Unspecified osteoarthritis, unspecified site 03/03/2022    Osteoarthritis     Past Surgical History:   Procedure Laterality Date    BACK SURGERY  03/29/2022    Back Surgery    CHOLECYSTECTOMY      ELBOW SURGERY  12/18/2014    Elbow Surgery    SHOULDER SURGERY  12/18/2014    Shoulder Surgery    TONSILLECTOMY       Allergies   Allergen Reactions    Cat's Claw Shortness of breath and Wheezing     VERIFIED BY ABRAM SEGURA RN    Gabapentin Shortness of breath and Rash    Methotrexate Analogues Shortness of breath    Penicillins Shortness of breath and Other     Unknown reaction    VERIFIED BY ABRAM SEGURA RN    Aripiprazole Swelling and Other     Can't remember reaction    VERIFIED BY ABRAM SEGURA RN    Bacitracin Swelling    Bee Venom Protein (Honey Bee) Unknown    Benzoin Other     Skin peels off -VERIFIED BY ABRAM SEGURA RN    Benzoin Compound Other     Skin peels off     Codeine Unknown    Mjb-Olphisfmt-Lc-Acetaminophen Unknown    Docusate Unknown    Ex-Lax Hives     Blister    Lysolecithin Unknown    Meperidine Hives     Was told he was allergic while in hospital    Meperidine (Pf) Other     "Neomycin Unknown    Neomycin-Bacitracin-Polymyxin Other     \"makes it worse instead of making it better\"    Neosporin (Neomycin-Polymyx) Unknown    Other Hives     SUN     Phenolphthalein Swelling and Hives     VERIFIED BY ABRAM SEGURA RN    Phenylephrine-Acetaminophen Swelling     VERIFIED BY ABRAM SEGURA RN    Polymyxin B Sulf-Trimethoprim Other    Povidone-Iodine Other     VERIFIED BY ABRAM SEGURA RN    Pseudoephedrine-Acetaminophen Other    Senna Other    Sennosides Other    Tizanidine Unknown    Wasp Venom Unknown    Latex, Natural Rubber Hives and Rash    Leflunomide Rash    Lithium Analogues Rash and Other     Uncontrollable body jerking    VERIFIED BY ABRAM SEGURA RN     Current Outpatient Medications   Medication Sig Dispense Refill    azaTHIOprine (Imuran) 50 mg tablet Take by mouth once daily.      DULoxetine (Cymbalta) 60 mg DR capsule Take 1 capsule (60 mg) by mouth 2 times a day. Do not crush or chew.      esomeprazole (NexIUM) 40 mg DR capsule TAKE 1 CAPSULE BY MOUTH EVERY DAY 90 capsule 3    ferrous sulfate 325 (65 Fe) MG EC tablet Take 65 mg by mouth 3 times daily (morning, midday, late afternoon). Do not crush, chew, or split.      hydroxychloroquine (Plaquenil) 200 mg tablet TAKE 1 TABLET BY MOUTH TWICE A  tablet 1    lamoTRIgine (LaMICtal) 200 mg tablet TAKE 1 TABLET BY MOUTH AT BEDTIME DAILY      levothyroxine (Synthroid, Levoxyl) 175 mcg tablet TAKE 1 TABLET BY MOUTH DAILY MONDAY THROUGH SATURDAY AND 1 & 1/2 TABLETS ON SUNDAYS (Patient taking differently: Take 1 tablet (175 mcg) by mouth early in the morning..) 96 tablet 3    meloxicam (Mobic) 15 mg tablet Take 1 tablet (15 mg) by mouth once daily.      methocarbamol (Robaxin) 500 mg tablet TAKE 1 TABLET BY MOUTH TWICE A DAY 30 tablet 0    mycophenolate (Cellcept) 250 mg capsule TAKE 1 CAPSULE (250 MG) BY MOUTH 2 TIMES A DAY. 180 capsule 1    naloxone (Narcan) 4 mg/0.1 mL nasal spray 1 puff when necessary for signs of overdose      NON " FORMULARY Morphine Sulfate      olmesartan (BENIcar) 40 mg tablet TAKE 1 TABLET BY MOUTH EVERY DAY 90 tablet 3    oxybutynin XL (Ditropan-XL) 15 mg 24 hr tablet Take 1 tablet (15 mg) by mouth early in the morning..      pilocarpine (Salagen) 5 mg tablet Take 1 tablet (5 mg) by mouth 3 times a day. 90 tablet 2    tamsulosin (Flomax) 0.4 mg 24 hr capsule Take by mouth.      doxycycline (Vibramycin) 100 mg capsule Take 1 capsule (100 mg) by mouth 2 times a day for 10 days. Take with at least 8 ounces (large glass) of water, do not lie down for 30 minutes after 20 capsule 0     No current facility-administered medications for this visit.     Family History   Problem Relation Name Age of Onset    Diabetes Mother       Social History     Socioeconomic History    Marital status:    Tobacco Use    Smoking status: Former     Current packs/day: 1.50     Average packs/day: 1.5 packs/day for 40.0 years (60.0 ttl pk-yrs)     Types: Cigarettes    Smokeless tobacco: Never   Substance and Sexual Activity    Alcohol use: Never    Drug use: Never     Immunization History   Administered Date(s) Administered    Flu vaccine (IIV4), preservative free *Check age/dose* 10/07/2023    Flu vaccine, trivalent, preservative free, HIGH-DOSE, age 65y+ (Fluzone) 09/09/2024    Hep A / Hep B 01/05/2024, 05/06/2024    Influenza Nasal, Unspecified 11/18/2013    Influenza, Unspecified 10/05/2012, 11/18/2013, 10/25/2016, 08/14/2017, 09/17/2018, 09/16/2019, 09/03/2020, 09/29/2022    Influenza, seasonal, injectable 10/25/2016    MMR vaccine, subcutaneous (MMR II) 05/06/2024    PPD Test 05/28/2012, 05/30/2012    Pfizer COVID-19 vaccine, Fall 2023, 12 years and older, (30mcg/0.3mL) 10/07/2023, 05/06/2024    Pfizer COVID-19 vaccine, bivalent, age 12 years and older (30 mcg/0.3 mL) 09/19/2022    Pfizer Gray Cap SARS-CoV-2 04/06/2022    Pfizer Purple Cap SARS-CoV-2 03/10/2021, 03/31/2021, 10/15/2021, 09/19/2022    Pneumococcal conjugate vaccine,  "20-valent (PREVNAR 20) 04/17/2023    Pneumococcal polysaccharide vaccine, 23-valent, age 2 years and older (PNEUMOVAX 23) 10/28/2019    RESPIRATORY SYNCYTIAL VIRUS (RSV), ELIGIBLE PREGNANT PTS, 0.5 ML (ABRYSVO) 01/05/2024    SARS-CoV-2, Unspecified 04/06/2022    Td vaccine, age 7 years and older (TENIVAC) 05/06/2024    Tdap vaccine, age 7 year and older (BOOSTRIX, ADACEL) 03/15/2018    Zoster vaccine, recombinant, adult (SHINGRIX) 05/01/2018, 08/15/2018, 05/28/2020    Zoster, Unspecified 08/15/2018       Review of Systems  Review of systems is otherwise negative unless stated above or in history of present illness.    Objective   Visit Vitals  /56   Pulse 69   Temp 36 °C (96.8 °F)   Ht 1.803 m (5' 11\")   Wt (!) 164 kg (362 lb)   SpO2 94%   BMI 50.49 kg/m²   Smoking Status Former   BSA 2.87 m²     Physical Exam  Constitutional: BMI 50     General: not in acute distress.   HENT:      Head: Normocephalic and atraumatic.      Nose: Nose normal.   Eyes: No jaundice     Extraocular Movements: Extraocular movements intact.      Conjunctiva/sclera: Conjunctivae normal.   Cardiovascular: S4     Rate and Rhythm: Normal rate ,  No M/R/G  Pulmonary:      Effort: Pulmonary effort is normal.      Breath sounds: Normal, Bilat Equal AE  Skin: Increased redness swelling tenderness right foot mild to moderate cellulitis without lymphangitis     General: Skin is warm.   Neurological: Neuropathy     Mental Status: He is alert and oriented to person, place, and time.   Psychiatric:   Not suicidal   Mood and Affect: Mood normal.         Behavior: Behavior normal.   Musculoskeletal arthritis in remission  FROM in all extremitirs,  Joint-no swelling or tenderness    Orders Only on 06/21/2024   Component Date Value Ref Range Status    [facility] HCT 06/21/2024 38.5 (L)  41.0 - 52.0 % Final    [facility] Immature Retic Fraction 06/21/2024 0.43  0.20 - 0.50 Final    [facility] Retic Count 06/21/2024 1.2  0.5 - 2.5 % Final    [facility] " RBC 06/21/2024 3.96 (L)  4.70 - 6.10 x10 Final    [facility] Retic Absolute 06/21/2024 49  22 - 110 x10 Final    [facility] Retic Corrected 06/21/2024 NA  % Final    [facility] RBC 06/21/2024 3.96 (L)  4.70 - 6.10 x10 Final    [facility] Platelet 06/21/2024 176  150 - 450 x10 Final    [facility] Instr WBC ND 06/21/2024 4.8   Final    [facility] MCHC 06/21/2024 33.7  32.0 - 37.0 g/dL Final    [facility] MCV 06/21/2024 97.2  80.0 - 100.0 fL Final    [facility] HGB 06/21/2024 13.0 (L)  13.5 - 17.5 g/dL Final    [facility] MPV 06/21/2024 10.1  7.4 - 10.4 fL Final    [facility] WBC 06/21/2024 4.8  4.5 - 11.0 x10 Final    [facility] RDW 06/21/2024 12.9  11.5 - 14.5 % Final    [facility] MCH 06/21/2024 32.8  27.0 - 34.0 pg Final    [facility] HCT 06/21/2024 38.5 (L)  41.0 - 52.0 % Final    [facility] TIBC 06/21/2024 285  250 - 425 ug/ml Final    [facility] IRON 06/21/2024 104  65 - 175 ug/dl Final    [facility] Saturation 06/21/2024 36.5  20.0 - 50.0 % Final    [facility] FERRITIN 06/21/2024 120  22 - 322 ng/mL Final    [facility] Vitamin B12 06/21/2024 380  211 - 911 pg/mL Final    [facility] FOLATE 06/21/2024 17.0  5.4 - 17.5 ng/mL Final   Orders Only on 06/18/2024   Component Date Value Ref Range Status    Hemoglobin A1C External 04/20/2024 5.8  % Final       Radiology: Reviewed imaging in powerchart.  CT lung screening low dose    Result Date: 8/19/2024  CT OF THE CHEST WITHOUT IV CONTRAST - LUNG CANCER SCREENING CLINICAL INDICATIONS:  Smoking history of 25-30 years at 1.5-2 packs per day. Patient quit smoking 15 years ago. COMPARISON:  Low-dose CT scan of the chest 8/17/2023. TECHNIQUE: 1mm axial sections through the chest with axial and coronal MIP reconstruction imaging. Automatic exposure control was used. FINDINGS: Lung Nodules: Right lung:  Stable 2 mm subpleural nodular density in the anterior subpleural right middle lobe, image 56, series #6. Stable 3 mm groundglass nodule in the anterior right middle lobe,  image 51. Left lung:  Stable 3 mm nodule in the posterior left upper lobe, image 34, series #6. Lung Findings: Right lung: No acute process. Left lung:  No acute process. Pleura:  Unremarkable. Heart/Mediastinum:  Possible soft tissue lesion measuring 1.4 cm along the posterior left focal cord, image 2, series #4. Coronary artery calcification. Other:  No gross abnormality on limited imaging of the upper abdomen. IMPRESSION: 1.  LungRads category 2. Recommendation: Continue low-dose annual screening. 2. Possible 1.4 cm soft tissue lesion along the left vocal cord. Recommendation: Further evaluation with CT soft tissues of the neck with IV contrast. ____________________ This report was created using voice recognition software and/or free text entry.  I have reviewed this report but may have inadvertently missed correcting erroneous or unusual sound-a-like words.  Electronically signed by:  Roebrt Camacho MD  08/19/2024 08:11 AM EDT  Workstation: [id number] Technologist:  SARAH Dictated By:   ROBERT CAMACHO MD Signed By:     ROBERT CAMACHO MD Signed Out:    08/19/24 08:11:14        Charting was completed using voice recognition technology and may include unintended errors.

## 2024-09-10 ENCOUNTER — TELEPHONE (OUTPATIENT)
Dept: PRIMARY CARE | Facility: CLINIC | Age: 64
End: 2024-09-10
Payer: COMMERCIAL

## 2024-09-10 DIAGNOSIS — E03.9 HYPOTHYROIDISM, UNSPECIFIED: ICD-10-CM

## 2024-09-10 RX ORDER — LEVOTHYROXINE SODIUM 200 UG/1
TABLET ORAL
Qty: 12 TABLET | Refills: 1 | Status: SHIPPED | OUTPATIENT
Start: 2024-09-10

## 2024-09-10 NOTE — TELEPHONE ENCOUNTER
----- Message from Babak Jeronimo sent at 9/10/2024  9:19 AM EDT -----  Iron saturation 24 Slow Fe 1 a day    TSH 6.4 advised increase levothyroxine 188 mcg a day #30 TSH 4 weeks follow-up 4 weeks

## 2024-10-12 DIAGNOSIS — Z00.00 HEALTH CARE MAINTENANCE: ICD-10-CM

## 2024-10-12 DIAGNOSIS — I10 HYPERTENSION, UNSPECIFIED TYPE: ICD-10-CM

## 2024-10-14 DIAGNOSIS — E03.9 HYPOTHYROIDISM, UNSPECIFIED: ICD-10-CM

## 2024-10-14 RX ORDER — OLMESARTAN MEDOXOMIL 40 MG/1
40 TABLET ORAL DAILY
Qty: 90 TABLET | Refills: 3 | Status: SHIPPED | OUTPATIENT
Start: 2024-10-14

## 2024-10-14 RX ORDER — METHOCARBAMOL 500 MG/1
500 TABLET, FILM COATED ORAL 2 TIMES DAILY
Qty: 30 TABLET | Refills: 0 | Status: SHIPPED | OUTPATIENT
Start: 2024-10-14

## 2024-10-15 RX ORDER — LEVOTHYROXINE SODIUM 200 UG/1
TABLET ORAL
Qty: 12 TABLET | Refills: 1 | Status: SHIPPED | OUTPATIENT
Start: 2024-10-15

## 2024-11-15 DIAGNOSIS — Z00.00 HEALTH CARE MAINTENANCE: ICD-10-CM

## 2024-11-18 ENCOUNTER — APPOINTMENT (OUTPATIENT)
Dept: PAIN MEDICINE | Facility: CLINIC | Age: 64
End: 2024-11-18
Payer: COMMERCIAL

## 2024-11-18 VITALS
RESPIRATION RATE: 20 BRPM | DIASTOLIC BLOOD PRESSURE: 103 MMHG | HEART RATE: 62 BPM | OXYGEN SATURATION: 95 % | SYSTOLIC BLOOD PRESSURE: 154 MMHG | TEMPERATURE: 96.9 F

## 2024-11-18 DIAGNOSIS — M96.1 POSTLAMINECTOMY SYNDROME: ICD-10-CM

## 2024-11-18 DIAGNOSIS — E03.9 HYPOTHYROIDISM, UNSPECIFIED: ICD-10-CM

## 2024-11-18 PROCEDURE — C1894 INTRO/SHEATH, NON-LASER: HCPCS

## 2024-11-18 PROCEDURE — 62370 ANL SP INF PMP W/MDREPRG&FIL: CPT | Performed by: PAIN MEDICINE

## 2024-11-18 PROCEDURE — 2720000007 HC OR 272 NO HCPCS

## 2024-11-18 RX ORDER — LEVOTHYROXINE SODIUM 200 UG/1
TABLET ORAL
Qty: 12 TABLET | Refills: 3 | Status: SHIPPED | OUTPATIENT
Start: 2024-11-18

## 2024-11-18 RX ORDER — METHOCARBAMOL 500 MG/1
500 TABLET, FILM COATED ORAL 2 TIMES DAILY
Qty: 30 TABLET | Refills: 0 | Status: SHIPPED | OUTPATIENT
Start: 2024-11-18

## 2024-11-18 ASSESSMENT — PAIN - FUNCTIONAL ASSESSMENT: PAIN_FUNCTIONAL_ASSESSMENT: 0-10

## 2024-11-18 ASSESSMENT — PAIN SCALES - GENERAL: PAINLEVEL_OUTOF10: 8

## 2024-11-18 ASSESSMENT — PAIN DESCRIPTION - DESCRIPTORS: DESCRIPTORS: ACHING

## 2024-11-18 NOTE — DISCHARGE INSTRUCTIONS
INTRATHECAL PAIN PUMP REFILL DISCHARGE INSTRUCTIONS        DO NOT GET INJECTION SITE WET FOR 24 HOURS  IF BANDAGE HAS BEEN PLACED YOU MAY REMOVE AFTER 24 HOURS  MONITOR FOR SIGNS/SYMPTOMS OF INFECTION:FEVERS REDNESS OR INCREASED PAIN AT INJECTION SITE   YOU MAY RESUME YOUR NORMAL ACTIVITY       IF SIGNS OR SYMPTOMS OF OPIATE OVERDOSE ARE NOTED AFTER YOUR REFILL INCLUDING UNUSUAL SLEEPINESS, UNRESPONSIVENESS, SLOW OR ABSENT BREATHING ADMINISTER NARCAN AS DIRECTED AND CALL 911    BEFORE YOU LEAVE OUR OFFICE PLEASE SCHEDULE YOUR NEXT APPOINTMENT TO SCHEDULE YOUR NEXT REFILL APPOINTMENT-IT IS IMPORTANT TO KEEP YOUR APPOINTMENTS SO THAT YOUR PUMP DOES NOT RUN OUT OF MEDICATION AS THIS WILL DAMAGE YOUR PUMP    SHOULD YOU HAVE ANY QUESTIONS OR CONCERNS PLEASE CALL THE OFFICE  [phone]

## 2024-12-10 ENCOUNTER — APPOINTMENT (OUTPATIENT)
Dept: PRIMARY CARE | Facility: CLINIC | Age: 64
End: 2024-12-10
Payer: COMMERCIAL

## 2024-12-10 ENCOUNTER — LAB (OUTPATIENT)
Dept: LAB | Facility: LAB | Age: 64
End: 2024-12-10
Payer: COMMERCIAL

## 2024-12-10 VITALS
TEMPERATURE: 97.5 F | HEIGHT: 71 IN | OXYGEN SATURATION: 98 % | WEIGHT: 315 LBS | HEART RATE: 76 BPM | DIASTOLIC BLOOD PRESSURE: 57 MMHG | BODY MASS INDEX: 44.1 KG/M2 | SYSTOLIC BLOOD PRESSURE: 126 MMHG

## 2024-12-10 DIAGNOSIS — D50.0 IRON DEFICIENCY ANEMIA DUE TO CHRONIC BLOOD LOSS: ICD-10-CM

## 2024-12-10 DIAGNOSIS — E03.9 ACQUIRED HYPOTHYROIDISM: Primary | ICD-10-CM

## 2024-12-10 DIAGNOSIS — N40.0 BENIGN PROSTATIC HYPERPLASIA WITHOUT LOWER URINARY TRACT SYMPTOMS: ICD-10-CM

## 2024-12-10 DIAGNOSIS — E03.9 ACQUIRED HYPOTHYROIDISM: ICD-10-CM

## 2024-12-10 DIAGNOSIS — M35.00 SJOGREN'S SYNDROME, WITH UNSPECIFIED ORGAN INVOLVEMENT (MULTI): ICD-10-CM

## 2024-12-10 DIAGNOSIS — F31.31 BIPOLAR AFFECTIVE DISORDER, CURRENTLY DEPRESSED, MILD (MULTI): ICD-10-CM

## 2024-12-10 PROBLEM — Z23 NEED FOR INFLUENZA VACCINATION: Status: RESOLVED | Noted: 2023-04-17 | Resolved: 2024-12-10

## 2024-12-10 PROBLEM — L03.90 CELLULITIS: Status: RESOLVED | Noted: 2024-09-09 | Resolved: 2024-12-10

## 2024-12-10 LAB
ALBUMIN SERPL BCP-MCNC: 4 G/DL (ref 3.4–5)
ALP SERPL-CCNC: 66 U/L (ref 33–136)
ALT SERPL W P-5'-P-CCNC: 12 U/L (ref 10–52)
ANION GAP SERPL CALC-SCNC: 12 MMOL/L (ref 10–20)
AST SERPL W P-5'-P-CCNC: 16 U/L (ref 9–39)
BILIRUB SERPL-MCNC: 0.5 MG/DL (ref 0–1.2)
BUN SERPL-MCNC: 13 MG/DL (ref 6–23)
CALCIUM SERPL-MCNC: 8.8 MG/DL (ref 8.6–10.6)
CHLORIDE SERPL-SCNC: 104 MMOL/L (ref 98–107)
CHOLEST SERPL-MCNC: 175 MG/DL (ref 0–199)
CHOLESTEROL/HDL RATIO: 3.9
CO2 SERPL-SCNC: 27 MMOL/L (ref 21–32)
CREAT SERPL-MCNC: 0.81 MG/DL (ref 0.5–1.3)
EGFRCR SERPLBLD CKD-EPI 2021: >90 ML/MIN/1.73M*2
EST. AVERAGE GLUCOSE BLD GHB EST-MCNC: 117 MG/DL
GLUCOSE SERPL-MCNC: 136 MG/DL (ref 74–99)
HBA1C MFR BLD: 5.7 %
HDLC SERPL-MCNC: 45.2 MG/DL
LDLC SERPL CALC-MCNC: 108 MG/DL
NON HDL CHOLESTEROL: 130 MG/DL (ref 0–149)
POTASSIUM SERPL-SCNC: 4.8 MMOL/L (ref 3.5–5.3)
PROT SERPL-MCNC: 6.8 G/DL (ref 6.4–8.2)
SODIUM SERPL-SCNC: 138 MMOL/L (ref 136–145)
TRIGL SERPL-MCNC: 111 MG/DL (ref 0–149)
TSH SERPL-ACNC: 5.78 MIU/L (ref 0.44–3.98)
VLDL: 22 MG/DL (ref 0–40)

## 2024-12-10 PROCEDURE — G2211 COMPLEX E/M VISIT ADD ON: HCPCS | Performed by: INTERNAL MEDICINE

## 2024-12-10 PROCEDURE — 83036 HEMOGLOBIN GLYCOSYLATED A1C: CPT

## 2024-12-10 PROCEDURE — 1036F TOBACCO NON-USER: CPT | Performed by: INTERNAL MEDICINE

## 2024-12-10 PROCEDURE — 80053 COMPREHEN METABOLIC PANEL: CPT

## 2024-12-10 PROCEDURE — 3008F BODY MASS INDEX DOCD: CPT | Performed by: INTERNAL MEDICINE

## 2024-12-10 PROCEDURE — 84443 ASSAY THYROID STIM HORMONE: CPT

## 2024-12-10 PROCEDURE — 99214 OFFICE O/P EST MOD 30 MIN: CPT | Performed by: INTERNAL MEDICINE

## 2024-12-10 PROCEDURE — 80061 LIPID PANEL: CPT

## 2024-12-10 PROCEDURE — 84439 ASSAY OF FREE THYROXINE: CPT

## 2024-12-10 PROCEDURE — 36415 COLL VENOUS BLD VENIPUNCTURE: CPT

## 2024-12-10 PROCEDURE — 1159F MED LIST DOCD IN RCRD: CPT | Performed by: INTERNAL MEDICINE

## 2024-12-10 PROCEDURE — 1157F ADVNC CARE PLAN IN RCRD: CPT | Performed by: INTERNAL MEDICINE

## 2024-12-10 RX ORDER — AZATHIOPRINE 50 MG/1
150 TABLET ORAL DAILY
COMMUNITY

## 2024-12-10 ASSESSMENT — PATIENT HEALTH QUESTIONNAIRE - PHQ9
SUM OF ALL RESPONSES TO PHQ9 QUESTIONS 1 AND 2: 1
1. LITTLE INTEREST OR PLEASURE IN DOING THINGS: NOT AT ALL
10. IF YOU CHECKED OFF ANY PROBLEMS, HOW DIFFICULT HAVE THESE PROBLEMS MADE IT FOR YOU TO DO YOUR WORK, TAKE CARE OF THINGS AT HOME, OR GET ALONG WITH OTHER PEOPLE: VERY DIFFICULT
2. FEELING DOWN, DEPRESSED OR HOPELESS: SEVERAL DAYS

## 2024-12-10 NOTE — PROGRESS NOTES
"Subjective   Patient ID: Ronald Beckham \"Nicholas\" is a 64 y.o. male who presents for Follow-up (3 month /Discuss medications is there anything he can stop taking ).    Assessment/Plan     Problem List Items Addressed This Visit       Acquired hypothyroidism - Primary     Continue levothyroxine monitor TSH twice a year         Relevant Orders    Referral to Ophthalmology    Hemoglobin A1C    Lipid Panel    Comprehensive Metabolic Panel    TSH with reflex to Free T4 if abnormal    Bipolar affective disorder, currently depressed, mild (Multi)    Relevant Orders    Referral to Ophthalmology    Hemoglobin A1C    Lipid Panel    Comprehensive Metabolic Panel    TSH with reflex to Free T4 if abnormal    BPH (benign prostatic hyperplasia)     Urinalysis and PSA         Adult BMI 50.0-59.9 kg/sq m (Multi)     To obesity clinic for possible surgery         Sjogren's disease     Rheumatology to follow-up         Relevant Orders    Referral to Ophthalmology    Hemoglobin A1C    Lipid Panel    Comprehensive Metabolic Panel    TSH with reflex to Free T4 if abnormal    Iron deficiency anemia due to chronic blood loss     Refer to GI for EGD colonoscopy continue multivitamin iron and vitamin C supplement         Relevant Orders    Referral to Ophthalmology    Hemoglobin A1C    Lipid Panel    Comprehensive Metabolic Panel    TSH with reflex to Free T4 if abnormal     Patient was evaluated today, problem list was reviewed, problems and concerns addressed, Rx list reviewed and updated, lab and tests were noted and reviewed. Life style changes were discussed, always it works better if we eat plant based diet and plenty of fibres and roughage. Consume adequate amount of water and avoid alcohol, light to moderate physical activities and stress reduction are always beneficial for ongoing physical well being. Do not forget to have 6 to 7 hours of sleep regularly and avoid late night pushpa screen exposure.    HPI 64-year-old patient have a " mental health problem autoimmune arthritis jugular disease hypertension hyperlipidemia proteinuria sleep apnea obesity advised dietitian evaluation obesity clinic evaluation send recently seen by the psych getting the Robaxin interaction with other medication advised to discontinue Robaxin    Sent for the CBC CMP TSH follow-up iron deficiency anemia advised take vitamin C with iron supplement refer to GI for EGD colonoscopy to rule out cause of the anemia    Negative for follow suicide    Negative for jaundice or night sweat or hematuria rectal bleeding.  Past Medical History:   Diagnosis Date    Calculus of gallbladder without cholecystitis without obstruction 04/04/2022    Gallstones    Cellulitis of abdominal wall 09/01/2021    Cellulitis of right abdominal wall    Cellulitis of left external ear 06/13/2022    Cellulitis of left earlobe    Cellulitis of right lower limb 09/09/2020    Cellulitis of right lower extremity    Deficiency of other specified B group vitamins 03/19/2019    B12 deficiency    Dorsalgia, unspecified 09/17/2022    Chronic back pain greater than 3 months duration    Encounter for screening for malignant neoplasm of colon 02/04/2021    Screen for colon cancer    Encounter for screening for malignant neoplasm of rectum 02/04/2021    Screening for rectal cancer    Erythema intertrigo 05/09/2021    Intertrigo    Impaired fasting glucose 09/17/2018    Impaired fasting glucose    Localized edema 08/26/2022    Lower extremity edema    Lumbago with sciatica, right side 11/01/2018    Lumbago with sciatica, right side    Morbid (severe) obesity due to excess calories (Multi) 11/21/2022    Morbid obesity with BMI of 40.0-44.9, adult    Morbid (severe) obesity due to excess calories (Multi) 07/11/2022    Morbid obesity with BMI of 45.0-49.9, adult    Other bursitis of knee, right knee 03/01/2021    Bursitis of right knee    Other conditions influencing health status 11/21/2022    Diabetes type 2,  uncontrolled    Other obesity 03/03/2022    Moderate obesity    Other obesity 11/04/2021    Moderate obesity    Pain in right elbow 08/26/2022    Right elbow pain    Pain in right foot 08/16/2017    Inflammatory pain of right heel    Pain in right hip 07/12/2018    Right hip pain    Pain in right knee 06/15/2021    Acute pain of right knee    Pain in right knee 04/05/2021    Right knee pain    Personal history of diseases of the blood and blood-forming organs and certain disorders involving the immune mechanism 03/19/2019    History of anemia    Personal history of diseases of the skin and subcutaneous tissue 05/09/2021    History of contact dermatitis    Personal history of diseases of the skin and subcutaneous tissue 06/13/2022    History of eczema    Personal history of other diseases of the digestive system 04/27/2022    History of constipation    Personal history of other diseases of the musculoskeletal system and connective tissue 09/16/2019    History of polymyalgia rheumatica    Personal history of other diseases of the musculoskeletal system and connective tissue 03/19/2019    History of arthritis    Personal history of other diseases of the musculoskeletal system and connective tissue 03/01/2021    History of acute gouty arthritis    Personal history of other diseases of the nervous system and sense organs 10/28/2019    History of neuropathy    Personal history of other diseases of the nervous system and sense organs 05/26/2016    History of neuropathy    Personal history of other diseases of the respiratory system 11/21/2022    History of chronic obstructive lung disease    Personal history of other endocrine, nutritional and metabolic disease 03/19/2019    History of vitamin D deficiency    Personal history of other endocrine, nutritional and metabolic disease 09/03/2020    History of obesity    Personal history of other endocrine, nutritional and metabolic disease 06/18/2018    History of hypoglycemia     Personal history of other infectious and parasitic diseases 11/30/2015    History of viral infection    Personal history of other specified conditions 08/26/2022    History of edema    Personal history of other specified conditions 08/05/2021    History of abdominal pain    Personal history of urinary (tract) infections 12/23/2014    History of urinary tract infection    Proteinuria, unspecified 05/07/2021    Proteinuria    Rash and other nonspecific skin eruption 09/16/2022    Skin rash    Sunburn of first degree 06/18/2018    Sunburn of first degree    Systemic lupus erythematosus, unspecified 04/28/2020    History of systemic lupus erythematosus (SLE)    Type 2 diabetes mellitus with other circulatory complications 09/17/2022    Hypertension associated with diabetes    Unspecified open wound, left lower leg, sequela 09/13/2021    Leg wound, left, sequela    Unspecified osteoarthritis, unspecified site 03/03/2022    Osteoarthritis     Past Surgical History:   Procedure Laterality Date    BACK SURGERY  03/29/2022    Back Surgery    CHOLECYSTECTOMY      ELBOW SURGERY  12/18/2014    Elbow Surgery    SHOULDER SURGERY  12/18/2014    Shoulder Surgery    TONSILLECTOMY       Allergies   Allergen Reactions    Cat's Claw Shortness of breath and Wheezing     VERIFIED BY ABRAM SEGURA RN    Gabapentin Shortness of breath and Rash    Methotrexate Analogues Shortness of breath    Penicillins Shortness of breath and Other     Unknown reaction    VERIFIED BY ABRAM SEGURA RN    Aripiprazole Swelling and Other     Can't remember reaction    VERIFIED BY ABRAM SEGURA RN    Bacitracin Swelling    Bee Venom Protein (Honey Bee) Unknown    Benzoin Other     Skin peels off -VERIFIED BY ABRAM SEGURA RN    Benzoin Compound Other     Skin peels off     Codeine Unknown    Cpl-Rwtdmicyk-Bj-Acetaminophen Unknown    Docusate Unknown    Ex-Lax Hives     Blister    Lysolecithin Unknown    Meperidine Hives     Was told he was allergic while in  "hospital    Meperidine (Pf) Other    Neomycin Unknown    Neomycin-Bacitracin-Polymyxin Other     \"makes it worse instead of making it better\"    Neosporin (Neomycin-Polymyx) Unknown    Other Hives     SUN     Phenolphthalein Swelling and Hives     VERIFIED BY ABRAM SEGURA RN    Phenylephrine-Acetaminophen Swelling     VERIFIED BY ABRAM SEGURA RN    Polymyxin B Sulf-Trimethoprim Other    Povidone-Iodine Other     VERIFIED BY ABRAM SEGURA RN    Pseudoephedrine-Acetaminophen Other    Senna Other    Sennosides Other    Tizanidine Unknown    Wasp Venom Unknown    Latex, Natural Rubber Hives and Rash    Leflunomide Rash    Lithium Analogues Rash and Other     Uncontrollable body jerking    VERIFIED BY ABRAM SEGURA RN     Current Outpatient Medications   Medication Sig Dispense Refill    DULoxetine (Cymbalta) 60 mg DR capsule Take 1 capsule (60 mg) by mouth 2 times a day. Do not crush or chew.      esomeprazole (NexIUM) 40 mg DR capsule TAKE 1 CAPSULE BY MOUTH EVERY DAY 90 capsule 3    hydroxychloroquine (Plaquenil) 200 mg tablet TAKE 1 TABLET BY MOUTH TWICE A  tablet 1    lamoTRIgine (LaMICtal) 200 mg tablet TAKE 1 TABLET BY MOUTH AT BEDTIME DAILY      levothyroxine (Synthroid, Levoxyl) 175 mcg tablet TAKE 1 TABLET BY MOUTH DAILY MONDAY THROUGH SATURDAY AND 1 & 1/2 TABLETS ON SUNDAYS (Patient taking differently: Take 1 tablet (175 mcg) by mouth early in the morning..) 96 tablet 3    levothyroxine (Synthroid, Levoxyl) 200 mcg tablet TAKE 1 TAB BY MOUTH ON SATURDAY & SUNDAY. TAKE ON AN EMPTY STOMACH AT THE SAME TIME EACH DAY, EITHER 30 TO 60 MINUTES PRIOR TO BREAKFAST 12 tablet 3    meloxicam (Mobic) 15 mg tablet Take 1 tablet (15 mg) by mouth once daily.      mycophenolate (Cellcept) 250 mg capsule TAKE 1 CAPSULE (250 MG) BY MOUTH 2 TIMES A DAY. 180 capsule 1    naloxone (Narcan) 4 mg/0.1 mL nasal spray 1 puff when necessary for signs of overdose      NON FORMULARY Morphine Sulfate      olmesartan (BENIcar) 40 mg " tablet TAKE 1 TABLET BY MOUTH EVERY DAY 90 tablet 3    oxybutynin XL (Ditropan-XL) 15 mg 24 hr tablet Take 1 tablet (15 mg) by mouth early in the morning..      pilocarpine (Salagen) 5 mg tablet Take 1 tablet (5 mg) by mouth 3 times a day. 90 tablet 2    tamsulosin (Flomax) 0.4 mg 24 hr capsule Take by mouth.       No current facility-administered medications for this visit.     Family History   Problem Relation Name Age of Onset    Diabetes Mother       Social History     Socioeconomic History    Marital status:    Tobacco Use    Smoking status: Former     Current packs/day: 1.50     Average packs/day: 1.5 packs/day for 40.0 years (60.0 ttl pk-yrs)     Types: Cigarettes    Smokeless tobacco: Never   Substance and Sexual Activity    Alcohol use: Never    Drug use: Never     Immunization History   Administered Date(s) Administered    Flu vaccine (IIV4), preservative free *Check age/dose* 10/07/2023    Flu vaccine, trivalent, preservative free, HIGH-DOSE, age 65y+ (Fluzone) 09/09/2024    Hep A / Hep B 01/05/2024, 05/06/2024    Influenza Nasal, Unspecified 11/18/2013    Influenza, Unspecified 10/05/2012, 11/18/2013, 10/25/2016, 08/14/2017, 09/17/2018, 09/16/2019, 09/03/2020, 09/29/2022    Influenza, seasonal, injectable 10/25/2016    MMR vaccine, subcutaneous (MMR II) 05/06/2024    PPD Test 05/28/2012, 05/30/2012    Pfizer COVID-19 vaccine, 12 years and older, (30mcg/0.3mL) (Comirnaty) 10/07/2023, 05/06/2024    Pfizer COVID-19 vaccine, bivalent, age 12 years and older (30 mcg/0.3 mL) 09/19/2022    Pfizer Gray Cap SARS-CoV-2 04/06/2022    Pfizer Purple Cap SARS-CoV-2 03/10/2021, 03/31/2021, 10/15/2021, 09/19/2022    Pneumococcal conjugate vaccine, 20-valent (PREVNAR 20) 04/17/2023    Pneumococcal polysaccharide vaccine, 23-valent, age 2 years and older (PNEUMOVAX 23) 10/28/2019    RESPIRATORY SYNCYTIAL VIRUS (RSV), ELIGIBLE PREGNANT PTS, 0.5 ML (ABRYSVO) 01/05/2024    SARS-CoV-2, Unspecified 04/06/2022    Td  "vaccine, age 7 years and older (TENIVAC) 05/06/2024    Tdap vaccine, age 7 year and older (BOOSTRIX, ADACEL) 03/15/2018    Zoster vaccine, recombinant, adult (SHINGRIX) 05/01/2018, 08/15/2018, 05/28/2020    Zoster, Unspecified 08/15/2018       Review of Systems  Review of systems is otherwise negative unless stated above or in history of present illness.    Objective   Visit Vitals  /57   Pulse 76   Temp 36.4 °C (97.5 °F)   Ht 1.803 m (5' 11\")   Wt (!) 163 kg (359 lb)   SpO2 98%   BMI 50.07 kg/m²   Smoking Status Former   BSA 2.86 m²     Physical Exam  Constitutional: BMI 50 runny nose     General: not in acute distress.   HENT:      Head: Normocephalic and atraumatic.      Nose: Nose normal.   Eyes:      Extraocular Movements: Extraocular movements intact.      Conjunctiva/sclera: Conjunctivae normal.   Cardiovascular: Heart murmur     Rate and Rhythm: Normal rate ,  No M/R/G  Pulmonary: Rhonchi     Effort: Pulmonary effort is normal.      Breath sounds: Normal, Bilat Equal AE  Skin: Eczema     General: Skin is warm.   Neurological:      Mental Status: He is alert and oriented to person, place, and time.   Psychiatric:    Anxiety depression without suicide     Mood and Affect: Mood normal.         Behavior: Behavior normal.   Musculoskeletal advanced arthritis of the hip and spine  FROM in all extremitirs,  Joint-no swelling or tenderness    Lab on 09/09/2024   Component Date Value Ref Range Status    Thyroid Stimulating Hormone 09/09/2024 6.40 (H)  0.44 - 3.98 mIU/L Final    Iron 09/09/2024 71  35 - 150 ug/dL Final    UIBC 09/09/2024 223  110 - 370 ug/dL Final    TIBC 09/09/2024 294  240 - 445 ug/dL Final    % Saturation 09/09/2024 24 (L)  25 - 45 % Final    Vitamin B12 09/09/2024 499  211 - 911 pg/mL Final    Folate, Serum 09/09/2024 13.3  >5.0 ng/mL Final    Ferritin 09/09/2024 168  20 - 300 ng/mL Final    Retic % 09/09/2024 1.8  0.5 - 2.0 % Final    Retic Absolute 09/09/2024 0.067  0.022 - 0.118 x10*6/uL " Final    Reticulocyte Hemoglobin 09/09/2024 36  28 - 38 pg Final    Immature Retic fraction 09/09/2024 11.1  <=16.0 % Final    Thyroxine, Free 09/09/2024 1.16  0.78 - 1.48 ng/dL Final       Radiology: Reviewed imaging in powerchart.  Intrathecal Pump Refill    Result Date: 11/18/2024  Table formatting from the original result was not included. Procedure Intrathecal Pump Refill Indication Postlaminectomy syndrome Medications No administrations occurring from 0756 to 0810 on 11/18/24 Preprocedure A history and physical has been performed, and patient medication allergies have been reviewed. The patient's tolerance of previous anesthesia has been reviewed. The risks and benefits of the procedure and the sedation options and risks were discussed with the patient. All questions were answered and informed consent obtained. Details of the Procedure Operation Intrathecal pump refill.  Surgical Indications The patient is here today to receive an intrathecal pump refill to assist with the pain.  Operative Report The patient was taken to the procedure area, was placed into a supine positioning. The pump was interrogated and showed a refill volume of 7 mL. The pump then was emptied and a new solution of morphine preservative-free at a dose of 5 mg/mL, a total volume of 20 mL was injected into the pump, and the pump was reprogrammed to deliver a dose of 0.719 mg of morphine with PTM 0.1 mg/inj. maximum 2 injections/day per day.  The alarm date was set for 2/24/2025. The patient recovered for sufficient amount of time and then was discharged home in stable condition. Patient was advised to [facility]up with [facility] to refill his pump accordingly.      Procedure Provider Sandra Hernandez MD Procedure Location [address] [place] [address] Referring Provider Sandra Hernandez MD [address] [place] [address]  [facility] [place]  [place] [address]         Charting was completed using voice recognition technology and may include unintended errors.

## 2024-12-11 LAB — T4 FREE SERPL-MCNC: 1.3 NG/DL (ref 0.78–1.48)

## 2024-12-12 ENCOUNTER — TELEPHONE (OUTPATIENT)
Dept: PRIMARY CARE | Facility: CLINIC | Age: 64
End: 2024-12-12
Payer: COMMERCIAL

## 2024-12-12 NOTE — TELEPHONE ENCOUNTER
----- Message from Babak Jeronimo sent at 12/12/2024 10:41 AM EST -----  TSH 5.78 hemoglobin A1c 5.7  glucose 136 thyroid normal    Patient advised to continue levothyroxine 175 mg daily Monday to Friday Saturday and Sunday take 200 mcg a day follow-up

## 2025-02-21 ENCOUNTER — APPOINTMENT (OUTPATIENT)
Dept: PAIN MEDICINE | Facility: CLINIC | Age: 65
End: 2025-02-21
Payer: COMMERCIAL

## 2025-02-21 VITALS
RESPIRATION RATE: 20 BRPM | OXYGEN SATURATION: 96 % | DIASTOLIC BLOOD PRESSURE: 72 MMHG | SYSTOLIC BLOOD PRESSURE: 137 MMHG | HEART RATE: 76 BPM

## 2025-02-21 DIAGNOSIS — M96.1 POSTLAMINECTOMY SYNDROME: ICD-10-CM

## 2025-02-21 PROCEDURE — C1894 INTRO/SHEATH, NON-LASER: HCPCS | Performed by: PAIN MEDICINE

## 2025-02-21 PROCEDURE — 2720000007 HC OR 272 NO HCPCS: Performed by: PAIN MEDICINE

## 2025-02-21 PROCEDURE — 62370 ANL SP INF PMP W/MDREPRG&FIL: CPT | Performed by: PAIN MEDICINE

## 2025-02-21 ASSESSMENT — PAIN SCALES - GENERAL: PAINLEVEL_OUTOF10: 8

## 2025-02-21 ASSESSMENT — PAIN - FUNCTIONAL ASSESSMENT: PAIN_FUNCTIONAL_ASSESSMENT: 0-10

## 2025-02-21 NOTE — DISCHARGE INSTRUCTIONS
INTRATHECAL PAIN PUMP REFILL DISCHARGE INSTRUCTIONS        DO NOT GET INJECTION SITE WET FOR 24 HOURS  IF BANDAGE HAS BEEN PLACED YOU MAY REMOVE AFTER 24 HOURS  MONITOR FOR SIGNS/SYMPTOMS OF INFECTION:FEVERS REDNESS OR INCREASED PAIN AT INJECTION SITE   YOU MAY RESUME YOUR NORMAL ACTIVITY       IF SIGNS OR SYMPTOMS OF OPIATE OVERDOSE ARE NOTED AFTER YOUR REFILL INCLUDING UNUSUAL SLEEPINESS, UNRESPONSIVENESS, SLOW OR ABSENT BREATHING ADMINISTER NARCAN AS DIRECTED AND CALL [phone]    BEFORE YOU LEAVE OUR OFFICE PLEASE SCHEDULE YOUR NEXT APPOINTMENT TO SCHEDULE YOUR NEXT REFILL APPOINTMENT-IT IS IMPORTANT TO KEEP YOUR APPOINTMENTS SO THAT YOUR PUMP DOES NOT RUN OUT OF MEDICATION AS THIS WILL DAMAGE YOUR PUMP    SHOULD YOU HAVE ANY QUESTIONS OR CONCERNS PLEASE CALL THE OFFICE  [phone]

## 2025-03-03 DIAGNOSIS — E03.9 HYPOTHYROIDISM, UNSPECIFIED: ICD-10-CM

## 2025-03-03 RX ORDER — LEVOTHYROXINE SODIUM 200 UG/1
TABLET ORAL
Qty: 90 TABLET | Refills: 3 | Status: SHIPPED | OUTPATIENT
Start: 2025-03-03

## 2025-03-10 ENCOUNTER — APPOINTMENT (OUTPATIENT)
Dept: PRIMARY CARE | Facility: CLINIC | Age: 65
End: 2025-03-10
Payer: COMMERCIAL

## 2025-03-10 VITALS
WEIGHT: 315 LBS | TEMPERATURE: 97 F | HEIGHT: 71 IN | SYSTOLIC BLOOD PRESSURE: 130 MMHG | BODY MASS INDEX: 44.1 KG/M2 | DIASTOLIC BLOOD PRESSURE: 67 MMHG | OXYGEN SATURATION: 97 % | HEART RATE: 92 BPM

## 2025-03-10 DIAGNOSIS — E03.9 HYPOTHYROIDISM, UNSPECIFIED: ICD-10-CM

## 2025-03-10 DIAGNOSIS — M35.00 SJOGREN'S SYNDROME, WITH UNSPECIFIED ORGAN INVOLVEMENT (MULTI): ICD-10-CM

## 2025-03-10 DIAGNOSIS — F31.31 BIPOLAR AFFECTIVE DISORDER, CURRENTLY DEPRESSED, MILD (MULTI): ICD-10-CM

## 2025-03-10 DIAGNOSIS — N40.0 BENIGN PROSTATIC HYPERPLASIA WITHOUT LOWER URINARY TRACT SYMPTOMS: ICD-10-CM

## 2025-03-10 PROCEDURE — 1160F RVW MEDS BY RX/DR IN RCRD: CPT | Performed by: INTERNAL MEDICINE

## 2025-03-10 PROCEDURE — 1159F MED LIST DOCD IN RCRD: CPT | Performed by: INTERNAL MEDICINE

## 2025-03-10 PROCEDURE — 3008F BODY MASS INDEX DOCD: CPT | Performed by: INTERNAL MEDICINE

## 2025-03-10 PROCEDURE — G2211 COMPLEX E/M VISIT ADD ON: HCPCS | Performed by: INTERNAL MEDICINE

## 2025-03-10 PROCEDURE — 1157F ADVNC CARE PLAN IN RCRD: CPT | Performed by: INTERNAL MEDICINE

## 2025-03-10 PROCEDURE — 99214 OFFICE O/P EST MOD 30 MIN: CPT | Performed by: INTERNAL MEDICINE

## 2025-03-10 PROCEDURE — 1036F TOBACCO NON-USER: CPT | Performed by: INTERNAL MEDICINE

## 2025-03-10 RX ORDER — FERROUS SULFATE 325(65) MG
325 TABLET, DELAYED RELEASE (ENTERIC COATED) ORAL
COMMUNITY

## 2025-03-10 RX ORDER — LEVOTHYROXINE SODIUM 200 UG/1
TABLET ORAL
Qty: 90 TABLET | Refills: 3 | Status: SHIPPED | OUTPATIENT
Start: 2025-03-10

## 2025-03-10 ASSESSMENT — PATIENT HEALTH QUESTIONNAIRE - PHQ9
SUM OF ALL RESPONSES TO PHQ9 QUESTIONS 1 AND 2: 0
1. LITTLE INTEREST OR PLEASURE IN DOING THINGS: NOT AT ALL
2. FEELING DOWN, DEPRESSED OR HOPELESS: NOT AT ALL

## 2025-03-10 NOTE — PROGRESS NOTES
"Subjective   Patient ID: Ronald Beckham \"Nicholas\" is a 64 y.o. male who presents for Follow-up (3 month ).    Assessment/Plan     Problem List Items Addressed This Visit       Hypothyroidism, unspecified     Continue Synthroid monitor TSH twice a year         Relevant Medications    levothyroxine (Synthroid, Levoxyl) 200 mcg tablet    Bipolar affective disorder, currently depressed, mild (Multi)     PHQ 6 not suicidal follow-up with the psych twice a year         BPH (benign prostatic hyperplasia)     Monitor PSA once a year         Adult BMI 50.0-59.9 kg/sq m (Multi) - Primary     Endocrine workup sleep apnea test refer to dietitian gave patient Wegovy         Sjogren's disease     Follow-up with the rheumatologist ophthalmologist          TSH 5.78 hemoglobin A1c 5.7  glucose 136 thyroid normal     Patient advised to continue levothyroxine 175 mg daily Monday to Friday Saturday and Sunday take 200 mcg a day follow-up  HPI 64-year-old patient who had a complex medical problem bipolar disorder sleep apnea autoimmune arthritis seen by the psych and rheumatology today came complaining of weight gain weight induced arthralgia myalgia fatigue tired constipation urgency frequency snoring at night    Negative for suicide homicide    Negative for hallucination or delirium    Negative for hypoglycemia hypoxia or fall    Review lab medications    Discussed about diet exercise and Wegovy patient will try Wegovy 0.25 mg once a weekly personal family history no history of for medullary thyroid cancer parathyroid cancer or pancreatic cancer    Patient will continue levothyroxine diet exercise plus Wegovy we monitor pressure and pancreas and thyroid very closely follow-up  Past Medical History:   Diagnosis Date    Allergic     Anemia     Anxiety     Arthritis     Calculus of gallbladder without cholecystitis without obstruction 04/04/2022    Gallstones    Cellulitis of abdominal wall 09/01/2021    Cellulitis of right " abdominal wall    Cellulitis of left external ear 06/13/2022    Cellulitis of left earlobe    Cellulitis of right lower limb 09/09/2020    Cellulitis of right lower extremity    Deficiency of other specified B group vitamins 03/19/2019    B12 deficiency    Depression     Disease of thyroid gland     Dorsalgia, unspecified 09/17/2022    Chronic back pain greater than 3 months duration    Eczema     Encounter for screening for malignant neoplasm of colon 02/04/2021    Screen for colon cancer    Encounter for screening for malignant neoplasm of rectum 02/04/2021    Screening for rectal cancer    Erythema intertrigo 05/09/2021    Intertrigo    GERD (gastroesophageal reflux disease)     Heart murmur     Hypertension     Impaired fasting glucose 09/17/2018    Impaired fasting glucose    Localized edema 08/26/2022    Lower extremity edema    Lumbago with sciatica, right side 11/01/2018    Lumbago with sciatica, right side    Morbid (severe) obesity due to excess calories (Multi) 11/21/2022    Morbid obesity with BMI of 40.0-44.9, adult    Morbid (severe) obesity due to excess calories (Multi) 07/11/2022    Morbid obesity with BMI of 45.0-49.9, adult    Neuromuscular disorder (Multi)     Other bursitis of knee, right knee 03/01/2021    Bursitis of right knee    Other conditions influencing health status 11/21/2022    Diabetes type 2, uncontrolled    Other obesity 03/03/2022    Moderate obesity    Other obesity 11/04/2021    Moderate obesity    Pain in right elbow 08/26/2022    Right elbow pain    Pain in right foot 08/16/2017    Inflammatory pain of right heel    Pain in right hip 07/12/2018    Right hip pain    Pain in right knee 06/15/2021    Acute pain of right knee    Pain in right knee 04/05/2021    Right knee pain    Personal history of diseases of the blood and blood-forming organs and certain disorders involving the immune mechanism 03/19/2019    History of anemia    Personal history of diseases of the skin and  subcutaneous tissue 05/09/2021    History of contact dermatitis    Personal history of diseases of the skin and subcutaneous tissue 06/13/2022    History of eczema    Personal history of other diseases of the digestive system 04/27/2022    History of constipation    Personal history of other diseases of the musculoskeletal system and connective tissue 09/16/2019    History of polymyalgia rheumatica    Personal history of other diseases of the musculoskeletal system and connective tissue 03/19/2019    History of arthritis    Personal history of other diseases of the musculoskeletal system and connective tissue 03/01/2021    History of acute gouty arthritis    Personal history of other diseases of the nervous system and sense organs 10/28/2019    History of neuropathy    Personal history of other diseases of the nervous system and sense organs 05/26/2016    History of neuropathy    Personal history of other diseases of the respiratory system 11/21/2022    History of chronic obstructive lung disease    Personal history of other endocrine, nutritional and metabolic disease 03/19/2019    History of vitamin D deficiency    Personal history of other endocrine, nutritional and metabolic disease 09/03/2020    History of obesity    Personal history of other endocrine, nutritional and metabolic disease 06/18/2018    History of hypoglycemia    Personal history of other infectious and parasitic diseases 11/30/2015    History of viral infection    Personal history of other specified conditions 08/26/2022    History of edema    Personal history of other specified conditions 08/05/2021    History of abdominal pain    Personal history of urinary (tract) infections 12/23/2014    History of urinary tract infection    Proteinuria, unspecified 05/07/2021    Proteinuria    Rash and other nonspecific skin eruption 09/16/2022    Skin rash    Sunburn of first degree 06/18/2018    Sunburn of first degree    Systemic lupus erythematosus,  "unspecified 04/28/2020    History of systemic lupus erythematosus (SLE)    Type 2 diabetes mellitus with other circulatory complications 09/17/2022    Hypertension associated with diabetes    Unspecified open wound, left lower leg, sequela 09/13/2021    Leg wound, left, sequela    Unspecified osteoarthritis, unspecified site 03/03/2022    Osteoarthritis     Past Surgical History:   Procedure Laterality Date    BACK SURGERY  03/29/2022    Back Surgery    CHOLECYSTECTOMY      CIRCUMCISION, PRIMARY      ELBOW SURGERY  12/18/2014    Elbow Surgery    PROSTATE SURGERY      SHOULDER SURGERY  12/18/2014    Shoulder Surgery    SPINE SURGERY      TONSILLECTOMY       Allergies   Allergen Reactions    Cat's Claw Shortness of breath and Wheezing     VERIFIED BY ABRAM SEGURA RN    Gabapentin Shortness of breath and Rash    Methotrexate Analogues Shortness of breath    Penicillins Shortness of breath and Other     Unknown reaction    VERIFIED BY ABRAM SEGURA RN    Aripiprazole Swelling and Other     Can't remember reaction    VERIFIED BY ABRAM SEGURA RN    Bacitracin Swelling    Bee Venom Protein (Honey Bee) Unknown    Benzoin Other     Skin peels off -VERIFIED BY ABRAM SEGURA RN    Benzoin Compound Other     Skin peels off     Codeine Unknown    Bgp-Werxluuxy-Pu-Acetaminophen Unknown    Docusate Unknown    Ex-Lax Hives     Blister    Lysolecithin Unknown    Meperidine Hives     Was told he was allergic while in hospital    Meperidine (Pf) Other    Neomycin Unknown    Neomycin-Bacitracin-Polymyxin Other     \"makes it worse instead of making it better\"    Neosporin (Neomycin-Polymyx) Unknown    Other Hives     SUN     Phenolphthalein Swelling and Hives     VERIFIED BY ABRAM SEGURA RN    Phenylephrine-Acetaminophen Swelling     VERIFIED BY ABRAM SEGURA RN    Polymyxin B Sulf-Trimethoprim Other    Povidone-Iodine Other     VERIFIED BY ABRAM SEGURA RN    Pseudoephedrine-Acetaminophen Other    Senna Other    Sennosides Other    " Tizanidine Unknown    Wasp Venom Unknown    Latex, Natural Rubber Hives and Rash    Leflunomide Rash    Lithium Analogues Rash and Other     Uncontrollable body jerking    VERIFIED BY ABRAM SEGURA RN     Current Outpatient Medications   Medication Sig Dispense Refill    azaTHIOprine (Imuran) 50 mg tablet Take 3 tablets (150 mg) by mouth once daily.      DULoxetine (Cymbalta) 60 mg DR capsule Take 1 capsule (60 mg) by mouth 2 times a day. Do not crush or chew.      esomeprazole (NexIUM) 40 mg DR capsule TAKE 1 CAPSULE BY MOUTH EVERY DAY 90 capsule 3    hydroxychloroquine (Plaquenil) 200 mg tablet TAKE 1 TABLET BY MOUTH TWICE A  tablet 1    lamoTRIgine (LaMICtal) 200 mg tablet TAKE 1 TABLET BY MOUTH AT BEDTIME DAILY      levothyroxine (Synthroid, Levoxyl) 175 mcg tablet TAKE 1 TABLET BY MOUTH DAILY MONDAY THROUGH SATURDAY AND 1 & 1/2 TABLETS ON SUNDAYS (Patient taking differently: Take 1 tablet (175 mcg) by mouth. Tuesday Wednesday Thursday and Friday) 96 tablet 3    levothyroxine (Synthroid, Levoxyl) 200 mcg tablet TAKE 1 TAB BY MOUTH ON SATURDAY & SUNDAY & Monday TAKE ON AN EMPTY STOMACH AT THE SAME TIME EACH DAY, EITHER 30 TO 60 MINUTES PRIOR TO BREAKFAST 90 tablet 3    meloxicam (Mobic) 15 mg tablet Take 1 tablet (15 mg) by mouth once daily.      mycophenolate (Cellcept) 250 mg capsule TAKE 1 CAPSULE (250 MG) BY MOUTH 2 TIMES A DAY. 180 capsule 1    naloxone (Narcan) 4 mg/0.1 mL nasal spray 1 puff when necessary for signs of overdose      NON FORMULARY Morphine Sulfate      olmesartan (BENIcar) 40 mg tablet TAKE 1 TABLET BY MOUTH EVERY DAY 90 tablet 3    oxybutynin XL (Ditropan-XL) 15 mg 24 hr tablet Take 1 tablet (15 mg) by mouth early in the morning..      pilocarpine (Salagen) 5 mg tablet Take 1 tablet (5 mg) by mouth 3 times a day. 90 tablet 2    tamsulosin (Flomax) 0.4 mg 24 hr capsule Take by mouth.       No current facility-administered medications for this visit.     Family History   Problem  Relation Name Age of Onset    Diabetes Mother Shobha Beckham     Arthritis Father Chalino Beckham     Cancer Brother Neftaly beckham      Social History     Socioeconomic History    Marital status:    Tobacco Use    Smoking status: Former     Current packs/day: 1.50     Average packs/day: 1.5 packs/day for 40.0 years (60.0 ttl pk-yrs)     Types: Cigarettes    Smokeless tobacco: Never   Substance and Sexual Activity    Alcohol use: Never    Drug use: Never     Immunization History   Administered Date(s) Administered    Flu vaccine (IIV4), preservative free *Check age/dose* 10/07/2023    Flu vaccine, trivalent, preservative free, HIGH-DOSE, age 65y+ (Fluzone) 09/09/2024    Hep A / Hep B 01/05/2024, 05/06/2024    Influenza Nasal, Unspecified 11/18/2013    Influenza, Unspecified 10/05/2012, 11/18/2013, 10/25/2016, 08/14/2017, 09/17/2018, 09/16/2019, 09/03/2020, 09/29/2022    Influenza, seasonal, injectable 10/25/2016    MMR vaccine, subcutaneous (MMR II) 05/06/2024    PPD Test 05/28/2012, 05/30/2012    Pfizer COVID-19 vaccine, 12 years and older, (30mcg/0.3mL) (Comirnaty) 10/07/2023, 05/06/2024    Pfizer COVID-19 vaccine, bivalent, age 12 years and older (30 mcg/0.3 mL) 09/19/2022    Pfizer Gray Cap SARS-CoV-2 04/06/2022    Pfizer Purple Cap SARS-CoV-2 03/10/2021, 03/31/2021, 10/15/2021, 09/19/2022    Pneumococcal conjugate vaccine, 20-valent (PREVNAR 20) 04/17/2023    Pneumococcal polysaccharide vaccine, 23-valent, age 2 years and older (PNEUMOVAX 23) 10/28/2019    RESPIRATORY SYNCYTIAL VIRUS (RSV), ELIGIBLE PREGNANT PTS, 0.5 ML (ABRYSVO) 01/05/2024    SARS-CoV-2, Unspecified 04/06/2022    Td vaccine, age 7 years and older (TENIVAC) 05/06/2024    Tdap vaccine, age 7 year and older (BOOSTRIX, ADACEL) 03/15/2018    Zoster vaccine, recombinant, adult (SHINGRIX) 05/01/2018, 08/15/2018, 05/28/2020    Zoster, Unspecified 08/15/2018       Review of Systems  Review of systems is otherwise negative unless stated above  "or in history of present illness.    Objective   Visit Vitals  /67   Pulse 92   Temp 36.1 °C (97 °F)   Ht 1.803 m (5' 11\")   Wt (!) 166 kg (366 lb)   SpO2 97%   BMI 51.05 kg/m²   Smoking Status Former   BSA 2.88 m²     Physical Exam  Constitutional: BMI 51     General: not in acute distress.   HENT: Runny nose no jaundice     Head: Normocephalic and atraumatic.      Nose: Nose normal.   Eyes:      Extraocular Movements: Extraocular movements intact.      Conjunctiva/sclera: Conjunctivae normal.   Cardiovascular: Heart murmur     Rate and Rhythm: Normal rate ,  No M/R/G  Pulmonary: Rhonchi     Effort: Pulmonary effort is normal.      Breath sounds: Normal, Bilat Equal AE  Skin: Eczema     General: Skin is warm.   Neurological: No lateralization     Mental Status: He is alert and oriented to person, place, and time.   Psychiatric:    Not suicidal     Mood and Affect: Mood normal.         Behavior: Behavior normal.   Musculoskeletal arthralgia  FROM in all extremitirs,  Joint-no swelling or tenderness    Lab on 12/10/2024   Component Date Value Ref Range Status    Hemoglobin A1C 12/10/2024 5.7 (H)  See comment % Final    Estimated Average Glucose 12/10/2024 117  Not Established mg/dL Final    Cholesterol 12/10/2024 175  0 - 199 mg/dL Final    HDL-Cholesterol 12/10/2024 45.2  mg/dL Final    Cholesterol/HDL Ratio 12/10/2024 3.9   Final    LDL Calculated 12/10/2024 108 (H)  <=99 mg/dL Final    VLDL 12/10/2024 22  0 - 40 mg/dL Final    Triglycerides 12/10/2024 111  0 - 149 mg/dL Final    Non HDL Cholesterol 12/10/2024 130  0 - 149 mg/dL Final    Glucose 12/10/2024 136 (H)  74 - 99 mg/dL Final    Sodium 12/10/2024 138  136 - 145 mmol/L Final    Potassium 12/10/2024 4.8  3.5 - 5.3 mmol/L Final    Chloride 12/10/2024 104  98 - 107 mmol/L Final    Bicarbonate 12/10/2024 27  21 - 32 mmol/L Final    Anion Gap 12/10/2024 12  10 - 20 mmol/L Final    Urea Nitrogen 12/10/2024 13  6 - 23 mg/dL Final    Creatinine 12/10/2024 " 0.81  0.50 - 1.30 mg/dL Final    eGFR 12/10/2024 >90  >60 mL/min/1.73m*2 Final    Calcium 12/10/2024 8.8  8.6 - 10.6 mg/dL Final    Albumin 12/10/2024 4.0  3.4 - 5.0 g/dL Final    Alkaline Phosphatase 12/10/2024 66  33 - 136 U/L Final    Total Protein 12/10/2024 6.8  6.4 - 8.2 g/dL Final    AST 12/10/2024 16  9 - 39 U/L Final    Bilirubin, Total 12/10/2024 0.5  0.0 - 1.2 mg/dL Final    ALT 12/10/2024 12  10 - 52 U/L Final    Thyroid Stimulating Hormone 12/10/2024 5.78 (H)  0.44 - 3.98 mIU/L Final    Thyroxine, Free 12/10/2024 1.30  0.78 - 1.48 ng/dL Final       Radiology: Reviewed imaging in powerchart.  Intrathecal Pump Refill    Result Date: 2/21/2025  Table formatting from the original result was not included. Procedure Intrathecal Pump Refill Indication Postlaminectomy syndrome Medications Totals unavailable because the procedure time range is not set Preprocedure A history and physical has been performed, and patient medication allergies have been reviewed. The patient's tolerance of previous anesthesia has been reviewed. The risks and benefits of the procedure and the sedation options and risks were discussed with the patient. All questions were answered and informed consent obtained. Details of the Procedure Operative Report The patient was taken to the procedure area, was placed into a supine positioning. The pump was interrogated and showed a refill volume of 7 mL. The pump then was emptied and a new solution of morphine preservative-free at a dose of 5 mg/mL, a total volume of 20 mL was injected into the pump, and the pump was reprogrammed to deliver a dose of 0.719 mg of morphine with PTM 0.1 mg/inj. maximum 2 injections/day per day.  The alarm date was set for 5/30/2025.The patient recovered for sufficient amount of time and then was discharged home in stable condition. Patient was advised to [facility]up with [facility] to [facility]ill his pump accordingly. Procedure Provider Sandra Hernandez,  MD Procedure Location [address] [place] [address] Referring Provider Sandra Hernandez MD [address] [place] [address], [address] [place] [address]         Charting was completed using voice recognition technology and may include unintended errors.

## 2025-04-03 ENCOUNTER — TELEPHONE (OUTPATIENT)
Dept: PRIMARY CARE | Facility: CLINIC | Age: 65
End: 2025-04-03
Payer: COMMERCIAL

## 2025-04-17 ENCOUNTER — OFFICE VISIT (OUTPATIENT)
Dept: PRIMARY CARE | Facility: CLINIC | Age: 65
End: 2025-04-17
Payer: COMMERCIAL

## 2025-04-17 VITALS
SYSTOLIC BLOOD PRESSURE: 138 MMHG | WEIGHT: 273 LBS | HEIGHT: 71 IN | BODY MASS INDEX: 38.22 KG/M2 | OXYGEN SATURATION: 92 % | HEART RATE: 67 BPM | DIASTOLIC BLOOD PRESSURE: 75 MMHG

## 2025-04-17 DIAGNOSIS — E03.9 ACQUIRED HYPOTHYROIDISM: ICD-10-CM

## 2025-04-17 DIAGNOSIS — S97.111A CRUSHING INJURY OF RIGHT GREAT TOE, INITIAL ENCOUNTER: ICD-10-CM

## 2025-04-17 DIAGNOSIS — M10.9 GOUT, UNSPECIFIED CAUSE, UNSPECIFIED CHRONICITY, UNSPECIFIED SITE: ICD-10-CM

## 2025-04-17 DIAGNOSIS — D50.0 IRON DEFICIENCY ANEMIA DUE TO CHRONIC BLOOD LOSS: ICD-10-CM

## 2025-04-17 DIAGNOSIS — M79.674 TOE PAIN, RIGHT: Primary | ICD-10-CM

## 2025-04-17 DIAGNOSIS — R73.9 HYPERGLYCEMIA: ICD-10-CM

## 2025-04-17 DIAGNOSIS — M35.05 SJOGREN SYNDROME WITH INFLAMMATORY ARTHRITIS: ICD-10-CM

## 2025-04-17 PROCEDURE — 1157F ADVNC CARE PLAN IN RCRD: CPT | Performed by: INTERNAL MEDICINE

## 2025-04-17 PROCEDURE — 99214 OFFICE O/P EST MOD 30 MIN: CPT | Performed by: INTERNAL MEDICINE

## 2025-04-17 PROCEDURE — 1160F RVW MEDS BY RX/DR IN RCRD: CPT | Performed by: INTERNAL MEDICINE

## 2025-04-17 PROCEDURE — 1159F MED LIST DOCD IN RCRD: CPT | Performed by: INTERNAL MEDICINE

## 2025-04-17 PROCEDURE — 1036F TOBACCO NON-USER: CPT | Performed by: INTERNAL MEDICINE

## 2025-04-17 PROCEDURE — 3008F BODY MASS INDEX DOCD: CPT | Performed by: INTERNAL MEDICINE

## 2025-04-17 PROCEDURE — G2211 COMPLEX E/M VISIT ADD ON: HCPCS | Performed by: INTERNAL MEDICINE

## 2025-04-17 RX ORDER — PREDNISONE 5 MG/1
5 TABLET ORAL 2 TIMES DAILY
Qty: 10 TABLET | Refills: 0 | Status: SHIPPED | OUTPATIENT
Start: 2025-04-17 | End: 2026-04-17

## 2025-04-17 RX ORDER — CLINDAMYCIN HYDROCHLORIDE 300 MG/1
300 CAPSULE ORAL 3 TIMES DAILY
Qty: 30 CAPSULE | Refills: 0 | Status: SHIPPED | OUTPATIENT
Start: 2025-04-17 | End: 2025-04-27

## 2025-04-17 RX ORDER — ADALIMUMAB-RYVK 40MG/0.4ML
40 KIT SUBCUTANEOUS
COMMUNITY

## 2025-04-17 NOTE — PROGRESS NOTES
"Subjective   Patient ID: Ronald Beckham \"Robert" is a 65 y.o. male who presents for Toe Pain.    Assessment/Plan     Problem List Items Addressed This Visit       Hypothyroidism, unspecified    Relevant Orders    Hemoglobin A1C    Sjogren's disease    Relevant Orders    Albumin-Creatinine Ratio, Urine Random    CBC and Auto Differential    Comprehensive Metabolic Panel    Hemoglobin A1C    Uric Acid    Iron deficiency anemia due to chronic blood loss    Relevant Orders    CBC and Auto Differential    Toe pain, right - Primary    Relevant Orders    XR toe right 2+ views    Crushing injury of great toe of right foot    Relevant Orders    Uric Acid     Other Visit Diagnoses         Hyperglycemia        Relevant Orders    Hemoglobin A1C      Gout, unspecified cause, unspecified chronicity, unspecified site              Patient was evaluated today, problem list was reviewed, problems and concerns addressed, Rx list reviewed and updated, lab and tests were noted and reviewed. Life style changes were discussed, always it works better if we eat plant based diet and plenty of fibres and roughage. Consume adequate amount of water and avoid alcohol, light to moderate physical activities and stress reduction are always beneficial for ongoing physical well being. Do not forget to have 6 to 7 hours of sleep regularly and avoid late night pushpa screen exposure.    HPI 65-year-old patient who had a history of bipolar disorder obesity hypertension hyperlipidemia hypothyroidism complaining of posttraumatic's increased redness swelling tenderness of the right great toe onset acutely duration 3 days progressed acutely aggravating factor underlying immunocompromise host with the trauma cellulitis exacerbation of the autoimmune arthritis    Negative for lymphangitis nausea vomiting diarrhea constipation so DVT or PE    Negative for fall    Negative for suicide    Negative for nausea vomiting diarrhea constipation    Impression " posttraumatic cellulitis and exacerbation of the pseudogout right great toe right foot given patient clindamycin 303 times a day probiotic twice a day prednisone 5 twice a day hydration sent for the CBC with differential sed rate uric acid and x-ray of the right great toe crest bandage was applied iodine was applied    Hypothyroidism continue levothyroxine    Sjogren disease follow-up with rheumatology    Follow-up  Medical History[1]  Surgical History[2]  Allergies[3]  Current Medications[4]  Family History[5]  Social History[6]  Immunization History   Administered Date(s) Administered    Flu vaccine (IIV4), preservative free *Check age/dose* 10/07/2023    Flu vaccine, trivalent, preservative free, HIGH-DOSE, age 65y+ (Fluzone) 09/09/2024    Hep A / Hep B 01/05/2024, 05/06/2024    Influenza Nasal, Unspecified 11/18/2013    Influenza, Unspecified 10/05/2012, 11/18/2013, 10/25/2016, 08/14/2017, 09/17/2018, 09/16/2019, 09/03/2020, 09/29/2022    Influenza, seasonal, injectable 10/25/2016    MMR vaccine, subcutaneous (MMR II) 05/06/2024    PPD Test 05/28/2012, 05/30/2012    Pfizer COVID-19 vaccine, 12 years and older, (30mcg/0.3mL) (Comirnaty) 10/07/2023, 05/06/2024    Pfizer COVID-19 vaccine, bivalent, age 12 years and older (30 mcg/0.3 mL) 09/19/2022    Pfizer Gray Cap SARS-CoV-2 04/06/2022    Pfizer Purple Cap SARS-CoV-2 03/10/2021, 03/31/2021, 10/15/2021, 09/19/2022    Pneumococcal conjugate vaccine, 20-valent (PREVNAR 20) 04/17/2023    Pneumococcal polysaccharide vaccine, 23-valent, age 2 years and older (PNEUMOVAX 23) 10/28/2019    RESPIRATORY SYNCYTIAL VIRUS (RSV), ELIGIBLE PREGNANT PTS, 0.5 ML (ABRYSVO) 01/05/2024    SARS-CoV-2, Unspecified 04/06/2022    Td vaccine, age 7 years and older (TENIVAC) 05/06/2024    Tdap vaccine, age 7 year and older (BOOSTRIX, ADACEL) 03/15/2018    Zoster vaccine, recombinant, adult (SHINGRIX) 05/01/2018, 08/15/2018, 05/28/2020    Zoster, Unspecified 08/15/2018       Review of  "Systems  Review of systems is otherwise negative unless stated above or in history of present illness.    Objective   Visit Vitals  /75   Pulse 67   Ht 1.791 m (5' 10.5\")   Wt 124 kg (273 lb)   SpO2 92%   BMI 38.62 kg/m²   Smoking Status Former   BSA 2.48 m²     Physical Exam  Constitutional: Obesity advised to lose weight     General: not in acute distress.   HENT:      Head: Normocephalic and atraumatic.      Nose: Nose normal.   Eyes: No jaundice     Extraocular Movements: Extraocular movements intact.      Conjunctiva/sclera: Conjunctivae normal.   Cardiovascular: Heart murmur     Rate and Rhythm: Normal rate ,  No M/R/G  Pulmonary:      Effort: Pulmonary effort is normal.      Breath sounds: Normal, Bilat Equal AE  Skin: Posttraumatic cellulitis right great toe     General: Skin is warm.   Neurological: Neuralgia     Mental Status: He is alert and oriented to person, place, and time.   Psychiatric: Bipolar without suicide   Mood and Affect: Mood normal.         Behavior: Behavior normal.   Musculoskeletal gout pseudogout affecting the right great toe with the moderate swelling tenderness    No visits with results within 4 Month(s) from this visit.   Latest known visit with results is:   Lab on 12/10/2024   Component Date Value Ref Range Status    Hemoglobin A1C 12/10/2024 5.7 (H)  See comment % Final    Estimated Average Glucose 12/10/2024 117  Not Established mg/dL Final    Cholesterol 12/10/2024 175  0 - 199 mg/dL Final    HDL-Cholesterol 12/10/2024 45.2  mg/dL Final    Cholesterol/HDL Ratio 12/10/2024 3.9   Final    LDL Calculated 12/10/2024 108 (H)  <=99 mg/dL Final    VLDL 12/10/2024 22  0 - 40 mg/dL Final    Triglycerides 12/10/2024 111  0 - 149 mg/dL Final    Non HDL Cholesterol 12/10/2024 130  0 - 149 mg/dL Final    Glucose 12/10/2024 136 (H)  74 - 99 mg/dL Final    Sodium 12/10/2024 138  136 - 145 mmol/L Final    Potassium 12/10/2024 4.8  3.5 - 5.3 mmol/L Final    Chloride 12/10/2024 104  98 - " 107 mmol/L Final    Bicarbonate 12/10/2024 27  21 - 32 mmol/L Final    Anion Gap 12/10/2024 12  10 - 20 mmol/L Final    Urea Nitrogen 12/10/2024 13  6 - 23 mg/dL Final    Creatinine 12/10/2024 0.81  0.50 - 1.30 mg/dL Final    eGFR 12/10/2024 >90  >60 mL/min/1.73m*2 Final    Calcium 12/10/2024 8.8  8.6 - 10.6 mg/dL Final    Albumin 12/10/2024 4.0  3.4 - 5.0 g/dL Final    Alkaline Phosphatase 12/10/2024 66  33 - 136 U/L Final    Total Protein 12/10/2024 6.8  6.4 - 8.2 g/dL Final    AST 12/10/2024 16  9 - 39 U/L Final    Bilirubin, Total 12/10/2024 0.5  0.0 - 1.2 mg/dL Final    ALT 12/10/2024 12  10 - 52 U/L Final    Thyroid Stimulating Hormone 12/10/2024 5.78 (H)  0.44 - 3.98 mIU/L Final    Thyroxine, Free 12/10/2024 1.30  0.78 - 1.48 ng/dL Final       Radiology: Reviewed imaging in powerchart.  Imaging  No results found.    Cardiology, Vascular, and Other Imaging  No other imaging results found for the past 7 days        Charting was completed using voice recognition technology and may include unintended errors.            [1]   Past Medical History:  Diagnosis Date    Allergic     Anemia     Anxiety     Arthritis     Calculus of gallbladder without cholecystitis without obstruction 04/04/2022    Gallstones    Cellulitis of abdominal wall 09/01/2021    Cellulitis of right abdominal wall    Cellulitis of left external ear 06/13/2022    Cellulitis of left earlobe    Cellulitis of right lower limb 09/09/2020    Cellulitis of right lower extremity    Deficiency of other specified B group vitamins 03/19/2019    B12 deficiency    Depression     Disease of thyroid gland     Dorsalgia, unspecified 09/17/2022    Chronic back pain greater than 3 months duration    Eczema     Encounter for screening for malignant neoplasm of colon 02/04/2021    Screen for colon cancer    Encounter for screening for malignant neoplasm of rectum 02/04/2021    Screening for rectal cancer    Erythema intertrigo 05/09/2021    Intertrigo    GERD  (gastroesophageal reflux disease)     Heart murmur     Hypertension     Impaired fasting glucose 09/17/2018    Impaired fasting glucose    Localized edema 08/26/2022    Lower extremity edema    Lumbago with sciatica, right side 11/01/2018    Lumbago with sciatica, right side    Morbid (severe) obesity due to excess calories (Multi) 11/21/2022    Morbid obesity with BMI of 40.0-44.9, adult    Morbid (severe) obesity due to excess calories (Multi) 07/11/2022    Morbid obesity with BMI of 45.0-49.9, adult    Neuromuscular disorder (Multi)     Other bursitis of knee, right knee 03/01/2021    Bursitis of right knee    Other conditions influencing health status 11/21/2022    Diabetes type 2, uncontrolled    Other obesity 03/03/2022    Moderate obesity    Other obesity 11/04/2021    Moderate obesity    Pain in right elbow 08/26/2022    Right elbow pain    Pain in right foot 08/16/2017    Inflammatory pain of right heel    Pain in right hip 07/12/2018    Right hip pain    Pain in right knee 06/15/2021    Acute pain of right knee    Pain in right knee 04/05/2021    Right knee pain    Personal history of diseases of the blood and blood-forming organs and certain disorders involving the immune mechanism 03/19/2019    History of anemia    Personal history of diseases of the skin and subcutaneous tissue 05/09/2021    History of contact dermatitis    Personal history of diseases of the skin and subcutaneous tissue 06/13/2022    History of eczema    Personal history of other diseases of the digestive system 04/27/2022    History of constipation    Personal history of other diseases of the musculoskeletal system and connective tissue 09/16/2019    History of polymyalgia rheumatica    Personal history of other diseases of the musculoskeletal system and connective tissue 03/19/2019    History of arthritis    Personal history of other diseases of the musculoskeletal system and connective tissue 03/01/2021    History of acute gouty  arthritis    Personal history of other diseases of the nervous system and sense organs 10/28/2019    History of neuropathy    Personal history of other diseases of the nervous system and sense organs 05/26/2016    History of neuropathy    Personal history of other diseases of the respiratory system 11/21/2022    History of chronic obstructive lung disease    Personal history of other endocrine, nutritional and metabolic disease 03/19/2019    History of vitamin D deficiency    Personal history of other endocrine, nutritional and metabolic disease 09/03/2020    History of obesity    Personal history of other endocrine, nutritional and metabolic disease 06/18/2018    History of hypoglycemia    Personal history of other infectious and parasitic diseases 11/30/2015    History of viral infection    Personal history of other specified conditions 08/26/2022    History of edema    Personal history of other specified conditions 08/05/2021    History of abdominal pain    Personal history of urinary (tract) infections 12/23/2014    History of urinary tract infection    Proteinuria, unspecified 05/07/2021    Proteinuria    Rash and other nonspecific skin eruption 09/16/2022    Skin rash    Sunburn of first degree 06/18/2018    Sunburn of first degree    Systemic lupus erythematosus, unspecified 04/28/2020    History of systemic lupus erythematosus (SLE)    Type 2 diabetes mellitus with other circulatory complications 09/17/2022    Hypertension associated with diabetes    Unspecified open wound, left lower leg, sequela 09/13/2021    Leg wound, left, sequela    Unspecified osteoarthritis, unspecified site 03/03/2022    Osteoarthritis   [2]   Past Surgical History:  Procedure Laterality Date    BACK SURGERY  03/29/2022    Back Surgery    CHOLECYSTECTOMY      CIRCUMCISION, PRIMARY      ELBOW SURGERY  12/18/2014    Elbow Surgery    PROSTATE SURGERY      SHOULDER SURGERY  12/18/2014    Shoulder Surgery    SPINE SURGERY       "TONSILLECTOMY     [3]   Allergies  Allergen Reactions    Cat's Claw Shortness of breath and Wheezing     VERIFIED BY ABRAM SEGURA RN    Gabapentin Shortness of breath and Rash    Methotrexate Analogues Shortness of breath    Penicillins Shortness of breath and Other     Unknown reaction    VERIFIED BY ABRAM SEGURA RN    Aripiprazole Swelling and Other     Can't remember reaction    VERIFIED BY ABRAM SEGURA RN    Bacitracin Swelling    Bee Venom Protein (Honey Bee) Unknown    Benzoin Other     Skin peels off -VERIFIED BY ABRAM SEGURA RN    Benzoin Compound Other     Skin peels off     Codeine Unknown    Vyz-Mhgczojav-Et-Acetaminophen Unknown    Docusate Unknown    Ex-Lax Hives     Blister    Lysolecithin Unknown    Meperidine Hives     Was told he was allergic while in hospital    Meperidine (Pf) Other    Neomycin Unknown    Neomycin-Bacitracin-Polymyxin Other     \"makes it worse instead of making it better\"    Neosporin (Neomycin-Polymyx) Unknown    Other Hives     SUN     Phenolphthalein Swelling and Hives     VERIFIED BY ABRAM SEGURA RN    Phenylephrine-Acetaminophen Swelling     VERIFIED BY ABRAM SEGURA RN    Polymyxin B Sulf-Trimethoprim Other    Povidone-Iodine Other     VERIFIED BY ABRAM SEGURA RN    Pseudoephedrine-Acetaminophen Other    Senna Other    Sennosides Other    Tizanidine Unknown    Wasp Venom Unknown    Latex, Natural Rubber Hives and Rash    Leflunomide Rash    Lithium Analogues Rash and Other     Uncontrollable body jerking    VERIFIED BY ABRAM SEGURA RN   [4]   Current Outpatient Medications   Medication Sig Dispense Refill    adalimumab-ryvk (Roci,CF,) 40 mg/0.4 mL syringe kit Inject 0.4 mL (40 mg) under the skin every 14 (fourteen) days.      DULoxetine (Cymbalta) 60 mg DR capsule Take 1 capsule (60 mg) by mouth 2 times a day. Do not crush or chew.      esomeprazole (NexIUM) 40 mg DR capsule TAKE 1 CAPSULE BY MOUTH EVERY DAY 90 capsule 3    ferrous sulfate 325 (65 Fe) mg EC tablet Take 1 " tablet by mouth 3 times daily (morning, midday, late afternoon). Do not crush, chew, or split.      hydroxychloroquine (Plaquenil) 200 mg tablet TAKE 1 TABLET BY MOUTH TWICE A  tablet 1    lamoTRIgine (LaMICtal) 200 mg tablet TAKE 1 TABLET BY MOUTH AT BEDTIME DAILY      levothyroxine (Synthroid, Levoxyl) 200 mcg tablet TAKE 1 TAB BY MOUTH ON SATURDAY & SUNDAY & Monday TAKE ON AN EMPTY STOMACH AT THE SAME TIME EACH DAY, EITHER 30 TO 60 MINUTES PRIOR TO BREAKFAST 90 tablet 3    meloxicam (Mobic) 15 mg tablet Take 1 tablet (15 mg) by mouth once daily.      mycophenolate (Cellcept) 250 mg capsule TAKE 1 CAPSULE (250 MG) BY MOUTH 2 TIMES A DAY. 180 capsule 1    naloxone (Narcan) 4 mg/0.1 mL nasal spray 1 puff when necessary for signs of overdose      NON FORMULARY Morphine Sulfate      olmesartan (BENIcar) 40 mg tablet TAKE 1 TABLET BY MOUTH EVERY DAY 90 tablet 3    oxybutynin XL (Ditropan-XL) 15 mg 24 hr tablet Take 1 tablet (15 mg) by mouth early in the morning..      pilocarpine (Salagen) 5 mg tablet Take 1 tablet (5 mg) by mouth 3 times a day. 90 tablet 2    semaglutide 0.25 mg or 0.5 mg (2 mg/3 mL) pen injector Inject 0.25 mg under the skin 1 (one) time per week. 3 mL 0    tamsulosin (Flomax) 0.4 mg 24 hr capsule Take by mouth.       No current facility-administered medications for this visit.   [5]   Family History  Problem Relation Name Age of Onset    Diabetes Mother Shobha Pau     Arthritis Father Chalino Pau     Cancer Brother Neftaly pau    [6]   Social History  Socioeconomic History    Marital status:    Tobacco Use    Smoking status: Former     Current packs/day: 1.50     Average packs/day: 1.5 packs/day for 40.0 years (60.0 ttl pk-yrs)     Types: Cigarettes    Smokeless tobacco: Never   Substance and Sexual Activity    Alcohol use: Never    Drug use: Never

## 2025-04-18 ENCOUNTER — TELEPHONE (OUTPATIENT)
Dept: PRIMARY CARE | Facility: CLINIC | Age: 65
End: 2025-04-18
Payer: COMMERCIAL

## 2025-04-18 LAB
ALBUMIN SERPL-MCNC: 4 G/DL (ref 3.6–5.1)
ALBUMIN/CREAT UR: NORMAL MG/G CREAT
ALP SERPL-CCNC: 75 U/L (ref 35–144)
ALT SERPL-CCNC: 22 U/L (ref 9–46)
ANION GAP SERPL CALCULATED.4IONS-SCNC: 7 MMOL/L (CALC) (ref 7–17)
AST SERPL-CCNC: 20 U/L (ref 10–35)
BASOPHILS # BLD AUTO: 40 CELLS/UL (ref 0–200)
BASOPHILS NFR BLD AUTO: 0.7 %
BILIRUB SERPL-MCNC: 0.5 MG/DL (ref 0.2–1.2)
BUN SERPL-MCNC: 16 MG/DL (ref 7–25)
CALCIUM SERPL-MCNC: 9 MG/DL (ref 8.6–10.3)
CHLORIDE SERPL-SCNC: 103 MMOL/L (ref 98–110)
CO2 SERPL-SCNC: 30 MMOL/L (ref 20–32)
CREAT SERPL-MCNC: 0.82 MG/DL (ref 0.7–1.35)
CREAT UR-MCNC: 107 MG/DL (ref 20–320)
EGFRCR SERPLBLD CKD-EPI 2021: 97 ML/MIN/1.73M2
EOSINOPHIL # BLD AUTO: 308 CELLS/UL (ref 15–500)
EOSINOPHIL NFR BLD AUTO: 5.4 %
ERYTHROCYTE [DISTWIDTH] IN BLOOD BY AUTOMATED COUNT: 11.5 % (ref 11–15)
EST. AVERAGE GLUCOSE BLD GHB EST-MCNC: 128 MG/DL
EST. AVERAGE GLUCOSE BLD GHB EST-SCNC: 7.1 MMOL/L
GLUCOSE SERPL-MCNC: 152 MG/DL (ref 65–99)
HBA1C MFR BLD: 6.1 %
HCT VFR BLD AUTO: 40.4 % (ref 38.5–50)
HGB BLD-MCNC: 13.5 G/DL (ref 13.2–17.1)
LYMPHOCYTES # BLD AUTO: 1858 CELLS/UL (ref 850–3900)
LYMPHOCYTES NFR BLD AUTO: 32.6 %
MCH RBC QN AUTO: 33.8 PG (ref 27–33)
MCHC RBC AUTO-ENTMCNC: 33.4 G/DL (ref 32–36)
MCV RBC AUTO: 101 FL (ref 80–100)
MICROALBUMIN UR-MCNC: <0.2 MG/DL
MONOCYTES # BLD AUTO: 616 CELLS/UL (ref 200–950)
MONOCYTES NFR BLD AUTO: 10.8 %
NEUTROPHILS # BLD AUTO: 2879 CELLS/UL (ref 1500–7800)
NEUTROPHILS NFR BLD AUTO: 50.5 %
PLATELET # BLD AUTO: 192 THOUSAND/UL (ref 140–400)
PMV BLD REES-ECKER: 11.6 FL (ref 7.5–12.5)
POTASSIUM SERPL-SCNC: 5.1 MMOL/L (ref 3.5–5.3)
PROT SERPL-MCNC: 7 G/DL (ref 6.1–8.1)
RBC # BLD AUTO: 4 MILLION/UL (ref 4.2–5.8)
SODIUM SERPL-SCNC: 140 MMOL/L (ref 135–146)
URATE SERPL-MCNC: 3.7 MG/DL (ref 4–8)
WBC # BLD AUTO: 5.7 THOUSAND/UL (ref 3.8–10.8)

## 2025-04-18 NOTE — TELEPHONE ENCOUNTER
----- Message from Babak Jeronimo sent at 4/18/2025  8:58 AM EDT -----  Glucose 152 hemoglobin A1c 6.1 advised to come for yearly adult physical checkup low-carb diet follow-up  ----- Message -----  From: [facility] Results In  Sent: 4/17/2025   9:10 PM EDT  To: Babak Jeronimo MD

## 2025-04-18 NOTE — TELEPHONE ENCOUNTER
----- Message from Babak Jeronimo sent at 4/18/2025 11:07 AM EDT -----  1.  No acute fracture or dislocation.   2.  Small osseous erosion along the lateral aspect of the distal portion of the proximal phalanx advised Advil twice a day plus Citracal twice a day follow-up  ----- Message -----  From: Interface, [facility] - Imaging Results In  Sent: 4/18/2025  10:11 AM EDT  To: Babak Jeronimo MD

## 2025-04-21 ENCOUNTER — TELEPHONE (OUTPATIENT)
Dept: PRIMARY CARE | Facility: CLINIC | Age: 65
End: 2025-04-21
Payer: COMMERCIAL

## 2025-04-21 NOTE — TELEPHONE ENCOUNTER
----- Message from Babak Jeronimo sent at 4/21/2025 11:29 AM EDT -----  Megaloblastic take B12 daily see ophthalmology for diabetic retinal pathic examination come for adult physical checkup and for office  ----- Message -----  From: [facility] Results In  Sent: 4/17/2025   9:10 PM EDT  To: Babak Jeronimo MD

## 2025-05-01 ENCOUNTER — TELEPHONE (OUTPATIENT)
Dept: PRIMARY CARE | Facility: CLINIC | Age: 65
End: 2025-05-01
Payer: COMMERCIAL

## 2025-05-02 ENCOUNTER — APPOINTMENT (OUTPATIENT)
Dept: PRIMARY CARE | Facility: CLINIC | Age: 65
End: 2025-05-02
Payer: COMMERCIAL

## 2025-05-10 DIAGNOSIS — K21.9 GASTROESOPHAGEAL REFLUX DISEASE WITHOUT ESOPHAGITIS: ICD-10-CM

## 2025-05-12 RX ORDER — ESOMEPRAZOLE MAGNESIUM 40 MG/1
40 CAPSULE, DELAYED RELEASE ORAL DAILY
Qty: 90 CAPSULE | Refills: 3 | Status: SHIPPED | OUTPATIENT
Start: 2025-05-12

## 2025-05-16 ENCOUNTER — OFFICE VISIT (OUTPATIENT)
Dept: PRIMARY CARE | Facility: CLINIC | Age: 65
End: 2025-05-16
Payer: COMMERCIAL

## 2025-05-16 VITALS
DIASTOLIC BLOOD PRESSURE: 78 MMHG | TEMPERATURE: 96.8 F | HEART RATE: 64 BPM | WEIGHT: 315 LBS | BODY MASS INDEX: 44.1 KG/M2 | SYSTOLIC BLOOD PRESSURE: 138 MMHG | HEIGHT: 71 IN | OXYGEN SATURATION: 96 %

## 2025-05-16 DIAGNOSIS — M79.674 TOE PAIN, RIGHT: ICD-10-CM

## 2025-05-16 DIAGNOSIS — F31.31 BIPOLAR AFFECTIVE DISORDER, CURRENTLY DEPRESSED, MILD (MULTI): ICD-10-CM

## 2025-05-16 DIAGNOSIS — E03.9 ACQUIRED HYPOTHYROIDISM: ICD-10-CM

## 2025-05-16 DIAGNOSIS — N40.0 BENIGN PROSTATIC HYPERPLASIA WITHOUT LOWER URINARY TRACT SYMPTOMS: ICD-10-CM

## 2025-05-16 DIAGNOSIS — M35.00 SJOGREN'S SYNDROME, WITH UNSPECIFIED ORGAN INVOLVEMENT (MULTI): ICD-10-CM

## 2025-05-16 DIAGNOSIS — R05.9 COUGH, UNSPECIFIED TYPE: ICD-10-CM

## 2025-05-16 DIAGNOSIS — Z00.01 ANNUAL VISIT FOR GENERAL ADULT MEDICAL EXAMINATION WITH ABNORMAL FINDINGS: Primary | ICD-10-CM

## 2025-05-16 DIAGNOSIS — D50.0 IRON DEFICIENCY ANEMIA DUE TO CHRONIC BLOOD LOSS: ICD-10-CM

## 2025-05-16 DIAGNOSIS — I10 BENIGN ESSENTIAL HYPERTENSION: ICD-10-CM

## 2025-05-16 DIAGNOSIS — S97.111S: ICD-10-CM

## 2025-05-16 PROBLEM — S97.111A: Status: RESOLVED | Noted: 2025-04-17 | Resolved: 2025-05-16

## 2025-05-16 PROCEDURE — 1160F RVW MEDS BY RX/DR IN RCRD: CPT | Performed by: INTERNAL MEDICINE

## 2025-05-16 PROCEDURE — 3075F SYST BP GE 130 - 139MM HG: CPT | Performed by: INTERNAL MEDICINE

## 2025-05-16 PROCEDURE — 3008F BODY MASS INDEX DOCD: CPT | Performed by: INTERNAL MEDICINE

## 2025-05-16 PROCEDURE — 1036F TOBACCO NON-USER: CPT | Performed by: INTERNAL MEDICINE

## 2025-05-16 PROCEDURE — 3078F DIAST BP <80 MM HG: CPT | Performed by: INTERNAL MEDICINE

## 2025-05-16 PROCEDURE — 1159F MED LIST DOCD IN RCRD: CPT | Performed by: INTERNAL MEDICINE

## 2025-05-16 PROCEDURE — 99213 OFFICE O/P EST LOW 20 MIN: CPT | Performed by: INTERNAL MEDICINE

## 2025-05-16 PROCEDURE — 99397 PER PM REEVAL EST PAT 65+ YR: CPT | Performed by: INTERNAL MEDICINE

## 2025-05-16 NOTE — PROGRESS NOTES
"Subjective   Patient ID: Ronald Beckham \"Nicholas\" is a 65 y.o. male who presents for Annual Exam.    Assessment/Plan     Problem List Items Addressed This Visit       Hypothyroidism, unspecified    Monitor TSH twice a year continue Synthroid 200 mcg Monday         Relevant Orders    Albumin-Creatinine Ratio, Urine Random    CBC and Auto Differential    Comprehensive Metabolic Panel    Hemoglobin A1C    Lipid Panel    Magnesium    Urine Culture    Uric Acid    TSH with reflex to Free T4 if abnormal    Prostate Specific Antigen, Screen    Bipolar affective disorder, currently depressed, mild (Multi)    Not suicidal follow-up with psych         Relevant Orders    Albumin-Creatinine Ratio, Urine Random    CBC and Auto Differential    Comprehensive Metabolic Panel    Hemoglobin A1C    Lipid Panel    Magnesium    Urine Culture    Uric Acid    TSH with reflex to Free T4 if abnormal    Prostate Specific Antigen, Screen    BPH (benign prostatic hyperplasia)    Monitor PSA urinalysis once a year         Relevant Orders    Albumin-Creatinine Ratio, Urine Random    CBC and Auto Differential    Comprehensive Metabolic Panel    Hemoglobin A1C    Lipid Panel    Magnesium    Urine Culture    Uric Acid    TSH with reflex to Free T4 if abnormal    Prostate Specific Antigen, Screen    Adult BMI 50.0-59.9 kg/sq m (Multi)    Refer to dietitian and endocrinologist         Relevant Orders    Albumin-Creatinine Ratio, Urine Random    CBC and Auto Differential    Comprehensive Metabolic Panel    Hemoglobin A1C    Lipid Panel    Magnesium    Urine Culture    Uric Acid    TSH with reflex to Free T4 if abnormal    Prostate Specific Antigen, Screen    Sjogren's disease    Refer to rheumatologist ophthalmologist not in pain         Relevant Orders    Albumin-Creatinine Ratio, Urine Random    CBC and Auto Differential    Comprehensive Metabolic Panel    Hemoglobin A1C    Lipid Panel    Magnesium    Urine Culture    Uric Acid    TSH with reflex to " Free T4 if abnormal    Prostate Specific Antigen, Screen    Annual visit for general adult medical examination with abnormal findings - Primary    Relevant Orders    Albumin-Creatinine Ratio, Urine Random    CBC and Auto Differential    Comprehensive Metabolic Panel    Hemoglobin A1C    Lipid Panel    Magnesium    Urine Culture    Uric Acid    TSH with reflex to Free T4 if abnormal    Prostate Specific Antigen, Screen    Benign essential hypertension    Iron deficiency anemia due to chronic blood loss    Relevant Orders    Albumin-Creatinine Ratio, Urine Random    CBC and Auto Differential    Comprehensive Metabolic Panel    Hemoglobin A1C    Lipid Panel    Magnesium    Urine Culture    Uric Acid    TSH with reflex to Free T4 if abnormal    Prostate Specific Antigen, Screen    Toe pain, right    Evaluated by the podiatrist continue follow-up         Relevant Orders    Albumin-Creatinine Ratio, Urine Random    CBC and Auto Differential    Comprehensive Metabolic Panel    Hemoglobin A1C    Lipid Panel    Magnesium    Urine Culture    Uric Acid    TSH with reflex to Free T4 if abnormal    Prostate Specific Antigen, Screen    RESOLVED: Crushing injury of great toe of right foot    Relevant Orders    Albumin-Creatinine Ratio, Urine Random    CBC and Auto Differential    Comprehensive Metabolic Panel    Hemoglobin A1C    Lipid Panel    Magnesium    Urine Culture    Uric Acid    TSH with reflex to Free T4 if abnormal    Prostate Specific Antigen, Screen    Cough    Relevant Orders    Albumin-Creatinine Ratio, Urine Random    CBC and Auto Differential    Comprehensive Metabolic Panel    Hemoglobin A1C    Lipid Panel    Magnesium    Urine Culture    Uric Acid    TSH with reflex to Free T4 if abnormal    Prostate Specific Antigen, Screen    XR chest 2 views     Patient was evaluated today, problem list was reviewed, problems and concerns addressed, Rx list reviewed and updated, lab and tests were noted and reviewed. Life  style changes were discussed, always it works better if we eat plant based diet and plenty of fibres and roughage. Consume adequate amount of water and avoid alcohol, light to moderate physical activities and stress reduction are always beneficial for ongoing physical well being. Do not forget to have 6 to 7 hours of sleep regularly and avoid late night pushpa screen exposure.    HPI 65-year-old patient  with the children personal history of bipolar obesity acid reflux sleep apnea Sjogren disease complaining of foot pain skin irritation lower extremity evaluated by dermatologist podiatrist    Negative for headache chest pain or fall    Negative for suicide    Negative for hematuria rectal bleeding    Negative for PND orthopnea    Review lab medication discussed with the patient    Megaloblastic take B12 daily see ophthalmology for diabetic retinal pathic examination come for adult physical checkup and for office    Babak Jeronimo MD  4/18/2025  8:58 AM EDT       Glucose 152 hemoglobin A1c 6.1 advised to come for yearly adult physical checkup low-carb diet follow-up     Medical History[1]  Surgical History[2]  Allergies[3]  Current Medications[4]  Family History[5]  Social History[6]  Immunization History   Administered Date(s) Administered    Flu vaccine (IIV4), preservative free *Check age/dose* 10/07/2023    Flu vaccine, trivalent, preservative free, HIGH-DOSE, age 65y+ (Fluzone) 09/09/2024    Hep A / Hep B 01/05/2024, 05/06/2024    Influenza Nasal, Unspecified 11/18/2013    Influenza, Unspecified 10/05/2012, 11/18/2013, 10/25/2016, 08/14/2017, 09/17/2018, 09/16/2019, 09/03/2020, 09/29/2022    Influenza, seasonal, injectable 10/25/2016    MMR vaccine, subcutaneous (MMR II) 05/06/2024    PPD Test 05/28/2012, 05/30/2012    Pfizer COVID-19 vaccine, 12 years and older, (30mcg/0.3mL) (Comirnaty) 10/07/2023, 05/06/2024    Pfizer COVID-19 vaccine, bivalent, age 12 years and older (30 mcg/0.3 mL) 09/19/2022     "Pfizer Gray Cap SARS-CoV-2 04/06/2022    Pfizer Purple Cap SARS-CoV-2 03/10/2021, 03/31/2021, 10/15/2021, 09/19/2022    Pneumococcal conjugate vaccine, 20-valent (PREVNAR 20) 04/17/2023    Pneumococcal polysaccharide vaccine, 23-valent, age 2 years and older (PNEUMOVAX 23) 10/28/2019    RESPIRATORY SYNCYTIAL VIRUS (RSV), ELIGIBLE PREGNANT PTS, 0.5 ML (ABRYSVO) 01/05/2024    SARS-CoV-2, Unspecified 04/06/2022    Td vaccine, age 7 years and older (TENIVAC) 05/06/2024    Tdap vaccine, age 7 year and older (BOOSTRIX, ADACEL) 03/15/2018    Zoster vaccine, recombinant, adult (SHINGRIX) 05/01/2018, 08/15/2018, 05/28/2020    Zoster, Unspecified 08/15/2018       Review of Systems  Review of systems is otherwise negative unless stated above or in history of present illness.    Objective   Visit Vitals  /78 (BP Location: Left arm, Patient Position: Sitting)   Pulse 64   Temp 36 °C (96.8 °F)   Ht 1.791 m (5' 10.5\")   Wt (!) 171 kg (376 lb)   SpO2 96%   BMI 53.19 kg/m²   Smoking Status Former   BSA 2.92 m²     Physical Exam  Constitutional: BMI 53     General: not in acute distress.   HENT: Runny nose     Head: Normocephalic and atraumatic.      Nose: Nose normal.   Eyes:      Extraocular Movements: Extraocular movements intact.      Conjunctiva/sclera: Conjunctivae normal.   Cardiovascular: Heart murmur     Rate and Rhythm: Normal rate ,  No M/R/G  Pulmonary: Rhonchi     Effort: Pulmonary effort is normal.      Breath sounds: Normal, Bilat Equal AE  Skin: Dermatitis     General: Skin is warm.   Neurological: Lumbar neuropathy radiculopathy     Mental Status: He is alert and oriented to person, place, and time.   Psychiatric:    Anxiety depression bipolar not suicidal     Mood and Affect: Mood normal.         Behavior: Behavior normal.   Musculoskeletal arthritis of foot and spine  FROM in all extremitirs,  Joint-no swelling or tenderness    Office Visit on 04/17/2025   Component Date Value Ref Range Status    CREATININE, " RANDOM URINE 04/17/2025 107  20 - 320 mg/dL Final    ALBUMIN, URINE 04/17/2025 <0.2  See Note: mg/dL Final    ALBUMIN/CREATININE RATIO, RANDOM U* 04/17/2025 NOTE  <30 mg/g creat Final    WHITE BLOOD CELL COUNT 04/17/2025 5.7  3.8 - 10.8 Thousand/uL Final    RED BLOOD CELL COUNT 04/17/2025 4.00 (L)  4.20 - 5.80 Million/uL Final    HEMOGLOBIN 04/17/2025 13.5  13.2 - 17.1 g/dL Final    HEMATOCRIT 04/17/2025 40.4  38.5 - 50.0 % Final    MCV 04/17/2025 101.0 (H)  80.0 - 100.0 fL Final    MCH 04/17/2025 33.8 (H)  27.0 - 33.0 pg Final    MCHC 04/17/2025 33.4  32.0 - 36.0 g/dL Final    RDW 04/17/2025 11.5  11.0 - 15.0 % Final    PLATELET COUNT 04/17/2025 192  140 - 400 Thousand/uL Final    MPV 04/17/2025 11.6  7.5 - 12.5 fL Final    ABSOLUTE NEUTROPHILS 04/17/2025 2,879  1,500 - 7,800 cells/uL Final    ABSOLUTE LYMPHOCYTES 04/17/2025 1,858  850 - 3,900 cells/uL Final    ABSOLUTE MONOCYTES 04/17/2025 616  200 - 950 cells/uL Final    ABSOLUTE EOSINOPHILS 04/17/2025 308  15 - 500 cells/uL Final    ABSOLUTE BASOPHILS 04/17/2025 40  0 - 200 cells/uL Final    NEUTROPHILS 04/17/2025 50.5  % Final    LYMPHOCYTES 04/17/2025 32.6  % Final    MONOCYTES 04/17/2025 10.8  % Final    EOSINOPHILS 04/17/2025 5.4  % Final    BASOPHILS 04/17/2025 0.7  % Final    GLUCOSE 04/17/2025 152 (H)  65 - 99 mg/dL Final    UREA NITROGEN (BUN) 04/17/2025 16  7 - 25 mg/dL Final    CREATININE 04/17/2025 0.82  0.70 - 1.35 mg/dL Final    EGFR 04/17/2025 97  > OR = 60 mL/min/1.73m2 Final    SODIUM 04/17/2025 140  135 - 146 mmol/L Final    POTASSIUM 04/17/2025 5.1  3.5 - 5.3 mmol/L Final    CHLORIDE 04/17/2025 103  98 - 110 mmol/L Final    CARBON DIOXIDE 04/17/2025 30  20 - 32 mmol/L Final    ELECTROLYTE BALANCE 04/17/2025 7  7 - 17 mmol/L (calc) Final    CALCIUM 04/17/2025 9.0  8.6 - 10.3 mg/dL Final    PROTEIN, TOTAL 04/17/2025 7.0  6.1 - 8.1 g/dL Final    ALBUMIN 04/17/2025 4.0  3.6 - 5.1 g/dL Final    BILIRUBIN, TOTAL 04/17/2025 0.5  0.2 - 1.2 mg/dL Final     ALKALINE PHOSPHATASE 04/17/2025 75  35 - 144 U/L Final    AST 04/17/2025 20  10 - 35 U/L Final    ALT 04/17/2025 22  9 - 46 U/L Final    HEMOGLOBIN A1c 04/17/2025 6.1 (H)  <5.7 % Final    eAG (mg/dL) 04/17/2025 128  mg/dL Final    eAG (mmol/L) 04/17/2025 7.1  mmol/L Final    URIC ACID 04/17/2025 3.7 (L)  4.0 - 8.0 mg/dL Final       Radiology: Reviewed imaging in powerchart.  Imaging  No results found.    Cardiology, Vascular, and Other Imaging  No other imaging results found for the past 7 days        Charting was completed using voice recognition technology and may include unintended errors.            [1]   Past Medical History:  Diagnosis Date    Allergic     Anemia     Anxiety     Arthritis     Calculus of gallbladder without cholecystitis without obstruction 04/04/2022    Gallstones    Cataract     Cellulitis of abdominal wall 09/01/2021    Cellulitis of right abdominal wall    Cellulitis of left external ear 06/13/2022    Cellulitis of left earlobe    Cellulitis of right lower limb 09/09/2020    Cellulitis of right lower extremity    Deficiency of other specified B group vitamins 03/19/2019    B12 deficiency    Depression     Disease of thyroid gland     Dorsalgia, unspecified 09/17/2022    Chronic back pain greater than 3 months duration    Eczema     Encounter for screening for malignant neoplasm of colon 02/04/2021    Screen for colon cancer    Encounter for screening for malignant neoplasm of rectum 02/04/2021    Screening for rectal cancer    Erythema intertrigo 05/09/2021    Intertrigo    GERD (gastroesophageal reflux disease)     Heart murmur     Hypertension     Impaired fasting glucose 09/17/2018    Impaired fasting glucose    Localized edema 08/26/2022    Lower extremity edema    Lumbago with sciatica, right side 11/01/2018    Lumbago with sciatica, right side    Morbid (severe) obesity due to excess calories (Multi) 11/21/2022    Morbid obesity with BMI of 40.0-44.9, adult    Morbid (severe)  obesity due to excess calories (Multi) 07/11/2022    Morbid obesity with BMI of 45.0-49.9, adult    Neuromuscular disorder (Multi)     Other bursitis of knee, right knee 03/01/2021    Bursitis of right knee    Other conditions influencing health status 11/21/2022    Diabetes type 2, uncontrolled    Other obesity 03/03/2022    Moderate obesity    Other obesity 11/04/2021    Moderate obesity    Pain in right elbow 08/26/2022    Right elbow pain    Pain in right foot 08/16/2017    Inflammatory pain of right heel    Pain in right hip 07/12/2018    Right hip pain    Pain in right knee 06/15/2021    Acute pain of right knee    Pain in right knee 04/05/2021    Right knee pain    Personal history of diseases of the blood and blood-forming organs and certain disorders involving the immune mechanism 03/19/2019    History of anemia    Personal history of diseases of the skin and subcutaneous tissue 05/09/2021    History of contact dermatitis    Personal history of diseases of the skin and subcutaneous tissue 06/13/2022    History of eczema    Personal history of other diseases of the digestive system 04/27/2022    History of constipation    Personal history of other diseases of the musculoskeletal system and connective tissue 09/16/2019    History of polymyalgia rheumatica    Personal history of other diseases of the musculoskeletal system and connective tissue 03/19/2019    History of arthritis    Personal history of other diseases of the musculoskeletal system and connective tissue 03/01/2021    History of acute gouty arthritis    Personal history of other diseases of the nervous system and sense organs 10/28/2019    History of neuropathy    Personal history of other diseases of the nervous system and sense organs 05/26/2016    History of neuropathy    Personal history of other diseases of the respiratory system 11/21/2022    History of chronic obstructive lung disease    Personal history of other endocrine, nutritional and  metabolic disease 03/19/2019    History of vitamin D deficiency    Personal history of other endocrine, nutritional and metabolic disease 09/03/2020    History of obesity    Personal history of other endocrine, nutritional and metabolic disease 06/18/2018    History of hypoglycemia    Personal history of other infectious and parasitic diseases 11/30/2015    History of viral infection    Personal history of other specified conditions 08/26/2022    History of edema    Personal history of other specified conditions 08/05/2021    History of abdominal pain    Personal history of urinary (tract) infections 12/23/2014    History of urinary tract infection    Proteinuria, unspecified 05/07/2021    Proteinuria    Rash and other nonspecific skin eruption 09/16/2022    Skin rash    Sunburn of first degree 06/18/2018    Sunburn of first degree    Systemic lupus erythematosus, unspecified 04/28/2020    History of systemic lupus erythematosus (SLE)    Type 2 diabetes mellitus with other circulatory complications 09/17/2022    Hypertension associated with diabetes    Unspecified open wound, left lower leg, sequela 09/13/2021    Leg wound, left, sequela    Unspecified osteoarthritis, unspecified site 03/03/2022    Osteoarthritis   [2]   Past Surgical History:  Procedure Laterality Date    BACK SURGERY  03/29/2022    Back Surgery    CHOLECYSTECTOMY      CIRCUMCISION, PRIMARY      ELBOW SURGERY  12/18/2014    Elbow Surgery    PROSTATE SURGERY      SHOULDER SURGERY  12/18/2014    Shoulder Surgery    SPINE SURGERY      TONSILLECTOMY     [3]   Allergies  Allergen Reactions    Cat's Claw Shortness of breath and Wheezing     VERIFIED BY ABRAM SEGURA RN    Gabapentin Shortness of breath and Rash    Methotrexate Analogues Shortness of breath    Penicillins Shortness of breath and Other     Unknown reaction    VERIFIED BY ABRAM SEGURA RN    Aripiprazole Swelling and Other     Can't remember reaction    VERIFIED BY ABRAM SEGURA RN     "Bacitracin Swelling    Bee Venom Protein (Honey Bee) Unknown    Benzoin Other     Skin peels off -VERIFIED BY ABRAM SGEURA RN    Benzoin Compound Other     Skin peels off     Codeine Unknown    Ria-Pxoxbgucn-Os-Acetaminophen Unknown    Docusate Unknown    Ex-Lax Hives     Blister    Lysolecithin Unknown    Meperidine Hives     Was told he was allergic while in hospital    Meperidine (Pf) Other    Neomycin Unknown    Neomycin-Bacitracin-Polymyxin Other     \"makes it worse instead of making it better\"    Neosporin (Neomycin-Polymyx) Unknown    Other Hives     SUN     Phenolphthalein Swelling and Hives     VERIFIED BY ABRAM SEGURA RN    Phenylephrine-Acetaminophen Swelling     VERIFIED BY ABRAM SEGURA RN    Polymyxin B Sulf-Trimethoprim Other    Povidone-Iodine Other     VERIFIED BY ABRAM SEGURA RN    Pseudoephedrine-Acetaminophen Other    Senna Other    Sennosides Other    Tizanidine Unknown    Wasp Venom Unknown    Adalimumab-Ryvk Rash    Latex, Natural Rubber Hives and Rash    Leflunomide Rash    Lithium Analogues Rash and Other     Uncontrollable body jerking    VERIFIED BY ABRAM SEGURA RN   [4]   Current Outpatient Medications   Medication Sig Dispense Refill    adalimumab-ryvk (Simlandi,CF,) 40 mg/0.4 mL syringe kit Inject 0.4 mL (40 mg) under the skin every 14 (fourteen) days.      DULoxetine (Cymbalta) 60 mg DR capsule Take 1 capsule (60 mg) by mouth 2 times a day. Do not crush or chew.      esomeprazole (NexIUM) 40 mg DR capsule TAKE 1 CAPSULE BY MOUTH EVERY DAY 90 capsule 3    ferrous sulfate 325 (65 Fe) mg EC tablet Take 1 tablet by mouth 3 times daily (morning, midday, late afternoon). Do not crush, chew, or split.      hydroxychloroquine (Plaquenil) 200 mg tablet TAKE 1 TABLET BY MOUTH TWICE A  tablet 1    lamoTRIgine (LaMICtal) 200 mg tablet TAKE 1 TABLET BY MOUTH AT BEDTIME DAILY      levothyroxine (Synthroid, Levoxyl) 200 mcg tablet TAKE 1 TAB BY MOUTH ON SATURDAY & SUNDAY & Monday TAKE ON AN " EMPTY STOMACH AT THE SAME TIME EACH DAY, EITHER 30 TO 60 MINUTES PRIOR TO BREAKFAST 90 tablet 3    meloxicam (Mobic) 15 mg tablet Take 1 tablet (15 mg) by mouth once daily.      mycophenolate (Cellcept) 250 mg capsule TAKE 1 CAPSULE (250 MG) BY MOUTH 2 TIMES A DAY. 180 capsule 1    naloxone (Narcan) 4 mg/0.1 mL nasal spray 1 puff when necessary for signs of overdose      NON FORMULARY Morphine Sulfate      olmesartan (BENIcar) 40 mg tablet TAKE 1 TABLET BY MOUTH EVERY DAY 90 tablet 3    oxybutynin XL (Ditropan-XL) 15 mg 24 hr tablet Take 1 tablet (15 mg) by mouth early in the morning..      pilocarpine (Salagen) 5 mg tablet Take 1 tablet (5 mg) by mouth 3 times a day. 90 tablet 2    predniSONE (Deltasone) 5 mg tablet Take 1 tablet (5 mg) by mouth 2 times a day. 10 tablet 0    semaglutide 0.25 mg or 0.5 mg (2 mg/3 mL) pen injector Inject 0.25 mg under the skin 1 (one) time per week. 3 mL 0    tamsulosin (Flomax) 0.4 mg 24 hr capsule Take by mouth.       No current facility-administered medications for this visit.   [5]   Family History  Problem Relation Name Age of Onset    Diabetes Mother Shobha Beckham     Arthritis Father Chalino Pau     Cancer Brother Neftaly pau     Cancer Brother Neftaly pau    [6]   Social History  Socioeconomic History    Marital status:    Tobacco Use    Smoking status: Former     Current packs/day: 1.50     Average packs/day: 1.5 packs/day for 40.0 years (60.0 ttl pk-yrs)     Types: Cigarettes    Smokeless tobacco: Never   Substance and Sexual Activity    Alcohol use: Never    Drug use: Never    Sexual activity: Not Currently     Partners: Female     Birth control/protection: Condom Male

## 2025-05-16 NOTE — ASSESSMENT & PLAN NOTE
Evaluated by the podiatrist continue follow-up  
Monitor PSA urinalysis once a year  
Monitor TSH twice a year continue Synthroid 200 mcg Monday  
Not suicidal follow-up with psych  
Refer to dietitian and endocrinologist  
Refer to rheumatologist ophthalmologist not in pain  
DISCHARGE

## 2025-05-18 LAB
ALBUMIN SERPL-MCNC: 3.9 G/DL (ref 3.6–5.1)
ALBUMIN/CREAT UR: NORMAL
ALP SERPL-CCNC: 66 U/L (ref 35–144)
ALT SERPL-CCNC: 22 U/L (ref 9–46)
ANION GAP SERPL CALCULATED.4IONS-SCNC: 7 MMOL/L (CALC) (ref 7–17)
AST SERPL-CCNC: 18 U/L (ref 10–35)
BACTERIA UR CULT: NORMAL
BASOPHILS # BLD AUTO: 40 CELLS/UL (ref 0–200)
BASOPHILS NFR BLD AUTO: 1 %
BILIRUB SERPL-MCNC: 0.3 MG/DL (ref 0.2–1.2)
BUN SERPL-MCNC: 14 MG/DL (ref 7–25)
CALCIUM SERPL-MCNC: 8.5 MG/DL (ref 8.6–10.3)
CHLORIDE SERPL-SCNC: 104 MMOL/L (ref 98–110)
CHOLEST SERPL-MCNC: 172 MG/DL
CHOLEST/HDLC SERPL: 3.5 (CALC)
CO2 SERPL-SCNC: 28 MMOL/L (ref 20–32)
CREAT SERPL-MCNC: 0.89 MG/DL (ref 0.7–1.35)
CREAT UR-MCNC: NORMAL MG/DL
EGFRCR SERPLBLD CKD-EPI 2021: 95 ML/MIN/1.73M2
EOSINOPHIL # BLD AUTO: 280 CELLS/UL (ref 15–500)
EOSINOPHIL NFR BLD AUTO: 7 %
ERYTHROCYTE [DISTWIDTH] IN BLOOD BY AUTOMATED COUNT: 11.6 % (ref 11–15)
EST. AVERAGE GLUCOSE BLD GHB EST-MCNC: 134 MG/DL
EST. AVERAGE GLUCOSE BLD GHB EST-SCNC: 7.4 MMOL/L
GLUCOSE SERPL-MCNC: 125 MG/DL (ref 65–139)
HBA1C MFR BLD: 6.3 %
HCT VFR BLD AUTO: 37.6 % (ref 38.5–50)
HDLC SERPL-MCNC: 49 MG/DL
HGB BLD-MCNC: 12.5 G/DL (ref 13.2–17.1)
LDLC SERPL CALC-MCNC: 101 MG/DL (CALC)
LYMPHOCYTES # BLD AUTO: 1816 CELLS/UL (ref 850–3900)
LYMPHOCYTES NFR BLD AUTO: 45.4 %
MAGNESIUM SERPL-MCNC: 2.1 MG/DL (ref 1.5–2.5)
MCH RBC QN AUTO: 33.5 PG (ref 27–33)
MCHC RBC AUTO-ENTMCNC: 33.2 G/DL (ref 32–36)
MCV RBC AUTO: 100.8 FL (ref 80–100)
MICROALBUMIN UR-MCNC: NORMAL
MONOCYTES # BLD AUTO: 440 CELLS/UL (ref 200–950)
MONOCYTES NFR BLD AUTO: 11 %
NEUTROPHILS # BLD AUTO: 1424 CELLS/UL (ref 1500–7800)
NEUTROPHILS NFR BLD AUTO: 35.6 %
NONHDLC SERPL-MCNC: 123 MG/DL (CALC)
PLATELET # BLD AUTO: 151 THOUSAND/UL (ref 140–400)
PMV BLD REES-ECKER: 11.9 FL (ref 7.5–12.5)
POTASSIUM SERPL-SCNC: 4.6 MMOL/L (ref 3.5–5.3)
PROT SERPL-MCNC: 6.5 G/DL (ref 6.1–8.1)
PSA SERPL-MCNC: 0.04 NG/ML
RBC # BLD AUTO: 3.73 MILLION/UL (ref 4.2–5.8)
SODIUM SERPL-SCNC: 139 MMOL/L (ref 135–146)
TRIGL SERPL-MCNC: 128 MG/DL
TSH SERPL-ACNC: 3.26 MIU/L (ref 0.4–4.5)
URATE SERPL-MCNC: 4.1 MG/DL (ref 4–8)
WBC # BLD AUTO: 4 THOUSAND/UL (ref 3.8–10.8)

## 2025-05-19 ENCOUNTER — APPOINTMENT (OUTPATIENT)
Dept: PRIMARY CARE | Facility: CLINIC | Age: 65
End: 2025-05-19
Payer: COMMERCIAL

## 2025-05-19 ENCOUNTER — TELEPHONE (OUTPATIENT)
Dept: PRIMARY CARE | Facility: CLINIC | Age: 65
End: 2025-05-19

## 2025-05-19 LAB
ALBUMIN SERPL-MCNC: 3.9 G/DL (ref 3.6–5.1)
ALBUMIN/CREAT UR: 1 MG/G CREAT
ALP SERPL-CCNC: 66 U/L (ref 35–144)
ALT SERPL-CCNC: 22 U/L (ref 9–46)
ANION GAP SERPL CALCULATED.4IONS-SCNC: 7 MMOL/L (CALC) (ref 7–17)
AST SERPL-CCNC: 18 U/L (ref 10–35)
BACTERIA UR CULT: NORMAL
BASOPHILS # BLD AUTO: 40 CELLS/UL (ref 0–200)
BASOPHILS NFR BLD AUTO: 1 %
BILIRUB SERPL-MCNC: 0.3 MG/DL (ref 0.2–1.2)
BUN SERPL-MCNC: 14 MG/DL (ref 7–25)
CALCIUM SERPL-MCNC: 8.5 MG/DL (ref 8.6–10.3)
CHLORIDE SERPL-SCNC: 104 MMOL/L (ref 98–110)
CHOLEST SERPL-MCNC: 172 MG/DL
CHOLEST/HDLC SERPL: 3.5 (CALC)
CO2 SERPL-SCNC: 28 MMOL/L (ref 20–32)
CREAT SERPL-MCNC: 0.89 MG/DL (ref 0.7–1.35)
CREAT UR-MCNC: 219 MG/DL (ref 20–320)
EGFRCR SERPLBLD CKD-EPI 2021: 95 ML/MIN/1.73M2
EOSINOPHIL # BLD AUTO: 280 CELLS/UL (ref 15–500)
EOSINOPHIL NFR BLD AUTO: 7 %
ERYTHROCYTE [DISTWIDTH] IN BLOOD BY AUTOMATED COUNT: 11.6 % (ref 11–15)
EST. AVERAGE GLUCOSE BLD GHB EST-MCNC: 134 MG/DL
EST. AVERAGE GLUCOSE BLD GHB EST-SCNC: 7.4 MMOL/L
GLUCOSE SERPL-MCNC: 125 MG/DL (ref 65–139)
HBA1C MFR BLD: 6.3 %
HCT VFR BLD AUTO: 37.6 % (ref 38.5–50)
HDLC SERPL-MCNC: 49 MG/DL
HGB BLD-MCNC: 12.5 G/DL (ref 13.2–17.1)
LDLC SERPL CALC-MCNC: 101 MG/DL (CALC)
LYMPHOCYTES # BLD AUTO: 1816 CELLS/UL (ref 850–3900)
LYMPHOCYTES NFR BLD AUTO: 45.4 %
MAGNESIUM SERPL-MCNC: 2.1 MG/DL (ref 1.5–2.5)
MCH RBC QN AUTO: 33.5 PG (ref 27–33)
MCHC RBC AUTO-ENTMCNC: 33.2 G/DL (ref 32–36)
MCV RBC AUTO: 100.8 FL (ref 80–100)
MICROALBUMIN UR-MCNC: 0.3 MG/DL
MONOCYTES # BLD AUTO: 440 CELLS/UL (ref 200–950)
MONOCYTES NFR BLD AUTO: 11 %
NEUTROPHILS # BLD AUTO: 1424 CELLS/UL (ref 1500–7800)
NEUTROPHILS NFR BLD AUTO: 35.6 %
NONHDLC SERPL-MCNC: 123 MG/DL (CALC)
PLATELET # BLD AUTO: 151 THOUSAND/UL (ref 140–400)
PMV BLD REES-ECKER: 11.9 FL (ref 7.5–12.5)
POTASSIUM SERPL-SCNC: 4.6 MMOL/L (ref 3.5–5.3)
PROT SERPL-MCNC: 6.5 G/DL (ref 6.1–8.1)
PSA SERPL-MCNC: 0.04 NG/ML
RBC # BLD AUTO: 3.73 MILLION/UL (ref 4.2–5.8)
SODIUM SERPL-SCNC: 139 MMOL/L (ref 135–146)
TRIGL SERPL-MCNC: 128 MG/DL
TSH SERPL-ACNC: 3.26 MIU/L (ref 0.4–4.5)
URATE SERPL-MCNC: 4.1 MG/DL (ref 4–8)
WBC # BLD AUTO: 4 THOUSAND/UL (ref 3.8–10.8)

## 2025-05-19 NOTE — TELEPHONE ENCOUNTER
----- Message from Babak Jeronimo sent at 5/19/2025 10:24 AM EDT -----  Anemia H&H 12.5 and 37.6 calcium of 8.5 hemoglobin A1c 6.3  advise low-fat low-carb diet plus Slow Fe with vitamin C 1 a day Citracal 1 a day follow-up 3 months bring all your medicine with   you  ----- Message -----  From: [facility] [employer] [facility]  Sent: 5/16/2025   9:56 PM EDT  To: Babak Jeronimo MD

## 2025-05-28 ENCOUNTER — APPOINTMENT (OUTPATIENT)
Dept: PAIN MEDICINE | Facility: CLINIC | Age: 65
End: 2025-05-28
Payer: COMMERCIAL

## 2025-05-28 VITALS
SYSTOLIC BLOOD PRESSURE: 140 MMHG | OXYGEN SATURATION: 94 % | DIASTOLIC BLOOD PRESSURE: 72 MMHG | HEART RATE: 85 BPM | RESPIRATION RATE: 20 BRPM

## 2025-05-28 DIAGNOSIS — M96.1 POSTLAMINECTOMY SYNDROME: ICD-10-CM

## 2025-05-28 PROCEDURE — C1894 INTRO/SHEATH, NON-LASER: HCPCS | Performed by: PAIN MEDICINE

## 2025-05-28 PROCEDURE — 2720000007 HC OR 272 NO HCPCS: Performed by: PAIN MEDICINE

## 2025-05-28 PROCEDURE — 62370 ANL SP INF PMP W/MDREPRG&FIL: CPT | Performed by: PAIN MEDICINE

## 2025-05-28 ASSESSMENT — COLUMBIA-SUICIDE SEVERITY RATING SCALE - C-SSRS
6. HAVE YOU EVER DONE ANYTHING, STARTED TO DO ANYTHING, OR PREPARED TO DO ANYTHING TO END YOUR LIFE?: NO
1. IN THE PAST MONTH, HAVE YOU WISHED YOU WERE DEAD OR WISHED YOU COULD GO TO SLEEP AND NOT WAKE UP?: NO
2. HAVE YOU ACTUALLY HAD ANY THOUGHTS OF KILLING YOURSELF?: NO

## 2025-05-28 ASSESSMENT — PAIN SCALES - GENERAL: PAINLEVEL_OUTOF10: 8

## 2025-05-28 ASSESSMENT — PAIN - FUNCTIONAL ASSESSMENT: PAIN_FUNCTIONAL_ASSESSMENT: 0-10

## 2025-05-28 NOTE — DISCHARGE INSTRUCTIONS
INTRATHECAL PAIN PUMP REFILL DISCHARGE INSTRUCTIONS        DO NOT GET INJECTION SITE WET FOR 24 HOURS  IF BANDAGE HAS BEEN PLACED YOU MAY REMOVE AFTER 24 HOURS  MONITOR FOR SIGNS/SYMPTOMS OF INFECTION:FEVERS REDNESS OR INCREASED PAIN AT INJECTION SITE   YOU MAY RESUME YOUR NORMAL ACTIVITY       IF SIGNS OR SYMPTOMS OF OPIATE OVERDOSE ARE NOTED AFTER YOUR REFILL INCLUDING UNUSUAL SLEEPINESS, UNRESPONSIVENESS, SLOW OR ABSENT BREATHING ADMINISTER NARCAN AS DIRECTED AND CALL [phone]    BEFORE YOU LEAVE OUR OFFICE PLEASE SCHEDULE YOUR NEXT APPOINTMENT TO SCHEDULE YOUR NEXT REFILL APPOINTMENT-IT IS IMPORTANT TO KEEP YOUR APPOINTMENTS SO THAT YOUR PUMP DOES NOT RUN OUT OF MEDICATION AS THIS WILL DAMAGE YOUR PUMP    SHOULD YOU HAVE ANY QUESTIONS OR CONCERNS PLEASE CALL THE OFFICE  [phone]

## 2025-06-05 DIAGNOSIS — I10 HYPERTENSION, UNSPECIFIED TYPE: ICD-10-CM

## 2025-06-05 RX ORDER — OLMESARTAN MEDOXOMIL 40 MG/1
40 TABLET ORAL DAILY
Qty: 90 TABLET | Refills: 3 | Status: SHIPPED | OUTPATIENT
Start: 2025-06-05

## 2025-06-09 ENCOUNTER — APPOINTMENT (OUTPATIENT)
Dept: PRIMARY CARE | Facility: CLINIC | Age: 65
End: 2025-06-09
Payer: COMMERCIAL

## 2025-07-01 ENCOUNTER — TELEPHONE (OUTPATIENT)
Dept: PAIN MEDICINE | Facility: CLINIC | Age: 65
End: 2025-07-01
Payer: COMMERCIAL

## 2025-07-01 NOTE — TELEPHONE ENCOUNTER
"Calls in and states that he feels his  is not working  \"my legs hurt like hell  my whole boy hurts\"  this pain has been going on for at least the past week maybe longer  he states he has been giving himself multiple boluses  he feels the bolus does not help the pain  he does not feel any improvement after he gets the bolus  \"the bolus never helps my pain\"  he states he doesn't know what to do with juan pain  Please advise  "

## 2025-07-02 ENCOUNTER — OFFICE VISIT (OUTPATIENT)
Dept: PAIN MEDICINE | Facility: CLINIC | Age: 65
End: 2025-07-02
Payer: COMMERCIAL

## 2025-07-02 ENCOUNTER — OFFICE VISIT (OUTPATIENT)
Dept: PRIMARY CARE | Facility: CLINIC | Age: 65
End: 2025-07-02
Payer: COMMERCIAL

## 2025-07-02 DIAGNOSIS — M96.1 POSTLAMINECTOMY SYNDROME OF LUMBAR REGION: Primary | ICD-10-CM

## 2025-07-02 PROCEDURE — 1159F MED LIST DOCD IN RCRD: CPT | Performed by: PAIN MEDICINE

## 2025-07-02 PROCEDURE — 99214 OFFICE O/P EST MOD 30 MIN: CPT | Performed by: PAIN MEDICINE

## 2025-07-02 ASSESSMENT — PAIN - FUNCTIONAL ASSESSMENT: PAIN_FUNCTIONAL_ASSESSMENT: 0-10

## 2025-07-02 ASSESSMENT — PAIN SCALES - GENERAL: PAINLEVEL_OUTOF10: 10 - WORST POSSIBLE PAIN

## 2025-07-02 NOTE — H&P
"History Of Present Illness  Ronald eBckham \"Robert" is a 65 y.o. male presenting with chronic back pain radiating down to the lower extremities he is complaining that he does not believe at the current setting of his pump his pump is controlling his symptoms I did interrogate his pump today showing that he is averaging 0.9 injection/day on his PTM and currently his 24 hours dose is at 0.8 mg of morphine.  Rating his pain at the time of my evaluation at 9 out of 10 and confirming that he is unable to move or walk up the steps towards the end of the day because of the pain  I reprogrammed the pump to deliver for him a daily basal dose of 0.86 mg/day and a PTM of 0.15 with a lockout of 3 injections/day the alarm date was set for August 18, 2025 the patient was notified about the new date and he was informed about how to use the pump appropriately it seems that he is under the impression that he is pushing the button but if he does not keep the transmitter on top of the pump he is not getting indeed the injection the patient now have a better understanding of the setting and how it functions  Past Medical History  Medical History[1]  Surgical History  Surgical History[2]  Social History  He reports that he has quit smoking. His smoking use included cigarettes. He has a 60 pack-year smoking history. He has never used smokeless tobacco. He reports that he does not drink alcohol and does not use drugs.    Family History  Family History[3]     Allergies  Allergies[4]  Review of Systems   All 13 systems were reviewed and are within normal levels except as noted below or per HPI. Positive and pertinent negative responses are noted below or in the HPI   Denied any fever or chills. No weight loss and no night sweats. No cough or sputum production. No diarrhea   No constipation  No bladder and bowel incontinence and no other changes in bladder and bowel. No skin changes.   Denied opioids diversion and abuse and denies " alcoholism. Denies overuse of  pain medications.   Physical Exam      Past medical history no interval changes has been noted    On physical examination    General   Alert, oriented x3 pleasant and cooperative. Does not look in any major distress.    HEENT  Pupils normal in size. Ears, nose, mouth, and throat appear to be in normal condition.  Head atraumatic      No signs of sedation or signs of withdrawal apparent.    Psychiatric   No signs of depression apparent.    Neuro   No focal neurological deficit apparent. Ambulation at baseline.      Respiratory  No respiratory distress     Abdomen  no distention     Skin  No skin markings supportive of recent IV drug usage .    Cardiovascular  Regular rate and rhythm       Last Recorded Vitals  There were no vitals taken for this visit.    Assessment/Plan   65 years old with history and physical examination supportive of chronic back pain status post intrathecal pump implant requiring adjustment of the setting of the pump      The patient was advised about the new setting of the pump and his functionality he is aware of the new refill date of August 18, 2025 he will be followed up by [facility] accordingly for the refill or if needed any sooner you have a Narcan kit at home.  Patient confirms that he has a Narcan kit at home and his family member now has to use it      The above clinical summary has been dictated with voice recognition software. It has not been proofread for grammatical errors, typographical mistakes, or other semantic inconsistencies.    Thank you for visiting our office today. It was our pleasure to take part in your healthcare.     Please do not hesitate to contact the pain clinic after your visit with any questions or concerns at  M-F 8-4 pm       Sandra Hernandez M.D.  Medical Director , Division of Pain Medicine [facility]   of Anesthesiology and Pain  Medicine  [facility]     [place] [id number] [facility]  [address]  [place] [id number]     Office: [phone]  Fax: [phone]      Sandra Hernandez MD       [1]   Past Medical History:  Diagnosis Date    Allergic     Anemia     Anxiety     Arthritis     Calculus of gallbladder without cholecystitis without obstruction 04/04/2022    Gallstones    Cataract     Cellulitis of abdominal wall 09/01/2021    Cellulitis of right abdominal wall    Cellulitis of left external ear 06/13/2022    Cellulitis of left earlobe    Cellulitis of right lower limb 09/09/2020    Cellulitis of right lower extremity    Deficiency of other specified B group vitamins 03/19/2019    B12 deficiency    Depression     Disease of thyroid gland     Dorsalgia, unspecified 09/17/2022    Chronic back pain greater than 3 months duration    Eczema     Encounter for screening for malignant neoplasm of colon 02/04/2021    Screen for colon cancer    Encounter for screening for malignant neoplasm of rectum 02/04/2021    Screening for rectal cancer    Erythema intertrigo 05/09/2021    Intertrigo    GERD (gastroesophageal reflux disease)     Heart murmur     Hypertension     Impaired fasting glucose 09/17/2018    Impaired fasting glucose    Localized edema 08/26/2022    Lower extremity edema    Lumbago with sciatica, right side 11/01/2018    Lumbago with sciatica, right side    Morbid (severe) obesity due to excess calories (Multi) 11/21/2022    Morbid obesity with BMI of 40.0-44.9, adult    Morbid (severe) obesity due to excess calories (Multi) 07/11/2022    Morbid obesity with BMI of 45.0-49.9, adult    Neuromuscular disorder (Multi)     Other bursitis of knee, right knee 03/01/2021    Bursitis of right knee    Other conditions influencing health status 11/21/2022    Diabetes type 2, uncontrolled    Other obesity 03/03/2022    Moderate obesity    Other  obesity 11/04/2021    Moderate obesity    Pain in right elbow 08/26/2022    Right elbow pain    Pain in right foot 08/16/2017    Inflammatory pain of right heel    Pain in right hip 07/12/2018    Right hip pain    Pain in right knee 06/15/2021    Acute pain of right knee    Pain in right knee 04/05/2021    Right knee pain    Personal history of diseases of the blood and blood-forming organs and certain disorders involving the immune mechanism 03/19/2019    History of anemia    Personal history of diseases of the skin and subcutaneous tissue 05/09/2021    History of contact dermatitis    Personal history of diseases of the skin and subcutaneous tissue 06/13/2022    History of eczema    Personal history of other diseases of the digestive system 04/27/2022    History of constipation    Personal history of other diseases of the musculoskeletal system and connective tissue 09/16/2019    History of polymyalgia rheumatica    Personal history of other diseases of the musculoskeletal system and connective tissue 03/19/2019    History of arthritis    Personal history of other diseases of the musculoskeletal system and connective tissue 03/01/2021    History of acute gouty arthritis    Personal history of other diseases of the nervous system and sense organs 10/28/2019    History of neuropathy    Personal history of other diseases of the nervous system and sense organs 05/26/2016    History of neuropathy    Personal history of other diseases of the respiratory system 11/21/2022    History of chronic obstructive lung disease    Personal history of other endocrine, nutritional and metabolic disease 03/19/2019    History of vitamin D deficiency    Personal history of other endocrine, nutritional and metabolic disease 09/03/2020    History of obesity    Personal history of other endocrine, nutritional and metabolic disease 06/18/2018    History of hypoglycemia    Personal history of other infectious and parasitic diseases  11/30/2015    History of viral infection    Personal history of other specified conditions 08/26/2022    History of edema    Personal history of other specified conditions 08/05/2021    History of abdominal pain    Personal history of urinary (tract) infections 12/23/2014    History of urinary tract infection    Proteinuria, unspecified 05/07/2021    Proteinuria    Rash and other nonspecific skin eruption 09/16/2022    Skin rash    Sunburn of first degree 06/18/2018    Sunburn of first degree    Systemic lupus erythematosus, unspecified 04/28/2020    History of systemic lupus erythematosus (SLE)    Type 2 diabetes mellitus with other circulatory complications 09/17/2022    Hypertension associated with diabetes    Unspecified open wound, left lower leg, sequela 09/13/2021    Leg wound, left, sequela    Unspecified osteoarthritis, unspecified site 03/03/2022    Osteoarthritis   [2]   Past Surgical History:  Procedure Laterality Date    BACK SURGERY  03/29/2022    Back Surgery    CHOLECYSTECTOMY      CIRCUMCISION, PRIMARY      ELBOW SURGERY  12/18/2014    Elbow Surgery    PROSTATE SURGERY      SHOULDER SURGERY  12/18/2014    Shoulder Surgery    SPINE SURGERY      TONSILLECTOMY     [3]   Family History  Problem Relation Name Age of Onset    Diabetes Mother Shobha Beckham     Arthritis Father Chalino Beckham     Cancer Brother Neftaly beckham     Cancer Brother Neftaly beckham    [4]   Allergies  Allergen Reactions    Cat's Claw Shortness of breath and Wheezing     VERIFIED BY ABRAM SEGURA RN    Gabapentin Shortness of breath and Rash    Methotrexate Analogues Shortness of breath    Penicillins Shortness of breath and Other     Unknown reaction    VERIFIED BY ABRAM SEGURA RN    Aripiprazole Swelling and Other     Can't remember reaction    VERIFIED BY ABRAM SEGURA RN    Bacitracin Swelling    Bee Venom Protein (Honey Bee) Unknown    Benzoin Other     Skin peels off -VERIFIED BY ABRAM SEGURA RN    Benzoin Compound  "Other     Skin peels off     Codeine Unknown    Gbm-[id number]-Fz-Acetaminophen Unknown    Docusate Unknown    Ex-Lax Hives     Blister    Lysolecithin Unknown    Meperidine Hives     Was told he was allergic while in hospital    Meperidine (Pf) Other    Neomycin Unknown    Neomycin-Bacitracin-Polymyxin Other     \"makes it worse instead of making it better\"    Neosporin (Neomycin-Polymyx) Unknown    Other Hives     SUN     Phenolphthalein Swelling and Hives     VERIFIED BY ABRAM SEGURA RN    Phenylephrine-Acetaminophen Swelling     VERIFIED BY ABRAM SEGURA RN    Polymyxin B Sulf-Trimethoprim Other    Povidone-Iodine Other     VERIFIED BY ABRAM SEGURA RN    Pseudoephedrine-Acetaminophen Other    Senna Other    Sennosides Other    Tizanidine Unknown    Wasp Venom Unknown    Adalimumab-Ryvk Rash    Latex, Natural Rubber Hives and Rash    Leflunomide Rash    Lithium Analogues Rash and Other     Uncontrollable body jerking    VERIFIED BY ABRAM SEGURA RN     "

## 2025-07-02 NOTE — TELEPHONE ENCOUNTER
Did call and left message to give our office a call so that we could schedule something prior to Dr Hernandez leaving.

## 2025-07-11 ENCOUNTER — TELEPHONE (OUTPATIENT)
Dept: PRIMARY CARE | Facility: CLINIC | Age: 65
End: 2025-07-11
Payer: COMMERCIAL

## 2025-07-11 NOTE — TELEPHONE ENCOUNTER
Patient called, he is experiencing swelling in his lower extremities. He wants to know if something can be called in for him. Please advise.

## 2025-07-15 ENCOUNTER — OFFICE VISIT (OUTPATIENT)
Dept: PRIMARY CARE | Facility: CLINIC | Age: 65
End: 2025-07-15
Payer: COMMERCIAL

## 2025-07-15 VITALS
HEART RATE: 78 BPM | DIASTOLIC BLOOD PRESSURE: 60 MMHG | HEIGHT: 71 IN | BODY MASS INDEX: 44.1 KG/M2 | SYSTOLIC BLOOD PRESSURE: 121 MMHG | OXYGEN SATURATION: 91 % | WEIGHT: 315 LBS

## 2025-07-15 DIAGNOSIS — D50.0 IRON DEFICIENCY ANEMIA DUE TO CHRONIC BLOOD LOSS: ICD-10-CM

## 2025-07-15 DIAGNOSIS — F31.31 BIPOLAR AFFECTIVE DISORDER, CURRENTLY DEPRESSED, MILD (MULTI): ICD-10-CM

## 2025-07-15 DIAGNOSIS — M25.471 ANKLE EDEMA, BILATERAL: Primary | ICD-10-CM

## 2025-07-15 DIAGNOSIS — N40.1 BENIGN PROSTATIC HYPERPLASIA WITH POST-VOID DRIBBLING: ICD-10-CM

## 2025-07-15 DIAGNOSIS — E11.49 DIABETES MELLITUS TYPE 2 WITH NEUROLOGICAL MANIFESTATIONS (MULTI): ICD-10-CM

## 2025-07-15 DIAGNOSIS — M25.472 ANKLE EDEMA, BILATERAL: Primary | ICD-10-CM

## 2025-07-15 DIAGNOSIS — M35.01 SJOGREN'S SYNDROME WITH KERATOCONJUNCTIVITIS SICCA: ICD-10-CM

## 2025-07-15 DIAGNOSIS — N39.43 BENIGN PROSTATIC HYPERPLASIA WITH POST-VOID DRIBBLING: ICD-10-CM

## 2025-07-15 DIAGNOSIS — I10 BENIGN ESSENTIAL HYPERTENSION: ICD-10-CM

## 2025-07-15 DIAGNOSIS — R60.9 EDEMA, UNSPECIFIED TYPE: ICD-10-CM

## 2025-07-15 PROBLEM — R05.9 COUGH: Status: RESOLVED | Noted: 2025-05-16 | Resolved: 2025-07-15

## 2025-07-15 PROBLEM — M79.674 TOE PAIN, RIGHT: Status: RESOLVED | Noted: 2025-04-17 | Resolved: 2025-07-15

## 2025-07-15 PROBLEM — Z00.01 ANNUAL VISIT FOR GENERAL ADULT MEDICAL EXAMINATION WITH ABNORMAL FINDINGS: Status: RESOLVED | Noted: 2023-04-17 | Resolved: 2025-07-15

## 2025-07-15 PROBLEM — E03.9 HYPOTHYROIDISM, UNSPECIFIED: Status: RESOLVED | Noted: 2020-05-28 | Resolved: 2025-07-15

## 2025-07-15 LAB
ANION GAP SERPL CALCULATED.4IONS-SCNC: 8 MMOL/L (CALC) (ref 7–17)
BUN SERPL-MCNC: 16 MG/DL (ref 7–25)
BUN/CREAT SERPL: ABNORMAL (CALC) (ref 6–22)
CALCIUM SERPL-MCNC: 9 MG/DL (ref 8.6–10.3)
CHLORIDE SERPL-SCNC: 102 MMOL/L (ref 98–110)
CO2 SERPL-SCNC: 30 MMOL/L (ref 20–32)
CREAT SERPL-MCNC: 0.84 MG/DL (ref 0.7–1.35)
EGFRCR SERPLBLD CKD-EPI 2021: 96 ML/MIN/1.73M2
GLUCOSE SERPL-MCNC: 143 MG/DL (ref 65–99)
IRON SATN MFR SERPL: 26 % (CALC) (ref 20–48)
IRON SERPL-MCNC: 74 MCG/DL (ref 50–180)
POTASSIUM SERPL-SCNC: 4.7 MMOL/L (ref 3.5–5.3)
SODIUM SERPL-SCNC: 140 MMOL/L (ref 135–146)
TIBC SERPL-MCNC: 282 MCG/DL (CALC) (ref 250–425)

## 2025-07-15 PROCEDURE — 3074F SYST BP LT 130 MM HG: CPT | Performed by: INTERNAL MEDICINE

## 2025-07-15 PROCEDURE — 1159F MED LIST DOCD IN RCRD: CPT | Performed by: INTERNAL MEDICINE

## 2025-07-15 PROCEDURE — 1160F RVW MEDS BY RX/DR IN RCRD: CPT | Performed by: INTERNAL MEDICINE

## 2025-07-15 PROCEDURE — 1036F TOBACCO NON-USER: CPT | Performed by: INTERNAL MEDICINE

## 2025-07-15 PROCEDURE — 4010F ACE/ARB THERAPY RXD/TAKEN: CPT | Performed by: INTERNAL MEDICINE

## 2025-07-15 PROCEDURE — 99214 OFFICE O/P EST MOD 30 MIN: CPT | Performed by: INTERNAL MEDICINE

## 2025-07-15 PROCEDURE — 3008F BODY MASS INDEX DOCD: CPT | Performed by: INTERNAL MEDICINE

## 2025-07-15 PROCEDURE — 3078F DIAST BP <80 MM HG: CPT | Performed by: INTERNAL MEDICINE

## 2025-07-15 RX ORDER — FUROSEMIDE 10 MG/ML
40 INJECTION INTRAMUSCULAR; INTRAVENOUS ONCE
Status: COMPLETED | OUTPATIENT
Start: 2025-07-15 | End: 2025-07-15

## 2025-07-15 RX ORDER — METFORMIN HYDROCHLORIDE 500 MG/1
500 TABLET ORAL
Qty: 90 TABLET | Refills: 1 | Status: SHIPPED | OUTPATIENT
Start: 2025-07-15 | End: 2026-08-19

## 2025-07-15 RX ADMIN — FUROSEMIDE 40 MG: 10 INJECTION INTRAMUSCULAR; INTRAVENOUS at 10:21

## 2025-07-15 NOTE — PROGRESS NOTES
"Subjective   Patient ID: Ronald Beckham \"Nicholas\" is a 65 y.o. male who presents for Leg Swelling.    Assessment/Plan     Problem List Items Addressed This Visit       Bipolar affective disorder, currently depressed, mild (Multi)    BPH (benign prostatic hyperplasia)    Adult BMI 50.0-59.9 kg/sq m (Multi)    Sjogren's disease    Benign essential hypertension    Relevant Orders    Iron and TIBC    Basic metabolic panel    Edema    Relevant Medications    furosemide (Lasix) injection 40 mg (Completed)    Iron deficiency anemia due to chronic blood loss    Relevant Orders    Iron and TIBC    Basic metabolic panel    Ankle edema, bilateral - Primary   Patient was evaluated today, problem list was reviewed, problems and concerns addressed, Rx list reviewed and updated, lab and tests were noted and reviewed. Life style changes were discussed, always it works better if we eat plant based diet and plenty of fibres and roughage. Consume adequate amount of water and avoid alcohol, light to moderate physical activities and stress reduction are always beneficial for ongoing physical well being. Do not forget to have 6 to 7 hours of sleep regularly and avoid late night pushpa screen exposure.      HPI 65-year-old patient who autoimmune arthritis anemia anxiety depression bipolar seen by pain management given patient Narcan increased dose of the morphine since that had decreased ADL mobility associated with the swelling of the both upper lower extremity without PND orthopnea    Onset acutely duration 2 weeks progressed acutely aggravating factor morphine and decreased activity    Blood sugar 125  hemoglobin A1c 6.3 from history of diabetes    Advise refer to dentist ophthalmology podiatry X dietitian given patient metformin 500 mg a day watch for lactic acidosis    H&H is low anemia take Slow Fe vitamin C refer to GI for EGD colonoscopy    Regarding edema given Lasix 40 intramuscular without any complication monitor the CBC " BMP uric acid and magnesium and sugar    Risk-benefit side effect of all medicine-Case discussed with the patient's and patient's family    If edema is continue does not get any better get Doppler scan of the DVT rule out lower extremity and 2D echo of the heart for the right-sided heart failure  Medical History[1]  Surgical History[2]  Allergies[3]  Current Medications[4]  Family History[5]  Social History[6]  Immunization History   Administered Date(s) Administered    Flu vaccine (IIV4), preservative free *Check age/dose* 10/07/2023    Flu vaccine, trivalent, preservative free, HIGH-DOSE, age 65y+ (Fluzone) 09/09/2024    Hep A / Hep B 01/05/2024, 05/06/2024    Influenza Nasal, Unspecified 11/18/2013    Influenza, Unspecified 10/05/2012, 11/18/2013, 10/25/2016, 08/14/2017, 09/17/2018, 09/16/2019, 09/03/2020, 09/29/2022    Influenza, seasonal, injectable 10/25/2016    MMR vaccine, subcutaneous (MMR II) 05/06/2024    PPD Test 05/28/2012, 05/30/2012    Pfizer COVID-19 vaccine, 12 years and older, (30mcg/0.3mL) (Comirnaty) 10/07/2023, 05/06/2024    Pfizer COVID-19 vaccine, bivalent, age 12 years and older (30 mcg/0.3 mL) 09/19/2022    Pfizer Gray Cap SARS-CoV-2 04/06/2022    Pfizer Purple Cap SARS-CoV-2 03/10/2021, 03/31/2021, 10/15/2021, 09/19/2022    Pneumococcal conjugate vaccine, 20-valent (PREVNAR 20) 04/17/2023    Pneumococcal polysaccharide vaccine, 23-valent, age 2 years and older (PNEUMOVAX 23) 10/28/2019    RESPIRATORY SYNCYTIAL VIRUS (RSV), ELIGIBLE PREGNANT PTS, 0.5 ML (ABRYSVO) 01/05/2024    SARS-CoV-2, Unspecified 04/06/2022    Td vaccine, age 7 years and older (TENIVAC) 05/06/2024    Tdap vaccine, age 7 year and older (BOOSTRIX, ADACEL) 03/15/2018    Zoster vaccine, recombinant, adult (SHINGRIX) 05/01/2018, 08/15/2018, 05/28/2020    Zoster, Unspecified 08/15/2018       Review of Systems  Review of systems is otherwise negative unless stated above or in history of present illness.    Objective   Visit  "Vitals  /60   Pulse 78   Ht 1.791 m (5' 10.5\")   Wt (!) 173 kg (382 lb)   SpO2 91%   BMI 54.04 kg/m²   Smoking Status Former   BSA 2.93 m²     Physical Exam  Constitutional: Edema swelling obesity     General: not in acute distress.   HENT: JVD plus     Head: Normocephalic and atraumatic.      Nose: Nose normal.   Eyes:      Extraocular Movements: Extraocular movements intact.      Conjunctiva/sclera: Conjunctivae normal.   Cardiovascular: S1-S2-S3 3+ ankle edema     Rate and Rhythm: Normal rate ,  No M/R/G  Pulmonary: Expiratory rhonchi     Effort: Pulmonary effort is normal.      Breath sounds: Normal, Bilat Equal AE  Skin: Dermatitis lower extremity     General: Skin is warm.   Neurological: Lumbar radiculopathy     Mental Status: He is alert and oriented to person, place, and time.   Psychiatric:         Mood and Affect: Mood normal.         Behavior: Behavior normal.   Musculoskeletal autoimmune arthritis affecting multiple joint  FROM in all extremitirs,  Joint-no swelling or tenderness    Office Visit on 05/16/2025   Component Date Value Ref Range Status    CREATININE, RANDOM URINE 05/16/2025 219  20 - 320 mg/dL Final    ALBUMIN, URINE 05/16/2025 0.3  See Note: mg/dL Final    ALBUMIN/CREATININE RATIO, RANDOM U* 05/16/2025 1  <30 mg/g creat Final    WHITE BLOOD CELL COUNT 05/16/2025 4.0  3.8 - 10.8 Thousand/uL Final    RED BLOOD CELL COUNT 05/16/2025 3.73 (L)  4.20 - 5.80 Million/uL Final    HEMOGLOBIN 05/16/2025 12.5 (L)  13.2 - 17.1 g/dL Final    HEMATOCRIT 05/16/2025 37.6 (L)  38.5 - 50.0 % Final    MCV 05/16/2025 100.8 (H)  80.0 - 100.0 fL Final    MCH 05/16/2025 33.5 (H)  27.0 - 33.0 pg Final    MCHC 05/16/2025 33.2  32.0 - 36.0 g/dL Final    RDW 05/16/2025 11.6  11.0 - 15.0 % Final    PLATELET COUNT 05/16/2025 151  140 - 400 Thousand/uL Final    MPV 05/16/2025 11.9  7.5 - 12.5 fL Final    ABSOLUTE NEUTROPHILS 05/16/2025 1,424 (L)  1,500 - 7,800 cells/uL Final    ABSOLUTE LYMPHOCYTES 05/16/2025 " 1,816  850 - 3,900 cells/uL Final    ABSOLUTE MONOCYTES 05/16/2025 440  200 - 950 cells/uL Final    ABSOLUTE EOSINOPHILS 05/16/2025 280  15 - 500 cells/uL Final    ABSOLUTE BASOPHILS 05/16/2025 40  0 - 200 cells/uL Final    NEUTROPHILS 05/16/2025 35.6  % Final    LYMPHOCYTES 05/16/2025 45.4  % Final    MONOCYTES 05/16/2025 11.0  % Final    EOSINOPHILS 05/16/2025 7.0  % Final    BASOPHILS 05/16/2025 1.0  % Final    GLUCOSE 05/16/2025 125  65 - 139 mg/dL Final    UREA NITROGEN (BUN) 05/16/2025 14  7 - 25 mg/dL Final    CREATININE 05/16/2025 0.89  0.70 - 1.35 mg/dL Final    EGFR 05/16/2025 95  > OR = 60 mL/min/1.73m2 Final    SODIUM 05/16/2025 139  135 - 146 mmol/L Final    POTASSIUM 05/16/2025 4.6  3.5 - 5.3 mmol/L Final    CHLORIDE 05/16/2025 104  98 - 110 mmol/L Final    CARBON DIOXIDE 05/16/2025 28  20 - 32 mmol/L Final    ELECTROLYTE BALANCE 05/16/2025 7  7 - 17 mmol/L (calc) Final    CALCIUM 05/16/2025 8.5 (L)  8.6 - 10.3 mg/dL Final    PROTEIN, TOTAL 05/16/2025 6.5  6.1 - 8.1 g/dL Final    ALBUMIN 05/16/2025 3.9  3.6 - 5.1 g/dL Final    BILIRUBIN, TOTAL 05/16/2025 0.3  0.2 - 1.2 mg/dL Final    ALKALINE PHOSPHATASE 05/16/2025 66  35 - 144 U/L Final    AST 05/16/2025 18  10 - 35 U/L Final    ALT 05/16/2025 22  9 - 46 U/L Final    HEMOGLOBIN A1c 05/16/2025 6.3 (H)  <5.7 % Final    eAG (mg/dL) 05/16/2025 134  mg/dL Final    eAG (mmol/L) 05/16/2025 7.4  mmol/L Final    CHOLESTEROL, TOTAL 05/16/2025 172  <200 mg/dL Final    HDL CHOLESTEROL 05/16/2025 49  > OR = 40 mg/dL Final    TRIGLYCERIDES 05/16/2025 128  <150 mg/dL Final    LDL-CHOLESTEROL 05/16/2025 101 (H)  mg/dL (calc) Final    CHOL/HDLC RATIO 05/16/2025 3.5  <5.0 (calc) Final    NON HDL CHOLESTEROL 05/16/2025 123  <130 mg/dL (calc) Final    MAGNESIUM 05/16/2025 2.1  1.5 - 2.5 mg/dL Final    CULTURE, URINE, ROUTINE 05/16/2025 SEE NOTE   Final    URIC ACID 05/16/2025 4.1  4.0 - 8.0 mg/dL Final    TSH W/REFLEX TO FT4 05/16/2025 3.26  0.40 - 4.50 mIU/L Final     PSA, TOTAL 05/16/2025 0.04  < OR = 4.00 ng/mL Final   Office Visit on 04/17/2025   Component Date Value Ref Range Status    CREATININE, RANDOM URINE 04/17/2025 107  20 - 320 mg/dL Final    ALBUMIN, URINE 04/17/2025 <0.2  See Note: mg/dL Final    ALBUMIN/CREATININE RATIO, RANDOM U* 04/17/2025 NOTE  <30 mg/g creat Final    WHITE BLOOD CELL COUNT 04/17/2025 5.7  3.8 - 10.8 Thousand/uL Final    RED BLOOD CELL COUNT 04/17/2025 4.00 (L)  4.20 - 5.80 Million/uL Final    HEMOGLOBIN 04/17/2025 13.5  13.2 - 17.1 g/dL Final    HEMATOCRIT 04/17/2025 40.4  38.5 - 50.0 % Final    MCV 04/17/2025 101.0 (H)  80.0 - 100.0 fL Final    MCH 04/17/2025 33.8 (H)  27.0 - 33.0 pg Final    MCHC 04/17/2025 33.4  32.0 - 36.0 g/dL Final    RDW 04/17/2025 11.5  11.0 - 15.0 % Final    PLATELET COUNT 04/17/2025 192  140 - 400 Thousand/uL Final    MPV 04/17/2025 11.6  7.5 - 12.5 fL Final    ABSOLUTE NEUTROPHILS 04/17/2025 2,879  1,500 - 7,800 cells/uL Final    ABSOLUTE LYMPHOCYTES 04/17/2025 1,858  850 - 3,900 cells/uL Final    ABSOLUTE MONOCYTES 04/17/2025 616  200 - 950 cells/uL Final    ABSOLUTE EOSINOPHILS 04/17/2025 308  15 - 500 cells/uL Final    ABSOLUTE BASOPHILS 04/17/2025 40  0 - 200 cells/uL Final    NEUTROPHILS 04/17/2025 50.5  % Final    LYMPHOCYTES 04/17/2025 32.6  % Final    MONOCYTES 04/17/2025 10.8  % Final    EOSINOPHILS 04/17/2025 5.4  % Final    BASOPHILS 04/17/2025 0.7  % Final    GLUCOSE 04/17/2025 152 (H)  65 - 99 mg/dL Final    UREA NITROGEN (BUN) 04/17/2025 16  7 - 25 mg/dL Final    CREATININE 04/17/2025 0.82  0.70 - 1.35 mg/dL Final    EGFR 04/17/2025 97  > OR = 60 mL/min/1.73m2 Final    SODIUM 04/17/2025 140  135 - 146 mmol/L Final    POTASSIUM 04/17/2025 5.1  3.5 - 5.3 mmol/L Final    CHLORIDE 04/17/2025 103  98 - 110 mmol/L Final    CARBON DIOXIDE 04/17/2025 30  20 - 32 mmol/L Final    ELECTROLYTE BALANCE 04/17/2025 7  7 - 17 mmol/L (calc) Final    CALCIUM 04/17/2025 9.0  8.6 - 10.3 mg/dL Final    PROTEIN, TOTAL  04/17/2025 7.0  6.1 - 8.1 g/dL Final    ALBUMIN 04/17/2025 4.0  3.6 - 5.1 g/dL Final    BILIRUBIN, TOTAL 04/17/2025 0.5  0.2 - 1.2 mg/dL Final    ALKALINE PHOSPHATASE 04/17/2025 75  35 - 144 U/L Final    AST 04/17/2025 20  10 - 35 U/L Final    ALT 04/17/2025 22  9 - 46 U/L Final    HEMOGLOBIN A1c 04/17/2025 6.1 (H)  <5.7 % Final    eAG (mg/dL) 04/17/2025 128  mg/dL Final    eAG (mmol/L) 04/17/2025 7.1  mmol/L Final    URIC ACID 04/17/2025 3.7 (L)  4.0 - 8.0 mg/dL Final       Radiology: Reviewed imaging in powerchart.  Imaging  No results found.    Cardiology, Vascular, and Other Imaging  No other imaging results found for the past 7 days        Charting was completed using voice recognition technology and may include unintended errors.            [1]   Past Medical History:  Diagnosis Date    Allergic     Anemia     Anxiety     Arthritis     Calculus of gallbladder without cholecystitis without obstruction 04/04/2022    Gallstones    Cataract     Cellulitis of abdominal wall 09/01/2021    Cellulitis of right abdominal wall    Cellulitis of left external ear 06/13/2022    Cellulitis of left earlobe    Cellulitis of right lower limb 09/09/2020    Cellulitis of right lower extremity    Deficiency of other specified B group vitamins 03/19/2019    B12 deficiency    Depression     Disease of thyroid gland     Dorsalgia, unspecified 09/17/2022    Chronic back pain greater than 3 months duration    Eczema     Encounter for screening for malignant neoplasm of colon 02/04/2021    Screen for colon cancer    Encounter for screening for malignant neoplasm of rectum 02/04/2021    Screening for rectal cancer    Erythema intertrigo 05/09/2021    Intertrigo    GERD (gastroesophageal reflux disease)     Heart murmur     Hypertension     Impaired fasting glucose 09/17/2018    Impaired fasting glucose    Localized edema 08/26/2022    Lower extremity edema    Lumbago with sciatica, right side 11/01/2018    Lumbago with sciatica, right  side    Morbid (severe) obesity due to excess calories (Multi) 11/21/2022    Morbid obesity with BMI of 40.0-44.9, adult    Morbid (severe) obesity due to excess calories (Multi) 07/11/2022    Morbid obesity with BMI of 45.0-49.9, adult    Neuromuscular disorder (Multi)     Other bursitis of knee, right knee 03/01/2021    Bursitis of right knee    Other conditions influencing health status 11/21/2022    Diabetes type 2, uncontrolled    Other obesity 03/03/2022    Moderate obesity    Other obesity 11/04/2021    Moderate obesity    Pain in right elbow 08/26/2022    Right elbow pain    Pain in right foot 08/16/2017    Inflammatory pain of right heel    Pain in right hip 07/12/2018    Right hip pain    Pain in right knee 06/15/2021    Acute pain of right knee    Pain in right knee 04/05/2021    Right knee pain    Personal history of diseases of the blood and blood-forming organs and certain disorders involving the immune mechanism 03/19/2019    History of anemia    Personal history of diseases of the skin and subcutaneous tissue 05/09/2021    History of contact dermatitis    Personal history of diseases of the skin and subcutaneous tissue 06/13/2022    History of eczema    Personal history of other diseases of the digestive system 04/27/2022    History of constipation    Personal history of other diseases of the musculoskeletal system and connective tissue 09/16/2019    History of polymyalgia rheumatica    Personal history of other diseases of the musculoskeletal system and connective tissue 03/19/2019    History of arthritis    Personal history of other diseases of the musculoskeletal system and connective tissue 03/01/2021    History of acute gouty arthritis    Personal history of other diseases of the nervous system and sense organs 10/28/2019    History of neuropathy    Personal history of other diseases of the nervous system and sense organs 05/26/2016    History of neuropathy    Personal history of other diseases  of the respiratory system 11/21/2022    History of chronic obstructive lung disease    Personal history of other endocrine, nutritional and metabolic disease 03/19/2019    History of vitamin D deficiency    Personal history of other endocrine, nutritional and metabolic disease 09/03/2020    History of obesity    Personal history of other endocrine, nutritional and metabolic disease 06/18/2018    History of hypoglycemia    Personal history of other infectious and parasitic diseases 11/30/2015    History of viral infection    Personal history of other specified conditions 08/26/2022    History of edema    Personal history of other specified conditions 08/05/2021    History of abdominal pain    Personal history of urinary (tract) infections 12/23/2014    History of urinary tract infection    Proteinuria, unspecified 05/07/2021    Proteinuria    Rash and other nonspecific skin eruption 09/16/2022    Skin rash    Sunburn of first degree 06/18/2018    Sunburn of first degree    Systemic lupus erythematosus, unspecified 04/28/2020    History of systemic lupus erythematosus (SLE)    Type 2 diabetes mellitus with other circulatory complications 09/17/2022    Hypertension associated with diabetes    Unspecified open wound, left lower leg, sequela 09/13/2021    Leg wound, left, sequela    Unspecified osteoarthritis, unspecified site 03/03/2022    Osteoarthritis   [2]   Past Surgical History:  Procedure Laterality Date    BACK SURGERY  03/29/2022    Back Surgery    CHOLECYSTECTOMY      CIRCUMCISION, PRIMARY      ELBOW SURGERY  12/18/2014    Elbow Surgery    PROSTATE SURGERY      SHOULDER SURGERY  12/18/2014    Shoulder Surgery    SPINE SURGERY      TONSILLECTOMY     [3]   Allergies  Allergen Reactions    Cat's Claw Shortness of breath and Wheezing     VERIFIED BY ABRAM SEGURA RN    Gabapentin Shortness of breath and Rash    Methotrexate Analogues Shortness of breath    Penicillins Shortness of breath and Other     Unknown  "reaction    VERIFIED BY ABRAM SEGURA RN    Aripiprazole Swelling and Other     Can't remember reaction    VERIFIED BY ABRAM SEGURA RN    Bacitracin Swelling    Bee Venom Protein (Honey Bee) Unknown    Benzoin Other     Skin peels off -VERIFIED BY ABRAM SEGURA RN    Benzoin Compound Other     Skin peels off     Codeine Unknown    Rpl-[id number]-[id number]-Acetaminophen Unknown    Docusate Unknown    Ex-Lax Hives     Blister    Lysolecithin Unknown    Meperidine Hives     Was told he was allergic while in hospital    Meperidine (Pf) Other    Neomycin Unknown    Neomycin-Bacitracin-Polymyxin Other     \"makes it worse instead of making it better\"    Neosporin (Neomycin-Polymyx) Unknown    Other Hives     SUN     Phenolphthalein Swelling and Hives     VERIFIED BY ABRAM SEGURA RN    Phenylephrine-Acetaminophen Swelling     VERIFIED BY ABRAM SEGURA RN    Polymyxin B Sulf-Trimethoprim Other    Povidone-Iodine Other     VERIFIED BY ABRAM SEGURA RN    Pseudoephedrine-Acetaminophen Other    Senna Other    Sennosides Other    Tizanidine Unknown    Wasp Venom Unknown    Adalimumab-Ryvk Rash    Latex, Natural Rubber Hives and Rash    Leflunomide Rash    Lithium Analogues Rash and Other     Uncontrollable body jerking    VERIFIED BY ABRAM SEGURA RN   [4]   Current Outpatient Medications   Medication Sig Dispense Refill    DULoxetine (Cymbalta) 60 mg DR capsule Take 1 capsule (60 mg) by mouth 2 times a day. Do not crush or chew.      esomeprazole (NexIUM) 40 mg DR capsule TAKE 1 CAPSULE BY MOUTH EVERY DAY 90 capsule 3    ferrous sulfate 325 (65 Fe) mg EC tablet Take 1 tablet by mouth 3 times daily (morning, midday, late afternoon). Do not crush, chew, or split.      hydroxychloroquine (Plaquenil) 200 mg tablet TAKE 1 TABLET BY MOUTH TWICE A  tablet 1    lamoTRIgine (LaMICtal) 200 mg tablet TAKE 1 TABLET BY MOUTH AT BEDTIME DAILY      levothyroxine (Synthroid, Levoxyl) 200 mcg tablet TAKE 1 TAB BY MOUTH ON SATURDAY & SUNDAY & Monday " TAKE ON AN EMPTY STOMACH AT THE SAME TIME EACH DAY, EITHER 30 TO 60 MINUTES PRIOR TO BREAKFAST 90 tablet 3    meloxicam (Mobic) 15 mg tablet Take 1 tablet (15 mg) by mouth once daily.      mycophenolate (Cellcept) 250 mg capsule TAKE 1 CAPSULE (250 MG) BY MOUTH 2 TIMES A DAY. 180 capsule 1    naloxone (Narcan) 4 mg/0.1 mL nasal spray 1 puff when necessary for signs of overdose      NON FORMULARY Morphine Sulfate      olmesartan (BENIcar) 40 mg tablet Take 1 tablet (40 mg) by mouth once daily. 90 tablet 3    oxybutynin XL (Ditropan-XL) 15 mg 24 hr tablet Take 1 tablet (15 mg) by mouth early in the morning..      pilocarpine (Salagen) 5 mg tablet Take 1 tablet (5 mg) by mouth 3 times a day. 90 tablet 2    predniSONE (Deltasone) 5 mg tablet Take 1 tablet (5 mg) by mouth 2 times a day. 10 tablet 0    semaglutide 0.25 mg or 0.5 mg (2 mg/3 mL) pen injector Inject 0.25 mg under the skin 1 (one) time per week. 3 mL 0    tamsulosin (Flomax) 0.4 mg 24 hr capsule Take by mouth.       No current facility-administered medications for this visit.   [5]   Family History  Problem Relation Name Age of Onset    Diabetes Mother Shobha Greenbergralph     Arthritis Father Chalino Pau     Cancer Brother Neftaly pau     Cancer Brother Neftaly pau    [6]   Social History  Socioeconomic History    Marital status:    Tobacco Use    Smoking status: Former     Current packs/day: 1.50     Average packs/day: 1.5 packs/day for 40.0 years (60.0 ttl pk-yrs)     Types: Cigarettes    Smokeless tobacco: Never   Substance and Sexual Activity    Alcohol use: Never    Drug use: Never    Sexual activity: Not Currently     Partners: Female     Birth control/protection: Condom Male

## 2025-07-16 ENCOUNTER — RESULTS FOLLOW-UP (OUTPATIENT)
Dept: PRIMARY CARE | Facility: CLINIC | Age: 65
End: 2025-07-16
Payer: COMMERCIAL

## 2025-07-17 NOTE — TELEPHONE ENCOUNTER
----- Message from Babak Jeronimo sent at 7/16/2025  9:57 AM EDT -----  Glucose 143 low-carb diet follow-up  ----- Message -----  From: [facility] Results In  Sent: 7/15/2025   9:26 PM EDT  To: Babak Jeronimo MD

## 2025-08-07 ASSESSMENT — ENCOUNTER SYMPTOMS
POLYDIPSIA: 1
VISUAL CHANGE: 0
TREMORS: 0
SPEECH DIFFICULTY: 0
WEAKNESS: 1
POLYPHAGIA: 0
FATIGUE: 1
WEIGHT LOSS: 0
SEIZURES: 0
BLACKOUTS: 0
DIZZINESS: 0
BLURRED VISION: 0
SWEATS: 1
CONFUSION: 1
NERVOUS/ANXIOUS: 1
HUNGER: 0
HEADACHES: 0

## 2025-08-11 ENCOUNTER — TELEPHONE (OUTPATIENT)
Dept: PAIN MEDICINE | Facility: CLINIC | Age: 65
End: 2025-08-11

## 2025-08-11 ENCOUNTER — APPOINTMENT (OUTPATIENT)
Dept: PRIMARY CARE | Facility: CLINIC | Age: 65
End: 2025-08-11
Payer: COMMERCIAL

## 2025-08-11 VITALS
HEART RATE: 84 BPM | BODY MASS INDEX: 44.1 KG/M2 | TEMPERATURE: 97 F | OXYGEN SATURATION: 95 % | HEIGHT: 71 IN | WEIGHT: 315 LBS | SYSTOLIC BLOOD PRESSURE: 142 MMHG | DIASTOLIC BLOOD PRESSURE: 81 MMHG

## 2025-08-11 DIAGNOSIS — F11.20: ICD-10-CM

## 2025-08-11 DIAGNOSIS — R22.0 SWELLING OF BOTH LIPS: Primary | ICD-10-CM

## 2025-08-11 DIAGNOSIS — Z00.00 HEALTH CARE MAINTENANCE: ICD-10-CM

## 2025-08-11 DIAGNOSIS — F31.31 BIPOLAR AFFECTIVE DISORDER, CURRENTLY DEPRESSED, MILD (MULTI): ICD-10-CM

## 2025-08-11 DIAGNOSIS — R73.09 HIGH GLUCOSE LEVEL: ICD-10-CM

## 2025-08-11 DIAGNOSIS — I10 BENIGN ESSENTIAL HYPERTENSION: ICD-10-CM

## 2025-08-11 PROCEDURE — 1159F MED LIST DOCD IN RCRD: CPT | Performed by: INTERNAL MEDICINE

## 2025-08-11 PROCEDURE — 3008F BODY MASS INDEX DOCD: CPT | Performed by: INTERNAL MEDICINE

## 2025-08-11 PROCEDURE — 1160F RVW MEDS BY RX/DR IN RCRD: CPT | Performed by: INTERNAL MEDICINE

## 2025-08-11 PROCEDURE — 99214 OFFICE O/P EST MOD 30 MIN: CPT | Performed by: INTERNAL MEDICINE

## 2025-08-11 PROCEDURE — 3079F DIAST BP 80-89 MM HG: CPT | Performed by: INTERNAL MEDICINE

## 2025-08-11 PROCEDURE — 3077F SYST BP >= 140 MM HG: CPT | Performed by: INTERNAL MEDICINE

## 2025-08-11 RX ORDER — DIPHENHYDRAMINE HCL 25 MG
25 TABLET ORAL 3 TIMES DAILY
Qty: 9 TABLET | Refills: 0 | Status: SHIPPED | OUTPATIENT
Start: 2025-08-11

## 2025-08-11 RX ORDER — AMLODIPINE BESYLATE 5 MG/1
5 TABLET ORAL DAILY
Qty: 90 TABLET | Refills: 3 | Status: SHIPPED | OUTPATIENT
Start: 2025-08-11 | End: 2026-08-11

## 2025-08-12 ENCOUNTER — TELEPHONE (OUTPATIENT)
Dept: PAIN MEDICINE | Facility: CLINIC | Age: 65
End: 2025-08-12
Payer: COMMERCIAL

## 2025-08-13 ENCOUNTER — HOSPITAL ENCOUNTER (OUTPATIENT)
Dept: PAIN MEDICINE | Facility: CLINIC | Age: 65
Discharge: HOME | End: 2025-08-13
Payer: MEDICARE

## 2025-08-13 VITALS
SYSTOLIC BLOOD PRESSURE: 175 MMHG | TEMPERATURE: 96.4 F | RESPIRATION RATE: 18 BRPM | OXYGEN SATURATION: 94 % | DIASTOLIC BLOOD PRESSURE: 109 MMHG | HEART RATE: 100 BPM

## 2025-08-13 DIAGNOSIS — M96.1 POSTLAMINECTOMY SYNDROME: ICD-10-CM

## 2025-08-13 PROCEDURE — 62370 ANL SP INF PMP W/MDREPRG&FIL: CPT | Performed by: PAIN MEDICINE

## 2025-08-13 PROCEDURE — 7100000010 HC PHASE TWO TIME - EACH INCREMENTAL 1 MINUTE

## 2025-08-13 PROCEDURE — 7100000009 HC PHASE TWO TIME - INITIAL BASE CHARGE

## 2025-08-13 ASSESSMENT — PAIN - FUNCTIONAL ASSESSMENT: PAIN_FUNCTIONAL_ASSESSMENT: 0-10

## 2025-08-13 ASSESSMENT — PAIN SCALES - GENERAL: PAINLEVEL_OUTOF10: 8

## 2025-08-18 ENCOUNTER — APPOINTMENT (OUTPATIENT)
Dept: PAIN MEDICINE | Facility: CLINIC | Age: 65
End: 2025-08-18
Payer: COMMERCIAL

## 2025-09-02 ENCOUNTER — OFFICE VISIT (OUTPATIENT)
Dept: PRIMARY CARE | Facility: CLINIC | Age: 65
End: 2025-09-02
Payer: COMMERCIAL

## 2025-09-02 VITALS
HEIGHT: 71 IN | DIASTOLIC BLOOD PRESSURE: 80 MMHG | OXYGEN SATURATION: 96 % | HEART RATE: 85 BPM | WEIGHT: 315 LBS | BODY MASS INDEX: 44.1 KG/M2 | TEMPERATURE: 97.2 F | SYSTOLIC BLOOD PRESSURE: 155 MMHG

## 2025-09-02 DIAGNOSIS — M25.472 ANKLE EDEMA, BILATERAL: Primary | ICD-10-CM

## 2025-09-02 DIAGNOSIS — M35.03 SJOGREN'S SYNDROME WITH MYOPATHY (MULTI): ICD-10-CM

## 2025-09-02 DIAGNOSIS — R60.0 LOCALIZED EDEMA: ICD-10-CM

## 2025-09-02 DIAGNOSIS — Z23 NEED FOR INFLUENZA VACCINATION: ICD-10-CM

## 2025-09-02 DIAGNOSIS — I10 BENIGN ESSENTIAL HYPERTENSION: ICD-10-CM

## 2025-09-02 DIAGNOSIS — F31.31 BIPOLAR AFFECTIVE DISORDER, CURRENTLY DEPRESSED, MILD (MULTI): ICD-10-CM

## 2025-09-02 DIAGNOSIS — M25.471 ANKLE EDEMA, BILATERAL: Primary | ICD-10-CM

## 2025-09-02 PROCEDURE — 1160F RVW MEDS BY RX/DR IN RCRD: CPT | Performed by: INTERNAL MEDICINE

## 2025-09-02 PROCEDURE — 1159F MED LIST DOCD IN RCRD: CPT | Performed by: INTERNAL MEDICINE

## 2025-09-02 PROCEDURE — 96372 THER/PROPH/DIAG INJ SC/IM: CPT | Performed by: INTERNAL MEDICINE

## 2025-09-02 PROCEDURE — 3008F BODY MASS INDEX DOCD: CPT | Performed by: INTERNAL MEDICINE

## 2025-09-02 PROCEDURE — 99214 OFFICE O/P EST MOD 30 MIN: CPT | Performed by: INTERNAL MEDICINE

## 2025-09-02 PROCEDURE — 3079F DIAST BP 80-89 MM HG: CPT | Performed by: INTERNAL MEDICINE

## 2025-09-02 PROCEDURE — 1036F TOBACCO NON-USER: CPT | Performed by: INTERNAL MEDICINE

## 2025-09-02 PROCEDURE — 90662 IIV NO PRSV INCREASED AG IM: CPT | Performed by: INTERNAL MEDICINE

## 2025-09-02 PROCEDURE — G0008 ADMIN INFLUENZA VIRUS VAC: HCPCS | Performed by: INTERNAL MEDICINE

## 2025-09-02 PROCEDURE — 3077F SYST BP >= 140 MM HG: CPT | Performed by: INTERNAL MEDICINE

## 2025-09-02 RX ORDER — FUROSEMIDE 10 MG/ML
40 INJECTION INTRAMUSCULAR; INTRAVENOUS ONCE
Status: COMPLETED | OUTPATIENT
Start: 2025-09-02 | End: 2025-09-02

## 2025-09-02 RX ORDER — TRIAMTERENE AND HYDROCHLOROTHIAZIDE 75; 50 MG/1; MG/1
1 TABLET ORAL DAILY
Qty: 30 TABLET | Refills: 2 | Status: SHIPPED | OUTPATIENT
Start: 2025-09-02

## 2025-09-02 RX ORDER — POTASSIUM CHLORIDE 750 MG/1
10 TABLET, FILM COATED, EXTENDED RELEASE ORAL DAILY
Qty: 30 TABLET | Refills: 2 | Status: SHIPPED | OUTPATIENT
Start: 2025-09-02

## 2025-09-02 RX ADMIN — FUROSEMIDE 40 MG: 10 INJECTION INTRAMUSCULAR; INTRAVENOUS at 11:56

## 2025-09-02 ASSESSMENT — PATIENT HEALTH QUESTIONNAIRE - PHQ9
SUM OF ALL RESPONSES TO PHQ9 QUESTIONS 1 AND 2: 0
2. FEELING DOWN, DEPRESSED OR HOPELESS: NOT AT ALL
1. LITTLE INTEREST OR PLEASURE IN DOING THINGS: NOT AT ALL

## 2025-09-22 ENCOUNTER — APPOINTMENT (OUTPATIENT)
Dept: PRIMARY CARE | Facility: CLINIC | Age: 65
End: 2025-09-22
Payer: COMMERCIAL

## 2025-10-17 ENCOUNTER — APPOINTMENT (OUTPATIENT)
Dept: PAIN MEDICINE | Facility: CLINIC | Age: 65
End: 2025-10-17
Payer: COMMERCIAL